# Patient Record
Sex: MALE | URBAN - METROPOLITAN AREA
[De-identification: names, ages, dates, MRNs, and addresses within clinical notes are randomized per-mention and may not be internally consistent; named-entity substitution may affect disease eponyms.]

---

## 2019-01-01 ENCOUNTER — APPOINTMENT (OUTPATIENT)
Dept: RADIOLOGY | Facility: MEDICAL CENTER | Age: 64
DRG: 082 | End: 2019-01-01
Attending: NEUROLOGICAL SURGERY
Payer: COMMERCIAL

## 2019-01-01 ENCOUNTER — APPOINTMENT (OUTPATIENT)
Dept: RADIOLOGY | Facility: MEDICAL CENTER | Age: 64
DRG: 082 | End: 2019-01-01
Attending: NURSE PRACTITIONER
Payer: COMMERCIAL

## 2019-01-01 ENCOUNTER — APPOINTMENT (OUTPATIENT)
Dept: RADIOLOGY | Facility: MEDICAL CENTER | Age: 64
DRG: 082 | End: 2019-01-01
Attending: EMERGENCY MEDICINE
Payer: COMMERCIAL

## 2019-01-01 ENCOUNTER — APPOINTMENT (OUTPATIENT)
Dept: RADIOLOGY | Facility: MEDICAL CENTER | Age: 64
DRG: 082 | End: 2019-01-01
Attending: SURGERY
Payer: COMMERCIAL

## 2019-01-01 ENCOUNTER — HOSPITAL ENCOUNTER (INPATIENT)
Facility: MEDICAL CENTER | Age: 64
LOS: 12 days | DRG: 082 | End: 2019-04-22
Attending: EMERGENCY MEDICINE | Admitting: SURGERY
Payer: COMMERCIAL

## 2019-01-01 ENCOUNTER — APPOINTMENT (OUTPATIENT)
Dept: RADIOLOGY | Facility: MEDICAL CENTER | Age: 64
DRG: 082 | End: 2019-01-01
Attending: PEDIATRICS
Payer: COMMERCIAL

## 2019-01-01 ENCOUNTER — APPOINTMENT (OUTPATIENT)
Dept: RADIOLOGY | Facility: MEDICAL CENTER | Age: 64
DRG: 082 | End: 2019-01-01
Attending: PHYSICIAN ASSISTANT
Payer: COMMERCIAL

## 2019-01-01 VITALS
WEIGHT: 164.46 LBS | TEMPERATURE: 98.4 F | BODY MASS INDEX: 23.02 KG/M2 | DIASTOLIC BLOOD PRESSURE: 81 MMHG | SYSTOLIC BLOOD PRESSURE: 122 MMHG | HEIGHT: 71 IN

## 2019-01-01 LAB
ABO GROUP BLD: NORMAL
ABO GROUP BLD: NORMAL
ACTION RANGE TRIGGERED IACRT: NO
ACTION RANGE TRIGGERED IACRT: NO
ALBUMIN SERPL BCP-MCNC: 2.5 G/DL (ref 3.2–4.9)
ALBUMIN SERPL BCP-MCNC: 2.5 G/DL (ref 3.2–4.9)
ALBUMIN SERPL BCP-MCNC: 2.8 G/DL (ref 3.2–4.9)
ALBUMIN SERPL BCP-MCNC: 2.9 G/DL (ref 3.2–4.9)
ALBUMIN SERPL BCP-MCNC: 3 G/DL (ref 3.2–4.9)
ALBUMIN SERPL BCP-MCNC: 3.1 G/DL (ref 3.2–4.9)
ALBUMIN SERPL BCP-MCNC: 3.3 G/DL (ref 3.2–4.9)
ALBUMIN/GLOB SERPL: 0.8 G/DL
ALP SERPL-CCNC: 106 U/L (ref 30–99)
ALP SERPL-CCNC: 113 U/L (ref 30–99)
ALP SERPL-CCNC: 116 U/L (ref 30–99)
ALP SERPL-CCNC: 150 U/L (ref 30–99)
ALP SERPL-CCNC: 161 U/L (ref 30–99)
ALP SERPL-CCNC: 164 U/L (ref 30–99)
ALP SERPL-CCNC: 193 U/L (ref 30–99)
ALT SERPL-CCNC: 6 U/L (ref 2–50)
ALT SERPL-CCNC: 7 U/L (ref 2–50)
ALT SERPL-CCNC: <5 U/L (ref 2–50)
AMMONIA PLAS-SCNC: 37 UMOL/L (ref 11–45)
AMORPH CRY #/AREA URNS HPF: PRESENT /HPF
ANION GAP SERPL CALC-SCNC: 10 MMOL/L (ref 0–11.9)
ANION GAP SERPL CALC-SCNC: 10 MMOL/L (ref 0–11.9)
ANION GAP SERPL CALC-SCNC: 14 MMOL/L (ref 0–11.9)
ANION GAP SERPL CALC-SCNC: 15 MMOL/L (ref 0–11.9)
ANION GAP SERPL CALC-SCNC: 15 MMOL/L (ref 0–11.9)
ANION GAP SERPL CALC-SCNC: 17 MMOL/L (ref 0–11.9)
ANION GAP SERPL CALC-SCNC: 7 MMOL/L (ref 0–11.9)
ANION GAP SERPL CALC-SCNC: 7 MMOL/L (ref 0–11.9)
ANION GAP SERPL CALC-SCNC: 8 MMOL/L (ref 0–11.9)
ANION GAP SERPL CALC-SCNC: 9 MMOL/L (ref 0–11.9)
ANISOCYTOSIS BLD QL SMEAR: ABNORMAL
APPEARANCE UR: ABNORMAL
AST SERPL-CCNC: 10 U/L (ref 12–45)
AST SERPL-CCNC: 11 U/L (ref 12–45)
AST SERPL-CCNC: 12 U/L (ref 12–45)
AST SERPL-CCNC: 15 U/L (ref 12–45)
AST SERPL-CCNC: 15 U/L (ref 12–45)
AST SERPL-CCNC: 16 U/L (ref 12–45)
AST SERPL-CCNC: 22 U/L (ref 12–45)
BACTERIA #/AREA URNS HPF: ABNORMAL /HPF
BACTERIA BLD CULT: NORMAL
BACTERIA SPEC RESP CULT: ABNORMAL
BACTERIA SPEC RESP CULT: ABNORMAL
BASE EXCESS BLDA CALC-SCNC: 1 MMOL/L (ref -4–3)
BASE EXCESS BLDA CALC-SCNC: 5 MMOL/L (ref -4–3)
BASE EXCESS BLDA CALC-SCNC: 7 MMOL/L (ref -4–3)
BASOPHILS # BLD AUTO: 0.1 % (ref 0–1.8)
BASOPHILS # BLD AUTO: 0.1 % (ref 0–1.8)
BASOPHILS # BLD AUTO: 0.2 % (ref 0–1.8)
BASOPHILS # BLD AUTO: 0.3 % (ref 0–1.8)
BASOPHILS # BLD AUTO: 0.3 % (ref 0–1.8)
BASOPHILS # BLD AUTO: 0.5 % (ref 0–1.8)
BASOPHILS # BLD AUTO: 0.6 % (ref 0–1.8)
BASOPHILS # BLD AUTO: 0.7 % (ref 0–1.8)
BASOPHILS # BLD AUTO: 0.7 % (ref 0–1.8)
BASOPHILS # BLD: 0.01 K/UL (ref 0–0.12)
BASOPHILS # BLD: 0.02 K/UL (ref 0–0.12)
BASOPHILS # BLD: 0.03 K/UL (ref 0–0.12)
BASOPHILS # BLD: 0.04 K/UL (ref 0–0.12)
BASOPHILS # BLD: 0.07 K/UL (ref 0–0.12)
BASOPHILS # BLD: 0.07 K/UL (ref 0–0.12)
BASOPHILS # BLD: 0.08 K/UL (ref 0–0.12)
BASOPHILS # BLD: 0.09 K/UL (ref 0–0.12)
BASOPHILS # BLD: 0.09 K/UL (ref 0–0.12)
BASOPHILS # BLD: 0.1 K/UL (ref 0–0.12)
BILIRUB SERPL-MCNC: 1.4 MG/DL (ref 0.1–1.5)
BILIRUB SERPL-MCNC: 1.6 MG/DL (ref 0.1–1.5)
BILIRUB SERPL-MCNC: 1.6 MG/DL (ref 0.1–1.5)
BILIRUB SERPL-MCNC: 1.7 MG/DL (ref 0.1–1.5)
BILIRUB SERPL-MCNC: 1.8 MG/DL (ref 0.1–1.5)
BILIRUB SERPL-MCNC: 1.8 MG/DL (ref 0.1–1.5)
BILIRUB SERPL-MCNC: 1.9 MG/DL (ref 0.1–1.5)
BILIRUB UR QL STRIP.AUTO: ABNORMAL
BLD GP AB SCN SERPL QL: NORMAL
BNP SERPL-MCNC: >5000 PG/ML (ref 0–100)
BODY TEMPERATURE: ABNORMAL CENTIGRADE
BODY TEMPERATURE: ABNORMAL DEGREES
BODY TEMPERATURE: ABNORMAL DEGREES
BUN SERPL-MCNC: 100 MG/DL (ref 8–22)
BUN SERPL-MCNC: 112 MG/DL (ref 8–22)
BUN SERPL-MCNC: 17 MG/DL (ref 8–22)
BUN SERPL-MCNC: 18 MG/DL (ref 8–22)
BUN SERPL-MCNC: 20 MG/DL (ref 8–22)
BUN SERPL-MCNC: 26 MG/DL (ref 8–22)
BUN SERPL-MCNC: 30 MG/DL (ref 8–22)
BUN SERPL-MCNC: 49 MG/DL (ref 8–22)
BUN SERPL-MCNC: 53 MG/DL (ref 8–22)
BUN SERPL-MCNC: 71 MG/DL (ref 8–22)
BUN SERPL-MCNC: 75 MG/DL (ref 8–22)
BUN SERPL-MCNC: 78 MG/DL (ref 8–22)
BUN SERPL-MCNC: 85 MG/DL (ref 8–22)
BUN SERPL-MCNC: 94 MG/DL (ref 8–22)
BURR CELLS BLD QL SMEAR: NORMAL
CALCIUM SERPL-MCNC: 8 MG/DL (ref 8.5–10.5)
CALCIUM SERPL-MCNC: 8.4 MG/DL (ref 8.5–10.5)
CALCIUM SERPL-MCNC: 8.5 MG/DL (ref 8.5–10.5)
CALCIUM SERPL-MCNC: 8.5 MG/DL (ref 8.5–10.5)
CALCIUM SERPL-MCNC: 8.6 MG/DL (ref 8.5–10.5)
CALCIUM SERPL-MCNC: 8.7 MG/DL (ref 8.5–10.5)
CALCIUM SERPL-MCNC: 8.8 MG/DL (ref 8.5–10.5)
CALCIUM SERPL-MCNC: 8.8 MG/DL (ref 8.5–10.5)
CALCIUM SERPL-MCNC: 9 MG/DL (ref 8.5–10.5)
CALCIUM SERPL-MCNC: 9.1 MG/DL (ref 8.5–10.5)
CALCIUM SERPL-MCNC: 9.3 MG/DL (ref 8.5–10.5)
CFT BLD TEG: 3.9 MIN (ref 5–10)
CHLORIDE SERPL-SCNC: 103 MMOL/L (ref 96–112)
CHLORIDE SERPL-SCNC: 103 MMOL/L (ref 96–112)
CHLORIDE SERPL-SCNC: 104 MMOL/L (ref 96–112)
CHLORIDE SERPL-SCNC: 105 MMOL/L (ref 96–112)
CHLORIDE SERPL-SCNC: 106 MMOL/L (ref 96–112)
CHLORIDE SERPL-SCNC: 107 MMOL/L (ref 96–112)
CHLORIDE SERPL-SCNC: 107 MMOL/L (ref 96–112)
CHLORIDE SERPL-SCNC: 112 MMOL/L (ref 96–112)
CHLORIDE SERPL-SCNC: 93 MMOL/L (ref 96–112)
CHLORIDE SERPL-SCNC: 94 MMOL/L (ref 96–112)
CHLORIDE SERPL-SCNC: 98 MMOL/L (ref 96–112)
CHLORIDE SERPL-SCNC: 98 MMOL/L (ref 96–112)
CLOT ANGLE BLD TEG: 68.9 DEGREES (ref 53–72)
CLOT LYSIS 30M P MA LENFR BLD TEG: 0.3 % (ref 0–8)
CO2 BLDA-SCNC: 29 MMOL/L (ref 20–33)
CO2 BLDA-SCNC: 30 MMOL/L (ref 20–33)
CO2 SERPL-SCNC: 22 MMOL/L (ref 20–33)
CO2 SERPL-SCNC: 24 MMOL/L (ref 20–33)
CO2 SERPL-SCNC: 25 MMOL/L (ref 20–33)
CO2 SERPL-SCNC: 27 MMOL/L (ref 20–33)
CO2 SERPL-SCNC: 28 MMOL/L (ref 20–33)
CO2 SERPL-SCNC: 29 MMOL/L (ref 20–33)
CO2 SERPL-SCNC: 29 MMOL/L (ref 20–33)
CO2 SERPL-SCNC: 31 MMOL/L (ref 20–33)
COLOR UR: YELLOW
COMMENT 1642: NORMAL
CREAT SERPL-MCNC: 2.45 MG/DL (ref 0.5–1.4)
CREAT SERPL-MCNC: 2.51 MG/DL (ref 0.5–1.4)
CREAT SERPL-MCNC: 2.52 MG/DL (ref 0.5–1.4)
CREAT SERPL-MCNC: 2.52 MG/DL (ref 0.5–1.4)
CREAT SERPL-MCNC: 2.76 MG/DL (ref 0.5–1.4)
CREAT SERPL-MCNC: 2.83 MG/DL (ref 0.5–1.4)
CREAT SERPL-MCNC: 2.85 MG/DL (ref 0.5–1.4)
CREAT SERPL-MCNC: 2.92 MG/DL (ref 0.5–1.4)
CREAT SERPL-MCNC: 2.95 MG/DL (ref 0.5–1.4)
CREAT SERPL-MCNC: 2.96 MG/DL (ref 0.5–1.4)
CREAT SERPL-MCNC: 3.01 MG/DL (ref 0.5–1.4)
CREAT SERPL-MCNC: 3.15 MG/DL (ref 0.5–1.4)
CREAT SERPL-MCNC: 3.17 MG/DL (ref 0.5–1.4)
CREAT SERPL-MCNC: 3.59 MG/DL (ref 0.5–1.4)
CRP SERPL HS-MCNC: 14.17 MG/DL (ref 0–0.75)
CT.EXTRINSIC BLD ROTEM: 1.5 MIN (ref 1–3)
EKG IMPRESSION: NORMAL
EOSINOPHIL # BLD AUTO: 0 K/UL (ref 0–0.51)
EOSINOPHIL # BLD AUTO: 0 K/UL (ref 0–0.51)
EOSINOPHIL # BLD AUTO: 0.02 K/UL (ref 0–0.51)
EOSINOPHIL # BLD AUTO: 0.07 K/UL (ref 0–0.51)
EOSINOPHIL # BLD AUTO: 0.07 K/UL (ref 0–0.51)
EOSINOPHIL # BLD AUTO: 0.15 K/UL (ref 0–0.51)
EOSINOPHIL # BLD AUTO: 0.17 K/UL (ref 0–0.51)
EOSINOPHIL # BLD AUTO: 0.21 K/UL (ref 0–0.51)
EOSINOPHIL # BLD AUTO: 0.26 K/UL (ref 0–0.51)
EOSINOPHIL # BLD AUTO: 0.27 K/UL (ref 0–0.51)
EOSINOPHIL NFR BLD: 0 % (ref 0–6.9)
EOSINOPHIL NFR BLD: 0 % (ref 0–6.9)
EOSINOPHIL NFR BLD: 0.2 % (ref 0–6.9)
EOSINOPHIL NFR BLD: 0.5 % (ref 0–6.9)
EOSINOPHIL NFR BLD: 0.5 % (ref 0–6.9)
EOSINOPHIL NFR BLD: 1.1 % (ref 0–6.9)
EOSINOPHIL NFR BLD: 1.3 % (ref 0–6.9)
EOSINOPHIL NFR BLD: 1.3 % (ref 0–6.9)
EOSINOPHIL NFR BLD: 1.7 % (ref 0–6.9)
EOSINOPHIL NFR BLD: 1.8 % (ref 0–6.9)
EOSINOPHIL NFR BLD: 2 % (ref 0–6.9)
EPI CELLS #/AREA URNS HPF: ABNORMAL /HPF
ERYTHROCYTE [DISTWIDTH] IN BLOOD BY AUTOMATED COUNT: 56.5 FL (ref 35.9–50)
ERYTHROCYTE [DISTWIDTH] IN BLOOD BY AUTOMATED COUNT: 57.5 FL (ref 35.9–50)
ERYTHROCYTE [DISTWIDTH] IN BLOOD BY AUTOMATED COUNT: 58.1 FL (ref 35.9–50)
ERYTHROCYTE [DISTWIDTH] IN BLOOD BY AUTOMATED COUNT: 58.9 FL (ref 35.9–50)
ERYTHROCYTE [DISTWIDTH] IN BLOOD BY AUTOMATED COUNT: 59.7 FL (ref 35.9–50)
ERYTHROCYTE [DISTWIDTH] IN BLOOD BY AUTOMATED COUNT: 59.7 FL (ref 35.9–50)
ERYTHROCYTE [DISTWIDTH] IN BLOOD BY AUTOMATED COUNT: 60.1 FL (ref 35.9–50)
ERYTHROCYTE [DISTWIDTH] IN BLOOD BY AUTOMATED COUNT: 61.8 FL (ref 35.9–50)
ERYTHROCYTE [DISTWIDTH] IN BLOOD BY AUTOMATED COUNT: 62.1 FL (ref 35.9–50)
ERYTHROCYTE [DISTWIDTH] IN BLOOD BY AUTOMATED COUNT: 62.5 FL (ref 35.9–50)
ERYTHROCYTE [DISTWIDTH] IN BLOOD BY AUTOMATED COUNT: 62.5 FL (ref 35.9–50)
ERYTHROCYTE [DISTWIDTH] IN BLOOD BY AUTOMATED COUNT: 62.7 FL (ref 35.9–50)
ERYTHROCYTE [DISTWIDTH] IN BLOOD BY AUTOMATED COUNT: 63 FL (ref 35.9–50)
ERYTHROCYTE [DISTWIDTH] IN BLOOD BY AUTOMATED COUNT: 63.7 FL (ref 35.9–50)
ETHANOL BLD-MCNC: 0 G/DL
GLOBULIN SER CALC-MCNC: 3.1 G/DL (ref 1.9–3.5)
GLOBULIN SER CALC-MCNC: 3.2 G/DL (ref 1.9–3.5)
GLOBULIN SER CALC-MCNC: 3.5 G/DL (ref 1.9–3.5)
GLOBULIN SER CALC-MCNC: 3.5 G/DL (ref 1.9–3.5)
GLOBULIN SER CALC-MCNC: 3.7 G/DL (ref 1.9–3.5)
GLOBULIN SER CALC-MCNC: 3.8 G/DL (ref 1.9–3.5)
GLOBULIN SER CALC-MCNC: 4.1 G/DL (ref 1.9–3.5)
GLUCOSE BLD-MCNC: 129 MG/DL (ref 65–99)
GLUCOSE BLD-MCNC: 136 MG/DL (ref 65–99)
GLUCOSE BLD-MCNC: 140 MG/DL (ref 65–99)
GLUCOSE BLD-MCNC: 141 MG/DL (ref 65–99)
GLUCOSE BLD-MCNC: 147 MG/DL (ref 65–99)
GLUCOSE BLD-MCNC: 147 MG/DL (ref 65–99)
GLUCOSE BLD-MCNC: 148 MG/DL (ref 65–99)
GLUCOSE BLD-MCNC: 149 MG/DL (ref 65–99)
GLUCOSE BLD-MCNC: 152 MG/DL (ref 65–99)
GLUCOSE BLD-MCNC: 155 MG/DL (ref 65–99)
GLUCOSE BLD-MCNC: 160 MG/DL (ref 65–99)
GLUCOSE BLD-MCNC: 162 MG/DL (ref 65–99)
GLUCOSE BLD-MCNC: 164 MG/DL (ref 65–99)
GLUCOSE BLD-MCNC: 165 MG/DL (ref 65–99)
GLUCOSE BLD-MCNC: 166 MG/DL (ref 65–99)
GLUCOSE BLD-MCNC: 168 MG/DL (ref 65–99)
GLUCOSE BLD-MCNC: 171 MG/DL (ref 65–99)
GLUCOSE BLD-MCNC: 172 MG/DL (ref 65–99)
GLUCOSE BLD-MCNC: 176 MG/DL (ref 65–99)
GLUCOSE BLD-MCNC: 177 MG/DL (ref 65–99)
GLUCOSE BLD-MCNC: 179 MG/DL (ref 65–99)
GLUCOSE BLD-MCNC: 179 MG/DL (ref 65–99)
GLUCOSE BLD-MCNC: 182 MG/DL (ref 65–99)
GLUCOSE BLD-MCNC: 183 MG/DL (ref 65–99)
GLUCOSE BLD-MCNC: 185 MG/DL (ref 65–99)
GLUCOSE BLD-MCNC: 188 MG/DL (ref 65–99)
GLUCOSE BLD-MCNC: 189 MG/DL (ref 65–99)
GLUCOSE BLD-MCNC: 190 MG/DL (ref 65–99)
GLUCOSE BLD-MCNC: 192 MG/DL (ref 65–99)
GLUCOSE BLD-MCNC: 194 MG/DL (ref 65–99)
GLUCOSE BLD-MCNC: 196 MG/DL (ref 65–99)
GLUCOSE BLD-MCNC: 196 MG/DL (ref 65–99)
GLUCOSE BLD-MCNC: 197 MG/DL (ref 65–99)
GLUCOSE BLD-MCNC: 200 MG/DL (ref 65–99)
GLUCOSE BLD-MCNC: 201 MG/DL (ref 65–99)
GLUCOSE BLD-MCNC: 204 MG/DL (ref 65–99)
GLUCOSE BLD-MCNC: 206 MG/DL (ref 65–99)
GLUCOSE BLD-MCNC: 207 MG/DL (ref 65–99)
GLUCOSE BLD-MCNC: 209 MG/DL (ref 65–99)
GLUCOSE BLD-MCNC: 210 MG/DL (ref 65–99)
GLUCOSE BLD-MCNC: 214 MG/DL (ref 65–99)
GLUCOSE BLD-MCNC: 225 MG/DL (ref 65–99)
GLUCOSE BLD-MCNC: 247 MG/DL (ref 65–99)
GLUCOSE BLD-MCNC: 257 MG/DL (ref 65–99)
GLUCOSE BLD-MCNC: 275 MG/DL (ref 65–99)
GLUCOSE BLD-MCNC: 279 MG/DL (ref 65–99)
GLUCOSE SERPL-MCNC: 151 MG/DL (ref 65–99)
GLUCOSE SERPL-MCNC: 154 MG/DL (ref 65–99)
GLUCOSE SERPL-MCNC: 169 MG/DL (ref 65–99)
GLUCOSE SERPL-MCNC: 178 MG/DL (ref 65–99)
GLUCOSE SERPL-MCNC: 180 MG/DL (ref 65–99)
GLUCOSE SERPL-MCNC: 185 MG/DL (ref 65–99)
GLUCOSE SERPL-MCNC: 189 MG/DL (ref 65–99)
GLUCOSE SERPL-MCNC: 194 MG/DL (ref 65–99)
GLUCOSE SERPL-MCNC: 197 MG/DL (ref 65–99)
GLUCOSE SERPL-MCNC: 198 MG/DL (ref 65–99)
GLUCOSE SERPL-MCNC: 211 MG/DL (ref 65–99)
GLUCOSE SERPL-MCNC: 220 MG/DL (ref 65–99)
GLUCOSE SERPL-MCNC: 250 MG/DL (ref 65–99)
GLUCOSE SERPL-MCNC: 271 MG/DL (ref 65–99)
GLUCOSE UR STRIP.AUTO-MCNC: NEGATIVE MG/DL
GRAM STN SPEC: ABNORMAL
GRAM STN SPEC: NORMAL
HAV IGM SERPL QL IA: NEGATIVE
HBV CORE IGM SER QL: NEGATIVE
HBV SURFACE AB SERPL IA-ACNC: 60.15 MIU/ML (ref 0–10)
HBV SURFACE AG SER QL: NEGATIVE
HCO3 BLDA-SCNC: 24 MMOL/L (ref 17–25)
HCO3 BLDA-SCNC: 28 MMOL/L (ref 17–25)
HCO3 BLDA-SCNC: 28.5 MMOL/L (ref 17–25)
HCT VFR BLD AUTO: 26.9 % (ref 42–52)
HCT VFR BLD AUTO: 29.1 % (ref 42–52)
HCT VFR BLD AUTO: 29.4 % (ref 42–52)
HCT VFR BLD AUTO: 30.3 % (ref 42–52)
HCT VFR BLD AUTO: 30.5 % (ref 42–52)
HCT VFR BLD AUTO: 31.4 % (ref 42–52)
HCT VFR BLD AUTO: 31.5 % (ref 42–52)
HCT VFR BLD AUTO: 32.9 % (ref 42–52)
HCT VFR BLD AUTO: 33.4 % (ref 42–52)
HCT VFR BLD AUTO: 33.9 % (ref 42–52)
HCT VFR BLD AUTO: 34.1 % (ref 42–52)
HCT VFR BLD AUTO: 37.6 % (ref 42–52)
HCT VFR BLD AUTO: 39.2 % (ref 42–52)
HCT VFR BLD AUTO: 41 % (ref 42–52)
HCV AB SER QL: NEGATIVE
HGB BLD-MCNC: 10.2 G/DL (ref 14–18)
HGB BLD-MCNC: 10.2 G/DL (ref 14–18)
HGB BLD-MCNC: 10.7 G/DL (ref 14–18)
HGB BLD-MCNC: 10.8 G/DL (ref 14–18)
HGB BLD-MCNC: 11.4 G/DL (ref 14–18)
HGB BLD-MCNC: 12.2 G/DL (ref 14–18)
HGB BLD-MCNC: 12.6 G/DL (ref 14–18)
HGB BLD-MCNC: 13 G/DL (ref 14–18)
HGB BLD-MCNC: 8.8 G/DL (ref 14–18)
HGB BLD-MCNC: 9.3 G/DL (ref 14–18)
HGB BLD-MCNC: 9.3 G/DL (ref 14–18)
HGB BLD-MCNC: 9.5 G/DL (ref 14–18)
HGB BLD-MCNC: 9.6 G/DL (ref 14–18)
HGB BLD-MCNC: 9.7 G/DL (ref 14–18)
HOROWITZ INDEX BLDA+IHG-RTO: 340 MM[HG]
HOROWITZ INDEX BLDA+IHG-RTO: 397 MM[HG]
IMM GRANULOCYTES # BLD AUTO: 0.05 K/UL (ref 0–0.11)
IMM GRANULOCYTES # BLD AUTO: 0.06 K/UL (ref 0–0.11)
IMM GRANULOCYTES # BLD AUTO: 0.07 K/UL (ref 0–0.11)
IMM GRANULOCYTES # BLD AUTO: 0.09 K/UL (ref 0–0.11)
IMM GRANULOCYTES # BLD AUTO: 0.1 K/UL (ref 0–0.11)
IMM GRANULOCYTES # BLD AUTO: 0.1 K/UL (ref 0–0.11)
IMM GRANULOCYTES # BLD AUTO: 0.12 K/UL (ref 0–0.11)
IMM GRANULOCYTES # BLD AUTO: 0.13 K/UL (ref 0–0.11)
IMM GRANULOCYTES # BLD AUTO: 0.14 K/UL (ref 0–0.11)
IMM GRANULOCYTES # BLD AUTO: 0.15 K/UL (ref 0–0.11)
IMM GRANULOCYTES # BLD AUTO: 0.15 K/UL (ref 0–0.11)
IMM GRANULOCYTES NFR BLD AUTO: 0.4 % (ref 0–0.9)
IMM GRANULOCYTES NFR BLD AUTO: 0.5 % (ref 0–0.9)
IMM GRANULOCYTES NFR BLD AUTO: 0.7 % (ref 0–0.9)
IMM GRANULOCYTES NFR BLD AUTO: 0.7 % (ref 0–0.9)
IMM GRANULOCYTES NFR BLD AUTO: 0.8 % (ref 0–0.9)
IMM GRANULOCYTES NFR BLD AUTO: 1 % (ref 0–0.9)
IMM GRANULOCYTES NFR BLD AUTO: 1.1 % (ref 0–0.9)
IMM GRANULOCYTES NFR BLD AUTO: 1.1 % (ref 0–0.9)
INST. QUALIFIED PATIENT IIQPT: YES
INST. QUALIFIED PATIENT IIQPT: YES
KETONES UR STRIP.AUTO-MCNC: ABNORMAL MG/DL
LACTATE BLD-SCNC: 2 MMOL/L (ref 0.5–2)
LEUKOCYTE ESTERASE UR QL STRIP.AUTO: ABNORMAL
LIPASE SERPL-CCNC: <3 U/L (ref 11–82)
LYMPHOCYTES # BLD AUTO: 1.24 K/UL (ref 1–4.8)
LYMPHOCYTES # BLD AUTO: 1.26 K/UL (ref 1–4.8)
LYMPHOCYTES # BLD AUTO: 1.43 K/UL (ref 1–4.8)
LYMPHOCYTES # BLD AUTO: 1.47 K/UL (ref 1–4.8)
LYMPHOCYTES # BLD AUTO: 1.51 K/UL (ref 1–4.8)
LYMPHOCYTES # BLD AUTO: 1.56 K/UL (ref 1–4.8)
LYMPHOCYTES # BLD AUTO: 1.62 K/UL (ref 1–4.8)
LYMPHOCYTES # BLD AUTO: 1.63 K/UL (ref 1–4.8)
LYMPHOCYTES # BLD AUTO: 1.75 K/UL (ref 1–4.8)
LYMPHOCYTES # BLD AUTO: 1.79 K/UL (ref 1–4.8)
LYMPHOCYTES # BLD AUTO: 1.86 K/UL (ref 1–4.8)
LYMPHOCYTES # BLD AUTO: 1.93 K/UL (ref 1–4.8)
LYMPHOCYTES # BLD AUTO: 2.01 K/UL (ref 1–4.8)
LYMPHOCYTES NFR BLD: 10 % (ref 22–41)
LYMPHOCYTES NFR BLD: 10.2 % (ref 22–41)
LYMPHOCYTES NFR BLD: 10.5 % (ref 22–41)
LYMPHOCYTES NFR BLD: 11.4 % (ref 22–41)
LYMPHOCYTES NFR BLD: 12.3 % (ref 22–41)
LYMPHOCYTES NFR BLD: 12.4 % (ref 22–41)
LYMPHOCYTES NFR BLD: 14 % (ref 22–41)
LYMPHOCYTES NFR BLD: 14.2 % (ref 22–41)
LYMPHOCYTES NFR BLD: 14.5 % (ref 22–41)
LYMPHOCYTES NFR BLD: 17.1 % (ref 22–41)
LYMPHOCYTES NFR BLD: 8.8 % (ref 22–41)
LYMPHOCYTES NFR BLD: 9.1 % (ref 22–41)
LYMPHOCYTES NFR BLD: 9.4 % (ref 22–41)
MACROCYTES BLD QL SMEAR: ABNORMAL
MAGNESIUM SERPL-MCNC: 1.8 MG/DL (ref 1.5–2.5)
MAGNESIUM SERPL-MCNC: 1.8 MG/DL (ref 1.5–2.5)
MAGNESIUM SERPL-MCNC: 2 MG/DL (ref 1.5–2.5)
MAGNESIUM SERPL-MCNC: 2.2 MG/DL (ref 1.5–2.5)
MCF BLD TEG: 65.6 MM (ref 50–70)
MCH RBC QN AUTO: 30.4 PG (ref 27–33)
MCH RBC QN AUTO: 30.7 PG (ref 27–33)
MCH RBC QN AUTO: 30.9 PG (ref 27–33)
MCH RBC QN AUTO: 31 PG (ref 27–33)
MCH RBC QN AUTO: 31.1 PG (ref 27–33)
MCH RBC QN AUTO: 31.3 PG (ref 27–33)
MCH RBC QN AUTO: 31.3 PG (ref 27–33)
MCH RBC QN AUTO: 31.4 PG (ref 27–33)
MCH RBC QN AUTO: 31.6 PG (ref 27–33)
MCH RBC QN AUTO: 31.7 PG (ref 27–33)
MCHC RBC AUTO-ENTMCNC: 30.6 G/DL (ref 33.7–35.3)
MCHC RBC AUTO-ENTMCNC: 30.8 G/DL (ref 33.7–35.3)
MCHC RBC AUTO-ENTMCNC: 31 G/DL (ref 33.7–35.3)
MCHC RBC AUTO-ENTMCNC: 31.4 G/DL (ref 33.7–35.3)
MCHC RBC AUTO-ENTMCNC: 31.6 G/DL (ref 33.7–35.3)
MCHC RBC AUTO-ENTMCNC: 31.6 G/DL (ref 33.7–35.3)
MCHC RBC AUTO-ENTMCNC: 31.7 G/DL (ref 33.7–35.3)
MCHC RBC AUTO-ENTMCNC: 32 G/DL (ref 33.7–35.3)
MCHC RBC AUTO-ENTMCNC: 32.1 G/DL (ref 33.7–35.3)
MCHC RBC AUTO-ENTMCNC: 32.3 G/DL (ref 33.7–35.3)
MCHC RBC AUTO-ENTMCNC: 32.4 G/DL (ref 33.7–35.3)
MCHC RBC AUTO-ENTMCNC: 32.7 G/DL (ref 33.7–35.3)
MCHC RBC AUTO-ENTMCNC: 33.4 G/DL (ref 33.7–35.3)
MCHC RBC AUTO-ENTMCNC: 33.4 G/DL (ref 33.7–35.3)
MCV RBC AUTO: 100 FL (ref 81.4–97.8)
MCV RBC AUTO: 93.7 FL (ref 81.4–97.8)
MCV RBC AUTO: 94.4 FL (ref 81.4–97.8)
MCV RBC AUTO: 95.9 FL (ref 81.4–97.8)
MCV RBC AUTO: 96.3 FL (ref 81.4–97.8)
MCV RBC AUTO: 96.6 FL (ref 81.4–97.8)
MCV RBC AUTO: 96.8 FL (ref 81.4–97.8)
MCV RBC AUTO: 97.6 FL (ref 81.4–97.8)
MCV RBC AUTO: 98.3 FL (ref 81.4–97.8)
MCV RBC AUTO: 98.3 FL (ref 81.4–97.8)
MCV RBC AUTO: 98.7 FL (ref 81.4–97.8)
MCV RBC AUTO: 99.1 FL (ref 81.4–97.8)
MCV RBC AUTO: 99.4 FL (ref 81.4–97.8)
MCV RBC AUTO: 99.7 FL (ref 81.4–97.8)
MICRO URNS: ABNORMAL
MONOCYTES # BLD AUTO: 0.5 K/UL (ref 0–0.85)
MONOCYTES # BLD AUTO: 0.57 K/UL (ref 0–0.85)
MONOCYTES # BLD AUTO: 0.61 K/UL (ref 0–0.85)
MONOCYTES # BLD AUTO: 0.67 K/UL (ref 0–0.85)
MONOCYTES # BLD AUTO: 0.67 K/UL (ref 0–0.85)
MONOCYTES # BLD AUTO: 0.68 K/UL (ref 0–0.85)
MONOCYTES # BLD AUTO: 0.72 K/UL (ref 0–0.85)
MONOCYTES # BLD AUTO: 0.76 K/UL (ref 0–0.85)
MONOCYTES # BLD AUTO: 0.76 K/UL (ref 0–0.85)
MONOCYTES # BLD AUTO: 0.8 K/UL (ref 0–0.85)
MONOCYTES # BLD AUTO: 0.82 K/UL (ref 0–0.85)
MONOCYTES # BLD AUTO: 0.87 K/UL (ref 0–0.85)
MONOCYTES # BLD AUTO: 1.19 K/UL (ref 0–0.85)
MONOCYTES NFR BLD AUTO: 4 % (ref 0–13.4)
MONOCYTES NFR BLD AUTO: 4.1 % (ref 0–13.4)
MONOCYTES NFR BLD AUTO: 4.1 % (ref 0–13.4)
MONOCYTES NFR BLD AUTO: 4.3 % (ref 0–13.4)
MONOCYTES NFR BLD AUTO: 4.8 % (ref 0–13.4)
MONOCYTES NFR BLD AUTO: 5.1 % (ref 0–13.4)
MONOCYTES NFR BLD AUTO: 5.5 % (ref 0–13.4)
MONOCYTES NFR BLD AUTO: 5.7 % (ref 0–13.4)
MONOCYTES NFR BLD AUTO: 6 % (ref 0–13.4)
MONOCYTES NFR BLD AUTO: 6.2 % (ref 0–13.4)
MONOCYTES NFR BLD AUTO: 6.2 % (ref 0–13.4)
MONOCYTES NFR BLD AUTO: 6.5 % (ref 0–13.4)
MONOCYTES NFR BLD AUTO: 6.7 % (ref 0–13.4)
MORPHOLOGY BLD-IMP: NORMAL
MRSA DNA SPEC QL NAA+PROBE: NORMAL
NEUTROPHILS # BLD AUTO: 10.21 K/UL (ref 1.82–7.42)
NEUTROPHILS # BLD AUTO: 10.46 K/UL (ref 1.82–7.42)
NEUTROPHILS # BLD AUTO: 10.47 K/UL (ref 1.82–7.42)
NEUTROPHILS # BLD AUTO: 10.86 K/UL (ref 1.82–7.42)
NEUTROPHILS # BLD AUTO: 11.96 K/UL (ref 1.82–7.42)
NEUTROPHILS # BLD AUTO: 12.92 K/UL (ref 1.82–7.42)
NEUTROPHILS # BLD AUTO: 13 K/UL (ref 1.82–7.42)
NEUTROPHILS # BLD AUTO: 13.65 K/UL (ref 1.82–7.42)
NEUTROPHILS # BLD AUTO: 15.02 K/UL (ref 1.82–7.42)
NEUTROPHILS # BLD AUTO: 8.85 K/UL (ref 1.82–7.42)
NEUTROPHILS # BLD AUTO: 9.38 K/UL (ref 1.82–7.42)
NEUTROPHILS # BLD AUTO: 9.67 K/UL (ref 1.82–7.42)
NEUTROPHILS # BLD AUTO: 9.89 K/UL (ref 1.82–7.42)
NEUTROPHILS NFR BLD: 75.2 % (ref 44–72)
NEUTROPHILS NFR BLD: 75.9 % (ref 44–72)
NEUTROPHILS NFR BLD: 78.1 % (ref 44–72)
NEUTROPHILS NFR BLD: 78.4 % (ref 44–72)
NEUTROPHILS NFR BLD: 79 % (ref 44–72)
NEUTROPHILS NFR BLD: 79.3 % (ref 44–72)
NEUTROPHILS NFR BLD: 79.3 % (ref 44–72)
NEUTROPHILS NFR BLD: 82.4 % (ref 44–72)
NEUTROPHILS NFR BLD: 82.6 % (ref 44–72)
NEUTROPHILS NFR BLD: 82.7 % (ref 44–72)
NEUTROPHILS NFR BLD: 84.6 % (ref 44–72)
NEUTROPHILS NFR BLD: 84.9 % (ref 44–72)
NEUTROPHILS NFR BLD: 85.3 % (ref 44–72)
NITRITE UR QL STRIP.AUTO: NEGATIVE
NRBC # BLD AUTO: 0 K/UL
NRBC BLD-RTO: 0 /100 WBC
O2/TOTAL GAS SETTING VFR VENT: 30 %
O2/TOTAL GAS SETTING VFR VENT: 30 %
OVALOCYTES BLD QL SMEAR: NORMAL
PA AA BLD-ACNC: 25.4 %
PA ADP BLD-ACNC: 64.9 %
PCO2 BLDA: 26.2 MMHG (ref 26–37)
PCO2 BLDA: 34.4 MMHG (ref 26–37)
PCO2 BLDA: 36.7 MMHG (ref 26–37)
PCO2 TEMP ADJ BLDA: 25.3 MMHG (ref 26–37)
PCO2 TEMP ADJ BLDA: 36.1 MMHG (ref 26–37)
PH BLDA: 7.47 [PH] (ref 7.4–7.5)
PH BLDA: 7.5 [PH] (ref 7.4–7.5)
PH BLDA: 7.64 [PH] (ref 7.4–7.5)
PH TEMP ADJ BLDA: 7.5 [PH] (ref 7.4–7.5)
PH TEMP ADJ BLDA: 7.65 [PH] (ref 7.4–7.5)
PH UR STRIP.AUTO: 7 [PH]
PHOSPHATE SERPL-MCNC: 1 MG/DL (ref 2.5–4.5)
PHOSPHATE SERPL-MCNC: 1.4 MG/DL (ref 2.5–4.5)
PHOSPHATE SERPL-MCNC: 1.6 MG/DL (ref 2.5–4.5)
PHOSPHATE SERPL-MCNC: 1.7 MG/DL (ref 2.5–4.5)
PHOSPHATE SERPL-MCNC: 2 MG/DL (ref 2.5–4.5)
PHOSPHATE SERPL-MCNC: 2.8 MG/DL (ref 2.5–4.5)
PHOSPHATE SERPL-MCNC: 3.4 MG/DL (ref 2.5–4.5)
PHOSPHATE SERPL-MCNC: 3.8 MG/DL (ref 2.5–4.5)
PLATELET # BLD AUTO: 247 K/UL (ref 164–446)
PLATELET # BLD AUTO: 253 K/UL (ref 164–446)
PLATELET # BLD AUTO: 293 K/UL (ref 164–446)
PLATELET # BLD AUTO: 295 K/UL (ref 164–446)
PLATELET # BLD AUTO: 295 K/UL (ref 164–446)
PLATELET # BLD AUTO: 298 K/UL (ref 164–446)
PLATELET # BLD AUTO: 305 K/UL (ref 164–446)
PLATELET # BLD AUTO: 308 K/UL (ref 164–446)
PLATELET # BLD AUTO: 338 K/UL (ref 164–446)
PLATELET # BLD AUTO: 338 K/UL (ref 164–446)
PLATELET # BLD AUTO: 354 K/UL (ref 164–446)
PLATELET # BLD AUTO: 356 K/UL (ref 164–446)
PLATELET # BLD AUTO: 364 K/UL (ref 164–446)
PLATELET # BLD AUTO: 380 K/UL (ref 164–446)
PLATELET BLD QL SMEAR: NORMAL
PMV BLD AUTO: 8.8 FL (ref 9–12.9)
PMV BLD AUTO: 8.9 FL (ref 9–12.9)
PMV BLD AUTO: 8.9 FL (ref 9–12.9)
PMV BLD AUTO: 9 FL (ref 9–12.9)
PMV BLD AUTO: 9.1 FL (ref 9–12.9)
PMV BLD AUTO: 9.3 FL (ref 9–12.9)
PMV BLD AUTO: 9.4 FL (ref 9–12.9)
PMV BLD AUTO: 9.5 FL (ref 9–12.9)
PMV BLD AUTO: 9.7 FL (ref 9–12.9)
PMV BLD AUTO: 9.8 FL (ref 9–12.9)
PO2 BLDA: 102 MMHG (ref 64–87)
PO2 BLDA: 119 MMHG (ref 64–87)
PO2 BLDA: 203.1 MMHG (ref 64–87)
PO2 TEMP ADJ BLDA: 100 MMHG (ref 64–87)
PO2 TEMP ADJ BLDA: 115 MMHG (ref 64–87)
POIKILOCYTOSIS BLD QL SMEAR: NORMAL
POTASSIUM SERPL-SCNC: 3.4 MMOL/L (ref 3.6–5.5)
POTASSIUM SERPL-SCNC: 3.6 MMOL/L (ref 3.6–5.5)
POTASSIUM SERPL-SCNC: 3.6 MMOL/L (ref 3.6–5.5)
POTASSIUM SERPL-SCNC: 3.7 MMOL/L (ref 3.6–5.5)
POTASSIUM SERPL-SCNC: 3.7 MMOL/L (ref 3.6–5.5)
POTASSIUM SERPL-SCNC: 4 MMOL/L (ref 3.6–5.5)
POTASSIUM SERPL-SCNC: 4.1 MMOL/L (ref 3.6–5.5)
POTASSIUM SERPL-SCNC: 4.2 MMOL/L (ref 3.6–5.5)
POTASSIUM SERPL-SCNC: 4.4 MMOL/L (ref 3.6–5.5)
POTASSIUM SERPL-SCNC: 4.6 MMOL/L (ref 3.6–5.5)
POTASSIUM SERPL-SCNC: 4.7 MMOL/L (ref 3.6–5.5)
POTASSIUM SERPL-SCNC: 4.9 MMOL/L (ref 3.6–5.5)
PREALB SERPL-MCNC: 8 MG/DL (ref 18–38)
PROT SERPL-MCNC: 5.6 G/DL (ref 6–8.2)
PROT SERPL-MCNC: 5.7 G/DL (ref 6–8.2)
PROT SERPL-MCNC: 6.3 G/DL (ref 6–8.2)
PROT SERPL-MCNC: 6.4 G/DL (ref 6–8.2)
PROT SERPL-MCNC: 6.8 G/DL (ref 6–8.2)
PROT SERPL-MCNC: 6.8 G/DL (ref 6–8.2)
PROT SERPL-MCNC: 7.4 G/DL (ref 6–8.2)
PROT UR QL STRIP: 100 MG/DL
RBC # BLD AUTO: 2.78 M/UL (ref 4.7–6.1)
RBC # BLD AUTO: 2.96 M/UL (ref 4.7–6.1)
RBC # BLD AUTO: 2.99 M/UL (ref 4.7–6.1)
RBC # BLD AUTO: 3.07 M/UL (ref 4.7–6.1)
RBC # BLD AUTO: 3.16 M/UL (ref 4.7–6.1)
RBC # BLD AUTO: 3.16 M/UL (ref 4.7–6.1)
RBC # BLD AUTO: 3.23 M/UL (ref 4.7–6.1)
RBC # BLD AUTO: 3.29 M/UL (ref 4.7–6.1)
RBC # BLD AUTO: 3.42 M/UL (ref 4.7–6.1)
RBC # BLD AUTO: 3.47 M/UL (ref 4.7–6.1)
RBC # BLD AUTO: 3.64 M/UL (ref 4.7–6.1)
RBC # BLD AUTO: 3.92 M/UL (ref 4.7–6.1)
RBC # BLD AUTO: 4.06 M/UL (ref 4.7–6.1)
RBC # BLD AUTO: 4.2 M/UL (ref 4.7–6.1)
RBC # URNS HPF: ABNORMAL /HPF
RBC BLD AUTO: PRESENT
RBC UR QL AUTO: ABNORMAL
RH BLD: NORMAL
RH BLD: NORMAL
SAO2 % BLDA: 98 % (ref 93–99)
SAO2 % BLDA: 99 % (ref 93–99)
SAO2 % BLDA: 99.1 % (ref 93–99)
SIGNIFICANT IND 70042: ABNORMAL
SIGNIFICANT IND 70042: NORMAL
SITE SITE: ABNORMAL
SITE SITE: NORMAL
SODIUM SERPL-SCNC: 134 MMOL/L (ref 135–145)
SODIUM SERPL-SCNC: 136 MMOL/L (ref 135–145)
SODIUM SERPL-SCNC: 137 MMOL/L (ref 135–145)
SODIUM SERPL-SCNC: 137 MMOL/L (ref 135–145)
SODIUM SERPL-SCNC: 140 MMOL/L (ref 135–145)
SODIUM SERPL-SCNC: 141 MMOL/L (ref 135–145)
SODIUM SERPL-SCNC: 142 MMOL/L (ref 135–145)
SODIUM SERPL-SCNC: 143 MMOL/L (ref 135–145)
SODIUM SERPL-SCNC: 143 MMOL/L (ref 135–145)
SODIUM SERPL-SCNC: 145 MMOL/L (ref 135–145)
SODIUM SERPL-SCNC: 145 MMOL/L (ref 135–145)
SODIUM SERPL-SCNC: 146 MMOL/L (ref 135–145)
SOURCE SOURCE: ABNORMAL
SOURCE SOURCE: NORMAL
SP GR UR STRIP.AUTO: 1.02
SPECIMEN DRAWN FROM PATIENT: ABNORMAL
SPECIMEN DRAWN FROM PATIENT: ABNORMAL
TEG ALGORITHM TGALG: ABNORMAL
TRIGL SERPL-MCNC: 139 MG/DL (ref 0–149)
TRIGL SERPL-MCNC: 172 MG/DL (ref 0–149)
TRIGL SERPL-MCNC: 60 MG/DL (ref 0–149)
TRIGL SERPL-MCNC: 87 MG/DL (ref 0–149)
TROPONIN I SERPL-MCNC: 0.6 NG/ML (ref 0–0.04)
UROBILINOGEN UR STRIP.AUTO-MCNC: 4 MG/DL
VANCOMYCIN SERPL-MCNC: 15.3 UG/ML
VANCOMYCIN SERPL-MCNC: 36.3 UG/ML
VANCOMYCIN SERPL-MCNC: 39.8 UG/ML
WBC # BLD AUTO: 11.8 K/UL (ref 4.8–10.8)
WBC # BLD AUTO: 12 K/UL (ref 4.8–10.8)
WBC # BLD AUTO: 12.3 K/UL (ref 4.8–10.8)
WBC # BLD AUTO: 12.4 K/UL (ref 4.8–10.8)
WBC # BLD AUTO: 13.1 K/UL (ref 4.8–10.8)
WBC # BLD AUTO: 13.2 K/UL (ref 4.8–10.8)
WBC # BLD AUTO: 13.2 K/UL (ref 4.8–10.8)
WBC # BLD AUTO: 13.8 K/UL (ref 4.8–10.8)
WBC # BLD AUTO: 14.1 K/UL (ref 4.8–10.8)
WBC # BLD AUTO: 15.2 K/UL (ref 4.8–10.8)
WBC # BLD AUTO: 15.7 K/UL (ref 4.8–10.8)
WBC # BLD AUTO: 15.8 K/UL (ref 4.8–10.8)
WBC # BLD AUTO: 16.2 K/UL (ref 4.8–10.8)
WBC # BLD AUTO: 18.2 K/UL (ref 4.8–10.8)
WBC #/AREA URNS HPF: ABNORMAL /HPF

## 2019-01-01 PROCEDURE — 82962 GLUCOSE BLOOD TEST: CPT | Mod: 91

## 2019-01-01 PROCEDURE — 94150 VITAL CAPACITY TEST: CPT

## 2019-01-01 PROCEDURE — 71045 X-RAY EXAM CHEST 1 VIEW: CPT

## 2019-01-01 PROCEDURE — A9270 NON-COVERED ITEM OR SERVICE: HCPCS | Performed by: SURGERY

## 2019-01-01 PROCEDURE — 85025 COMPLETE CBC W/AUTO DIFF WBC: CPT

## 2019-01-01 PROCEDURE — 700102 HCHG RX REV CODE 250 W/ 637 OVERRIDE(OP): Performed by: PSYCHIATRY & NEUROLOGY

## 2019-01-01 PROCEDURE — A9270 NON-COVERED ITEM OR SERVICE: HCPCS | Performed by: NURSE PRACTITIONER

## 2019-01-01 PROCEDURE — 700111 HCHG RX REV CODE 636 W/ 250 OVERRIDE (IP): Performed by: SURGERY

## 2019-01-01 PROCEDURE — 4A10X4Z MONITORING OF CENTRAL NERVOUS ELECTRICAL ACTIVITY, EXTERNAL APPROACH: ICD-10-PCS | Performed by: PSYCHIATRY & NEUROLOGY

## 2019-01-01 PROCEDURE — 95951 EEG: CPT | Mod: 26,52 | Performed by: PSYCHIATRY & NEUROLOGY

## 2019-01-01 PROCEDURE — 80053 COMPREHEN METABOLIC PANEL: CPT

## 2019-01-01 PROCEDURE — 94003 VENT MGMT INPAT SUBQ DAY: CPT

## 2019-01-01 PROCEDURE — 700105 HCHG RX REV CODE 258: Performed by: NURSE PRACTITIONER

## 2019-01-01 PROCEDURE — 99233 SBSQ HOSP IP/OBS HIGH 50: CPT | Performed by: SURGERY

## 2019-01-01 PROCEDURE — 83735 ASSAY OF MAGNESIUM: CPT

## 2019-01-01 PROCEDURE — 770022 HCHG ROOM/CARE - ICU (200)

## 2019-01-01 PROCEDURE — 700102 HCHG RX REV CODE 250 W/ 637 OVERRIDE(OP): Performed by: NURSE PRACTITIONER

## 2019-01-01 PROCEDURE — 90935 HEMODIALYSIS ONE EVALUATION: CPT

## 2019-01-01 PROCEDURE — 80048 BASIC METABOLIC PNL TOTAL CA: CPT

## 2019-01-01 PROCEDURE — 5A1D70Z PERFORMANCE OF URINARY FILTRATION, INTERMITTENT, LESS THAN 6 HOURS PER DAY: ICD-10-PCS | Performed by: INTERNAL MEDICINE

## 2019-01-01 PROCEDURE — 700112 HCHG RX REV CODE 229: Performed by: SURGERY

## 2019-01-01 PROCEDURE — 82962 GLUCOSE BLOOD TEST: CPT

## 2019-01-01 PROCEDURE — 700111 HCHG RX REV CODE 636 W/ 250 OVERRIDE (IP)

## 2019-01-01 PROCEDURE — 87186 SC STD MICRODIL/AGAR DIL: CPT

## 2019-01-01 PROCEDURE — A9270 NON-COVERED ITEM OR SERVICE: HCPCS | Performed by: PSYCHIATRY & NEUROLOGY

## 2019-01-01 PROCEDURE — 99291 CRITICAL CARE FIRST HOUR: CPT | Performed by: SURGERY

## 2019-01-01 PROCEDURE — 700102 HCHG RX REV CODE 250 W/ 637 OVERRIDE(OP): Performed by: SURGERY

## 2019-01-01 PROCEDURE — 93886 INTRACRANIAL COMPLETE STUDY: CPT

## 2019-01-01 PROCEDURE — 82140 ASSAY OF AMMONIA: CPT

## 2019-01-01 PROCEDURE — 83605 ASSAY OF LACTIC ACID: CPT

## 2019-01-01 PROCEDURE — 87040 BLOOD CULTURE FOR BACTERIA: CPT | Mod: 91

## 2019-01-01 PROCEDURE — 99292 CRITICAL CARE ADDL 30 MIN: CPT | Performed by: SURGERY

## 2019-01-01 PROCEDURE — 700101 HCHG RX REV CODE 250: Performed by: INTERNAL MEDICINE

## 2019-01-01 PROCEDURE — 82803 BLOOD GASES ANY COMBINATION: CPT

## 2019-01-01 PROCEDURE — 36415 COLL VENOUS BLD VENIPUNCTURE: CPT

## 2019-01-01 PROCEDURE — 700105 HCHG RX REV CODE 258: Performed by: INTERNAL MEDICINE

## 2019-01-01 PROCEDURE — 700105 HCHG RX REV CODE 258: Performed by: PHYSICIAN ASSISTANT

## 2019-01-01 PROCEDURE — 96375 TX/PRO/DX INJ NEW DRUG ADDON: CPT

## 2019-01-01 PROCEDURE — 93970 EXTREMITY STUDY: CPT

## 2019-01-01 PROCEDURE — 95951 EEG: CPT | Mod: 52 | Performed by: PSYCHIATRY & NEUROLOGY

## 2019-01-01 PROCEDURE — 84100 ASSAY OF PHOSPHORUS: CPT

## 2019-01-01 PROCEDURE — 83690 ASSAY OF LIPASE: CPT

## 2019-01-01 PROCEDURE — 80074 ACUTE HEPATITIS PANEL: CPT

## 2019-01-01 PROCEDURE — 700111 HCHG RX REV CODE 636 W/ 250 OVERRIDE (IP): Performed by: NURSE PRACTITIONER

## 2019-01-01 PROCEDURE — 99291 CRITICAL CARE FIRST HOUR: CPT

## 2019-01-01 PROCEDURE — 700105 HCHG RX REV CODE 258: Performed by: SURGERY

## 2019-01-01 PROCEDURE — 81001 URINALYSIS AUTO W/SCOPE: CPT

## 2019-01-01 PROCEDURE — 84134 ASSAY OF PREALBUMIN: CPT

## 2019-01-01 PROCEDURE — 93005 ELECTROCARDIOGRAM TRACING: CPT | Performed by: EMERGENCY MEDICINE

## 2019-01-01 PROCEDURE — 5A1955Z RESPIRATORY VENTILATION, GREATER THAN 96 CONSECUTIVE HOURS: ICD-10-PCS | Performed by: SURGERY

## 2019-01-01 PROCEDURE — 83880 ASSAY OF NATRIURETIC PEPTIDE: CPT

## 2019-01-01 PROCEDURE — 85576 BLOOD PLATELET AGGREGATION: CPT | Mod: 91

## 2019-01-01 PROCEDURE — 85027 COMPLETE CBC AUTOMATED: CPT

## 2019-01-01 PROCEDURE — 31500 INSERT EMERGENCY AIRWAY: CPT

## 2019-01-01 PROCEDURE — 87205 SMEAR GRAM STAIN: CPT

## 2019-01-01 PROCEDURE — 99223 1ST HOSP IP/OBS HIGH 75: CPT | Performed by: PSYCHIATRY & NEUROLOGY

## 2019-01-01 PROCEDURE — 700105 HCHG RX REV CODE 258: Performed by: EMERGENCY MEDICINE

## 2019-01-01 PROCEDURE — 86706 HEP B SURFACE ANTIBODY: CPT

## 2019-01-01 PROCEDURE — 80307 DRUG TEST PRSMV CHEM ANLYZR: CPT

## 2019-01-01 PROCEDURE — 304561 HCHG STAT O2

## 2019-01-01 PROCEDURE — 84478 ASSAY OF TRIGLYCERIDES: CPT

## 2019-01-01 PROCEDURE — 95951 EEG: CPT | Mod: 26 | Performed by: PSYCHIATRY & NEUROLOGY

## 2019-01-01 PROCEDURE — 86850 RBC ANTIBODY SCREEN: CPT

## 2019-01-01 PROCEDURE — 700111 HCHG RX REV CODE 636 W/ 250 OVERRIDE (IP): Performed by: PHYSICIAN ASSISTANT

## 2019-01-01 PROCEDURE — 85347 COAGULATION TIME ACTIVATED: CPT

## 2019-01-01 PROCEDURE — 700111 HCHG RX REV CODE 636 W/ 250 OVERRIDE (IP): Performed by: EMERGENCY MEDICINE

## 2019-01-01 PROCEDURE — 86901 BLOOD TYPING SEROLOGIC RH(D): CPT

## 2019-01-01 PROCEDURE — 72125 CT NECK SPINE W/O DYE: CPT

## 2019-01-01 PROCEDURE — 96365 THER/PROPH/DIAG IV INF INIT: CPT

## 2019-01-01 PROCEDURE — 94760 N-INVAS EAR/PLS OXIMETRY 1: CPT

## 2019-01-01 PROCEDURE — 93886 INTRACRANIAL COMPLETE STUDY: CPT | Mod: 26 | Performed by: SURGERY

## 2019-01-01 PROCEDURE — 86140 C-REACTIVE PROTEIN: CPT

## 2019-01-01 PROCEDURE — 700101 HCHG RX REV CODE 250: Performed by: EMERGENCY MEDICINE

## 2019-01-01 PROCEDURE — 87077 CULTURE AEROBIC IDENTIFY: CPT

## 2019-01-01 PROCEDURE — 70450 CT HEAD/BRAIN W/O DYE: CPT

## 2019-01-01 PROCEDURE — 80202 ASSAY OF VANCOMYCIN: CPT

## 2019-01-01 PROCEDURE — 86900 BLOOD TYPING SEROLOGIC ABO: CPT

## 2019-01-01 PROCEDURE — 95951 EEG: CPT | Performed by: PSYCHIATRY & NEUROLOGY

## 2019-01-01 PROCEDURE — 93005 ELECTROCARDIOGRAM TRACING: CPT

## 2019-01-01 PROCEDURE — 36600 WITHDRAWAL OF ARTERIAL BLOOD: CPT

## 2019-01-01 PROCEDURE — 0BH17EZ INSERTION OF ENDOTRACHEAL AIRWAY INTO TRACHEA, VIA NATURAL OR ARTIFICIAL OPENING: ICD-10-PCS | Performed by: EMERGENCY MEDICINE

## 2019-01-01 PROCEDURE — 84484 ASSAY OF TROPONIN QUANT: CPT

## 2019-01-01 PROCEDURE — 94002 VENT MGMT INPAT INIT DAY: CPT

## 2019-01-01 PROCEDURE — 87641 MR-STAPH DNA AMP PROBE: CPT

## 2019-01-01 PROCEDURE — 85384 FIBRINOGEN ACTIVITY: CPT

## 2019-01-01 PROCEDURE — 87070 CULTURE OTHR SPECIMN AEROBIC: CPT

## 2019-01-01 PROCEDURE — 80053 COMPREHEN METABOLIC PANEL: CPT | Mod: 91

## 2019-01-01 RX ORDER — AMOXICILLIN 250 MG
1 CAPSULE ORAL
Status: DISCONTINUED | OUTPATIENT
Start: 2019-01-01 | End: 2019-01-01

## 2019-01-01 RX ORDER — LIDOCAINE 50 MG/G
1 PATCH TOPICAL EVERY 12 HOURS
COMMUNITY

## 2019-01-01 RX ORDER — AMOXICILLIN 250 MG
1 CAPSULE ORAL NIGHTLY
Status: DISCONTINUED | OUTPATIENT
Start: 2019-01-01 | End: 2019-01-01

## 2019-01-01 RX ORDER — FAMOTIDINE 20 MG/1
20 TABLET, FILM COATED ORAL DAILY
Status: DISCONTINUED | OUTPATIENT
Start: 2019-01-01 | End: 2019-01-01 | Stop reason: HOSPADM

## 2019-01-01 RX ORDER — PHENOBARBITAL SODIUM 130 MG/ML
130 INJECTION INTRAMUSCULAR; INTRAVENOUS
Status: DISCONTINUED | OUTPATIENT
Start: 2019-01-01 | End: 2019-01-01

## 2019-01-01 RX ORDER — MORPHINE SULFATE 4 MG/ML
2 INJECTION, SOLUTION INTRAMUSCULAR; INTRAVENOUS
Status: DISCONTINUED | OUTPATIENT
Start: 2019-01-01 | End: 2019-01-01

## 2019-01-01 RX ORDER — LEVETIRACETAM 500 MG/1
1000 TABLET ORAL 2 TIMES DAILY
Status: COMPLETED | OUTPATIENT
Start: 2019-01-01 | End: 2019-01-01

## 2019-01-01 RX ORDER — INSULIN GLARGINE 100 [IU]/ML
10 INJECTION, SOLUTION SUBCUTANEOUS
Status: DISCONTINUED | OUTPATIENT
Start: 2019-01-01 | End: 2019-01-01

## 2019-01-01 RX ORDER — SEVELAMER HYDROCHLORIDE 800 MG/1
1600 TABLET, FILM COATED ORAL
COMMUNITY

## 2019-01-01 RX ORDER — ONDANSETRON 4 MG/1
8 TABLET, ORALLY DISINTEGRATING ORAL EVERY 8 HOURS PRN
Status: DISCONTINUED | OUTPATIENT
Start: 2019-01-01 | End: 2019-01-01 | Stop reason: HOSPADM

## 2019-01-01 RX ORDER — LEVETIRACETAM 100 MG/ML
1000 SOLUTION ORAL EVERY 12 HOURS
Status: DISCONTINUED | OUTPATIENT
Start: 2019-01-01 | End: 2019-01-01

## 2019-01-01 RX ORDER — LEVETIRACETAM 500 MG/1
500 TABLET ORAL 2 TIMES DAILY
Status: DISCONTINUED | OUTPATIENT
Start: 2019-01-01 | End: 2019-01-01

## 2019-01-01 RX ORDER — ACETAMINOPHEN 325 MG/1
650 TABLET ORAL EVERY 4 HOURS PRN
Status: DISCONTINUED | OUTPATIENT
Start: 2019-01-01 | End: 2019-01-01 | Stop reason: HOSPADM

## 2019-01-01 RX ORDER — DEXTROSE MONOHYDRATE 25 G/50ML
25 INJECTION, SOLUTION INTRAVENOUS
Status: DISCONTINUED | OUTPATIENT
Start: 2019-01-01 | End: 2019-01-01

## 2019-01-01 RX ORDER — ROCURONIUM BROMIDE 10 MG/ML
INJECTION, SOLUTION INTRAVENOUS
Status: COMPLETED | OUTPATIENT
Start: 2019-01-01 | End: 2019-01-01

## 2019-01-01 RX ORDER — CEFAZOLIN SODIUM 1 G/50ML
1 INJECTION, SOLUTION INTRAVENOUS EVERY 24 HOURS
Status: DISCONTINUED | OUTPATIENT
Start: 2019-01-01 | End: 2019-01-01

## 2019-01-01 RX ORDER — PHENOBARBITAL SODIUM 130 MG/ML
260 INJECTION INTRAMUSCULAR; INTRAVENOUS
Status: DISCONTINUED | OUTPATIENT
Start: 2019-01-01 | End: 2019-01-01

## 2019-01-01 RX ORDER — DOCUSATE SODIUM 50 MG/5ML
100 LIQUID ORAL 2 TIMES DAILY
Status: DISCONTINUED | OUTPATIENT
Start: 2019-01-01 | End: 2019-01-01

## 2019-01-01 RX ORDER — SODIUM CHLORIDE 9 MG/ML
INJECTION, SOLUTION INTRAVENOUS CONTINUOUS
Status: DISCONTINUED | OUTPATIENT
Start: 2019-01-01 | End: 2019-01-01

## 2019-01-01 RX ORDER — NALOXONE HYDROCHLORIDE 0.4 MG/ML
0.2 INJECTION, SOLUTION INTRAMUSCULAR; INTRAVENOUS; SUBCUTANEOUS ONCE
Status: COMPLETED | OUTPATIENT
Start: 2019-01-01 | End: 2019-01-01

## 2019-01-01 RX ORDER — MAGNESIUM SULFATE HEPTAHYDRATE 40 MG/ML
2 INJECTION, SOLUTION INTRAVENOUS ONCE
Status: DISCONTINUED | OUTPATIENT
Start: 2019-01-01 | End: 2019-01-01

## 2019-01-01 RX ORDER — SIMETHICONE 80 MG
80 TABLET,CHEWABLE ORAL 4 TIMES DAILY
COMMUNITY

## 2019-01-01 RX ORDER — LORAZEPAM 2 MG/ML
1 INJECTION INTRAMUSCULAR
Status: DISCONTINUED | OUTPATIENT
Start: 2019-01-01 | End: 2019-01-01 | Stop reason: HOSPADM

## 2019-01-01 RX ORDER — POTASSIUM CHLORIDE 29.8 MG/ML
40 INJECTION INTRAVENOUS ONCE
Status: DISCONTINUED | OUTPATIENT
Start: 2019-01-01 | End: 2019-01-01

## 2019-01-01 RX ORDER — LORAZEPAM 2 MG/ML
1 CONCENTRATE ORAL
Status: DISCONTINUED | OUTPATIENT
Start: 2019-01-01 | End: 2019-01-01 | Stop reason: HOSPADM

## 2019-01-01 RX ORDER — POLYETHYLENE GLYCOL 3350 17 G/17G
1 POWDER, FOR SOLUTION ORAL 2 TIMES DAILY
Status: DISCONTINUED | OUTPATIENT
Start: 2019-01-01 | End: 2019-01-01

## 2019-01-01 RX ORDER — ONDANSETRON 2 MG/ML
4 INJECTION INTRAMUSCULAR; INTRAVENOUS EVERY 4 HOURS PRN
Status: DISCONTINUED | OUTPATIENT
Start: 2019-01-01 | End: 2019-01-01

## 2019-01-01 RX ORDER — INSULIN GLARGINE 100 [IU]/ML
0.2 INJECTION, SOLUTION SUBCUTANEOUS EVERY EVENING
Status: DISCONTINUED | OUTPATIENT
Start: 2019-01-01 | End: 2019-01-01

## 2019-01-01 RX ORDER — ACETAMINOPHEN 325 MG/1
650 TABLET ORAL EVERY 4 HOURS PRN
Status: DISCONTINUED | OUTPATIENT
Start: 2019-01-01 | End: 2019-01-01

## 2019-01-01 RX ORDER — ENEMA 19; 7 G/133ML; G/133ML
1 ENEMA RECTAL
Status: DISCONTINUED | OUTPATIENT
Start: 2019-01-01 | End: 2019-01-01 | Stop reason: HOSPADM

## 2019-01-01 RX ORDER — HEPARIN SODIUM 5000 [USP'U]/ML
5000 INJECTION, SOLUTION INTRAVENOUS; SUBCUTANEOUS EVERY 8 HOURS
Status: DISCONTINUED | OUTPATIENT
Start: 2019-01-01 | End: 2019-01-01

## 2019-01-01 RX ORDER — ONDANSETRON 2 MG/ML
8 INJECTION INTRAMUSCULAR; INTRAVENOUS EVERY 8 HOURS PRN
Status: DISCONTINUED | OUTPATIENT
Start: 2019-01-01 | End: 2019-01-01 | Stop reason: HOSPADM

## 2019-01-01 RX ORDER — SODIUM CHLORIDE 1 G/1
1 TABLET ORAL 2 TIMES DAILY
Status: DISCONTINUED | OUTPATIENT
Start: 2019-01-01 | End: 2019-01-01

## 2019-01-01 RX ORDER — MORPHINE SULFATE 10 MG/ML
10 INJECTION, SOLUTION INTRAMUSCULAR; INTRAVENOUS
Status: DISCONTINUED | OUTPATIENT
Start: 2019-01-01 | End: 2019-01-01 | Stop reason: HOSPADM

## 2019-01-01 RX ORDER — BISACODYL 10 MG
10 SUPPOSITORY, RECTAL RECTAL
Status: DISCONTINUED | OUTPATIENT
Start: 2019-01-01 | End: 2019-01-01 | Stop reason: HOSPADM

## 2019-01-01 RX ORDER — SODIUM CHLORIDE 1 G/1
2 TABLET ORAL 2 TIMES DAILY
Status: DISCONTINUED | OUTPATIENT
Start: 2019-01-01 | End: 2019-01-01

## 2019-01-01 RX ORDER — NALOXONE HYDROCHLORIDE 0.4 MG/ML
0.2 INJECTION, SOLUTION INTRAMUSCULAR; INTRAVENOUS; SUBCUTANEOUS ONCE
Status: DISCONTINUED | OUTPATIENT
Start: 2019-01-01 | End: 2019-01-01

## 2019-01-01 RX ORDER — DOCUSATE SODIUM 100 MG/1
100 CAPSULE, LIQUID FILLED ORAL 2 TIMES DAILY
Status: DISCONTINUED | OUTPATIENT
Start: 2019-01-01 | End: 2019-01-01

## 2019-01-01 RX ORDER — ATROPINE SULFATE 10 MG/ML
2 SOLUTION/ DROPS OPHTHALMIC EVERY 4 HOURS PRN
Status: DISCONTINUED | OUTPATIENT
Start: 2019-01-01 | End: 2019-01-01 | Stop reason: HOSPADM

## 2019-01-01 RX ADMIN — DOCUSATE SODIUM 100 MG: 50 LIQUID ORAL at 18:16

## 2019-01-01 RX ADMIN — PROPOFOL 40 MCG/KG/MIN: 10 INJECTION, EMULSION INTRAVENOUS at 22:29

## 2019-01-01 RX ADMIN — SODIUM CHLORIDE TAB 1 GM 2 G: 1 TAB at 05:15

## 2019-01-01 RX ADMIN — HEPARIN SODIUM 5000 UNITS: 5000 INJECTION, SOLUTION INTRAVENOUS; SUBCUTANEOUS at 13:03

## 2019-01-01 RX ADMIN — SODIUM CHLORIDE: 9 INJECTION, SOLUTION INTRAVENOUS at 00:57

## 2019-01-01 RX ADMIN — INSULIN GLARGINE 10 UNITS: 100 INJECTION, SOLUTION SUBCUTANEOUS at 05:15

## 2019-01-01 RX ADMIN — SODIUM CHLORIDE TAB 1 GM 2 G: 1 TAB at 17:41

## 2019-01-01 RX ADMIN — CEFEPIME 1 G: 1 INJECTION, POWDER, FOR SOLUTION INTRAMUSCULAR; INTRAVENOUS at 19:26

## 2019-01-01 RX ADMIN — INSULIN HUMAN 4 UNITS: 100 INJECTION, SOLUTION PARENTERAL at 00:07

## 2019-01-01 RX ADMIN — SENNOSIDES, DOCUSATE SODIUM 1 TABLET: 50; 8.6 TABLET, FILM COATED ORAL at 21:57

## 2019-01-01 RX ADMIN — SENNOSIDES, DOCUSATE SODIUM 1 TABLET: 50; 8.6 TABLET, FILM COATED ORAL at 21:19

## 2019-01-01 RX ADMIN — INSULIN HUMAN 3 UNITS: 100 INJECTION, SOLUTION PARENTERAL at 12:34

## 2019-01-01 RX ADMIN — INSULIN HUMAN 3 UNITS: 100 INJECTION, SOLUTION PARENTERAL at 05:20

## 2019-01-01 RX ADMIN — CEFAZOLIN SODIUM 1 G: 1 INJECTION, SOLUTION INTRAVENOUS at 17:40

## 2019-01-01 RX ADMIN — SODIUM CHLORIDE 500 MG: 9 INJECTION, SOLUTION INTRAVENOUS at 17:45

## 2019-01-01 RX ADMIN — LEVETIRACETAM 1000 MG: 100 SOLUTION ORAL at 17:22

## 2019-01-01 RX ADMIN — SODIUM CHLORIDE TAB 1 GM 1 G: 1 TAB at 05:09

## 2019-01-01 RX ADMIN — DOCUSATE SODIUM 100 MG: 50 LIQUID ORAL at 05:29

## 2019-01-01 RX ADMIN — INSULIN HUMAN 7 UNITS: 100 INJECTION, SOLUTION PARENTERAL at 11:20

## 2019-01-01 RX ADMIN — SODIUM CHLORIDE TAB 1 GM 2 G: 1 TAB at 05:58

## 2019-01-01 RX ADMIN — FAMOTIDINE 20 MG: 10 INJECTION INTRAVENOUS at 06:09

## 2019-01-01 RX ADMIN — DOCUSATE SODIUM 100 MG: 50 LIQUID ORAL at 06:06

## 2019-01-01 RX ADMIN — INSULIN HUMAN 3 UNITS: 100 INJECTION, SOLUTION PARENTERAL at 05:58

## 2019-01-01 RX ADMIN — SODIUM CHLORIDE TAB 1 GM 2 G: 1 TAB at 11:52

## 2019-01-01 RX ADMIN — HEPARIN SODIUM 5000 UNITS: 5000 INJECTION, SOLUTION INTRAVENOUS; SUBCUTANEOUS at 21:50

## 2019-01-01 RX ADMIN — DOCUSATE SODIUM 100 MG: 50 LIQUID ORAL at 17:18

## 2019-01-01 RX ADMIN — CEFAZOLIN SODIUM 1 G: 1 INJECTION, SOLUTION INTRAVENOUS at 17:30

## 2019-01-01 RX ADMIN — PROPOFOL 15 MCG/KG/MIN: 10 INJECTION, EMULSION INTRAVENOUS at 17:22

## 2019-01-01 RX ADMIN — CEFEPIME 1 G: 1 INJECTION, POWDER, FOR SOLUTION INTRAMUSCULAR; INTRAVENOUS at 05:21

## 2019-01-01 RX ADMIN — SODIUM CHLORIDE 500 MG: 9 INJECTION, SOLUTION INTRAVENOUS at 05:31

## 2019-01-01 RX ADMIN — LEVETIRACETAM 500 MG: 500 TABLET ORAL at 17:03

## 2019-01-01 RX ADMIN — DOCUSATE SODIUM 100 MG: 50 LIQUID ORAL at 17:41

## 2019-01-01 RX ADMIN — FAMOTIDINE 20 MG: 20 TABLET ORAL at 05:22

## 2019-01-01 RX ADMIN — HEPARIN SODIUM 5000 UNITS: 5000 INJECTION, SOLUTION INTRAVENOUS; SUBCUTANEOUS at 14:49

## 2019-01-01 RX ADMIN — INSULIN HUMAN 4 UNITS: 100 INJECTION, SOLUTION PARENTERAL at 00:23

## 2019-01-01 RX ADMIN — HEPARIN SODIUM 5000 UNITS: 5000 INJECTION, SOLUTION INTRAVENOUS; SUBCUTANEOUS at 05:30

## 2019-01-01 RX ADMIN — HEPARIN SODIUM 5000 UNITS: 5000 INJECTION, SOLUTION INTRAVENOUS; SUBCUTANEOUS at 14:53

## 2019-01-01 RX ADMIN — INSULIN GLARGINE 10 UNITS: 100 INJECTION, SOLUTION SUBCUTANEOUS at 05:30

## 2019-01-01 RX ADMIN — LEVETIRACETAM 1000 MG: 100 SOLUTION ORAL at 17:15

## 2019-01-01 RX ADMIN — HEPARIN SODIUM 5000 UNITS: 5000 INJECTION, SOLUTION INTRAVENOUS; SUBCUTANEOUS at 18:17

## 2019-01-01 RX ADMIN — INSULIN HUMAN 3 UNITS: 100 INJECTION, SOLUTION PARENTERAL at 17:03

## 2019-01-01 RX ADMIN — HEPARIN SODIUM 5000 UNITS: 5000 INJECTION, SOLUTION INTRAVENOUS; SUBCUTANEOUS at 21:06

## 2019-01-01 RX ADMIN — INSULIN LISPRO 1 UNITS: 100 INJECTION, SOLUTION INTRAVENOUS; SUBCUTANEOUS at 11:52

## 2019-01-01 RX ADMIN — VANCOMYCIN HYDROCHLORIDE 1400 MG: 100 INJECTION, POWDER, LYOPHILIZED, FOR SOLUTION INTRAVENOUS at 15:55

## 2019-01-01 RX ADMIN — HEPARIN SODIUM 5000 UNITS: 5000 INJECTION, SOLUTION INTRAVENOUS; SUBCUTANEOUS at 05:47

## 2019-01-01 RX ADMIN — DOCUSATE SODIUM 100 MG: 50 LIQUID ORAL at 05:33

## 2019-01-01 RX ADMIN — SENNOSIDES, DOCUSATE SODIUM 1 TABLET: 50; 8.6 TABLET, FILM COATED ORAL at 21:54

## 2019-01-01 RX ADMIN — INSULIN HUMAN 4 UNITS: 100 INJECTION, SOLUTION PARENTERAL at 23:40

## 2019-01-01 RX ADMIN — CEFAZOLIN SODIUM 1 G: 1 INJECTION, SOLUTION INTRAVENOUS at 18:16

## 2019-01-01 RX ADMIN — INSULIN LISPRO 3 UNITS: 100 INJECTION, SOLUTION INTRAVENOUS; SUBCUTANEOUS at 07:01

## 2019-01-01 RX ADMIN — POLYETHYLENE GLYCOL 3350 1 PACKET: 17 POWDER, FOR SOLUTION ORAL at 17:22

## 2019-01-01 RX ADMIN — PROPOFOL 40 MCG/KG/MIN: 10 INJECTION, EMULSION INTRAVENOUS at 03:10

## 2019-01-01 RX ADMIN — NALOXONE HYDROCHLORIDE 0.2 MG: 0.4 INJECTION, SOLUTION INTRAMUSCULAR; INTRAVENOUS; SUBCUTANEOUS at 13:04

## 2019-01-01 RX ADMIN — SODIUM CHLORIDE 500 MG: 9 INJECTION, SOLUTION INTRAVENOUS at 06:08

## 2019-01-01 RX ADMIN — SODIUM CHLORIDE TAB 1 GM 2 G: 1 TAB at 17:22

## 2019-01-01 RX ADMIN — INSULIN HUMAN 4 UNITS: 100 INJECTION, SOLUTION PARENTERAL at 12:00

## 2019-01-01 RX ADMIN — HEPARIN SODIUM 5000 UNITS: 5000 INJECTION, SOLUTION INTRAVENOUS; SUBCUTANEOUS at 05:21

## 2019-01-01 RX ADMIN — SODIUM CHLORIDE TAB 1 GM 2 G: 1 TAB at 17:03

## 2019-01-01 RX ADMIN — HEPARIN SODIUM 5000 UNITS: 5000 INJECTION, SOLUTION INTRAVENOUS; SUBCUTANEOUS at 05:16

## 2019-01-01 RX ADMIN — INSULIN HUMAN 3 UNITS: 100 INJECTION, SOLUTION PARENTERAL at 05:14

## 2019-01-01 RX ADMIN — HEPARIN SODIUM 5000 UNITS: 5000 INJECTION, SOLUTION INTRAVENOUS; SUBCUTANEOUS at 14:16

## 2019-01-01 RX ADMIN — LEVETIRACETAM 500 MG: 500 TABLET ORAL at 17:07

## 2019-01-01 RX ADMIN — HEPARIN SODIUM 5000 UNITS: 5000 INJECTION, SOLUTION INTRAVENOUS; SUBCUTANEOUS at 21:17

## 2019-01-01 RX ADMIN — SODIUM CHLORIDE TAB 1 GM 2 G: 1 TAB at 05:29

## 2019-01-01 RX ADMIN — LEVETIRACETAM 1000 MG: 500 TABLET ORAL at 05:22

## 2019-01-01 RX ADMIN — INSULIN LISPRO 1 UNITS: 100 INJECTION, SOLUTION INTRAVENOUS; SUBCUTANEOUS at 17:47

## 2019-01-01 RX ADMIN — HEPARIN SODIUM 5000 UNITS: 5000 INJECTION, SOLUTION INTRAVENOUS; SUBCUTANEOUS at 21:56

## 2019-01-01 RX ADMIN — INSULIN HUMAN 4 UNITS: 100 INJECTION, SOLUTION PARENTERAL at 17:45

## 2019-01-01 RX ADMIN — HEPARIN SODIUM 5000 UNITS: 5000 INJECTION, SOLUTION INTRAVENOUS; SUBCUTANEOUS at 21:38

## 2019-01-01 RX ADMIN — INSULIN LISPRO 1 UNITS: 100 INJECTION, SOLUTION INTRAVENOUS; SUBCUTANEOUS at 00:55

## 2019-01-01 RX ADMIN — LEVETIRACETAM 1000 MG: 500 TABLET ORAL at 17:41

## 2019-01-01 RX ADMIN — DOCUSATE SODIUM 100 MG: 50 LIQUID ORAL at 17:22

## 2019-01-01 RX ADMIN — LEVETIRACETAM 500 MG: 500 TABLET ORAL at 05:58

## 2019-01-01 RX ADMIN — HEPARIN SODIUM 5000 UNITS: 5000 INJECTION, SOLUTION INTRAVENOUS; SUBCUTANEOUS at 14:19

## 2019-01-01 RX ADMIN — LEVETIRACETAM 1000 MG: 100 SOLUTION ORAL at 05:09

## 2019-01-01 RX ADMIN — LORAZEPAM 1 MG: 2 INJECTION INTRAMUSCULAR; INTRAVENOUS at 13:55

## 2019-01-01 RX ADMIN — INSULIN GLARGINE 10 UNITS: 100 INJECTION, SOLUTION SUBCUTANEOUS at 05:58

## 2019-01-01 RX ADMIN — LEVETIRACETAM 1000 MG: 100 SOLUTION ORAL at 17:20

## 2019-01-01 RX ADMIN — INSULIN HUMAN 3 UNITS: 100 INJECTION, SOLUTION PARENTERAL at 06:00

## 2019-01-01 RX ADMIN — FAMOTIDINE 20 MG: 20 TABLET ORAL at 05:58

## 2019-01-01 RX ADMIN — LEVETIRACETAM 500 MG: 500 TABLET ORAL at 17:00

## 2019-01-01 RX ADMIN — SODIUM CHLORIDE 500 MG: 9 INJECTION, SOLUTION INTRAVENOUS at 15:17

## 2019-01-01 RX ADMIN — INSULIN LISPRO 1 UNITS: 100 INJECTION, SOLUTION INTRAVENOUS; SUBCUTANEOUS at 05:40

## 2019-01-01 RX ADMIN — SODIUM CHLORIDE 500 MG: 9 INJECTION, SOLUTION INTRAVENOUS at 17:22

## 2019-01-01 RX ADMIN — INSULIN GLARGINE 10 UNITS: 100 INJECTION, SOLUTION SUBCUTANEOUS at 06:01

## 2019-01-01 RX ADMIN — INSULIN HUMAN 3 UNITS: 100 INJECTION, SOLUTION PARENTERAL at 12:55

## 2019-01-01 RX ADMIN — INSULIN GLARGINE 10 UNITS: 100 INJECTION, SOLUTION SUBCUTANEOUS at 05:20

## 2019-01-01 RX ADMIN — PROPOFOL 30 MCG/KG/MIN: 10 INJECTION, EMULSION INTRAVENOUS at 01:10

## 2019-01-01 RX ADMIN — INSULIN GLARGINE 10 UNITS: 100 INJECTION, SOLUTION SUBCUTANEOUS at 05:48

## 2019-01-01 RX ADMIN — SODIUM CHLORIDE TAB 1 GM 2 G: 1 TAB at 17:20

## 2019-01-01 RX ADMIN — CEFEPIME 2 G: 2 INJECTION, POWDER, FOR SOLUTION INTRAVENOUS at 12:16

## 2019-01-01 RX ADMIN — INSULIN HUMAN 3 UNITS: 100 INJECTION, SOLUTION PARENTERAL at 00:34

## 2019-01-01 RX ADMIN — SENNOSIDES, DOCUSATE SODIUM 1 TABLET: 50; 8.6 TABLET, FILM COATED ORAL at 21:17

## 2019-01-01 RX ADMIN — INSULIN GLARGINE 10 UNITS: 100 INJECTION, SOLUTION SUBCUTANEOUS at 05:24

## 2019-01-01 RX ADMIN — INSULIN GLARGINE 10 UNITS: 100 INJECTION, SOLUTION SUBCUTANEOUS at 05:18

## 2019-01-01 RX ADMIN — FAMOTIDINE 20 MG: 20 TABLET ORAL at 06:06

## 2019-01-01 RX ADMIN — HEPARIN SODIUM 5000 UNITS: 5000 INJECTION, SOLUTION INTRAVENOUS; SUBCUTANEOUS at 00:33

## 2019-01-01 RX ADMIN — FAMOTIDINE 20 MG: 20 TABLET ORAL at 05:29

## 2019-01-01 RX ADMIN — DOCUSATE SODIUM 100 MG: 50 LIQUID ORAL at 05:09

## 2019-01-01 RX ADMIN — INSULIN HUMAN 3 UNITS: 100 INJECTION, SOLUTION PARENTERAL at 17:32

## 2019-01-01 RX ADMIN — FAMOTIDINE 20 MG: 20 TABLET ORAL at 05:33

## 2019-01-01 RX ADMIN — INSULIN HUMAN 3 UNITS: 100 INJECTION, SOLUTION PARENTERAL at 05:23

## 2019-01-01 RX ADMIN — SODIUM PHOSPHATE, MONOBASIC, MONOHYDRATE AND SODIUM PHOSPHATE, DIBASIC, ANHYDROUS 20 MMOL: 276; 142 INJECTION, SOLUTION INTRAVENOUS at 11:50

## 2019-01-01 RX ADMIN — ROCURONIUM BROMIDE 70 MG: 10 INJECTION, SOLUTION INTRAVENOUS at 14:07

## 2019-01-01 RX ADMIN — LEVETIRACETAM 1000 MG: 500 TABLET ORAL at 05:29

## 2019-01-01 RX ADMIN — INSULIN HUMAN 3 UNITS: 100 INJECTION, SOLUTION PARENTERAL at 23:17

## 2019-01-01 RX ADMIN — SODIUM PHOSPHATE, MONOBASIC, MONOHYDRATE AND SODIUM PHOSPHATE, DIBASIC, ANHYDROUS 30 MMOL: 276; 142 INJECTION, SOLUTION INTRAVENOUS at 13:00

## 2019-01-01 RX ADMIN — CEFAZOLIN SODIUM 1 G: 1 INJECTION, SOLUTION INTRAVENOUS at 17:35

## 2019-01-01 RX ADMIN — INSULIN HUMAN 3 UNITS: 100 INJECTION, SOLUTION PARENTERAL at 05:16

## 2019-01-01 RX ADMIN — SODIUM CHLORIDE TAB 1 GM 2 G: 1 TAB at 17:19

## 2019-01-01 RX ADMIN — INSULIN LISPRO 3 UNITS: 100 INJECTION, SOLUTION INTRAVENOUS; SUBCUTANEOUS at 23:34

## 2019-01-01 RX ADMIN — LEVETIRACETAM 1000 MG: 100 SOLUTION ORAL at 05:15

## 2019-01-01 RX ADMIN — INSULIN GLARGINE 10 UNITS: 100 INJECTION, SOLUTION SUBCUTANEOUS at 13:16

## 2019-01-01 RX ADMIN — LEVETIRACETAM 500 MG: 500 TABLET ORAL at 05:30

## 2019-01-01 RX ADMIN — SODIUM CHLORIDE 500 MG: 9 INJECTION, SOLUTION INTRAVENOUS at 14:10

## 2019-01-01 RX ADMIN — MORPHINE SULFATE 10 MG: 10 INJECTION INTRAVENOUS at 13:55

## 2019-01-01 RX ADMIN — INSULIN HUMAN 4 UNITS: 100 INJECTION, SOLUTION PARENTERAL at 17:18

## 2019-01-01 RX ADMIN — SODIUM PHOSPHATE, MONOBASIC, MONOHYDRATE AND SODIUM PHOSPHATE, DIBASIC, ANHYDROUS 30 MMOL: 276; 142 INJECTION, SOLUTION INTRAVENOUS at 14:51

## 2019-01-01 RX ADMIN — SODIUM CHLORIDE 500 MG: 9 INJECTION, SOLUTION INTRAVENOUS at 17:41

## 2019-01-01 RX ADMIN — INSULIN HUMAN 3 UNITS: 100 INJECTION, SOLUTION PARENTERAL at 12:25

## 2019-01-01 RX ADMIN — SODIUM CHLORIDE TAB 1 GM 2 G: 1 TAB at 17:00

## 2019-01-01 RX ADMIN — SODIUM CHLORIDE TAB 1 GM 2 G: 1 TAB at 05:31

## 2019-01-01 RX ADMIN — HEPARIN SODIUM 5000 UNITS: 5000 INJECTION, SOLUTION INTRAVENOUS; SUBCUTANEOUS at 06:06

## 2019-01-01 RX ADMIN — LEVETIRACETAM 1000 MG: 100 SOLUTION ORAL at 06:05

## 2019-01-01 RX ADMIN — VANCOMYCIN HYDROCHLORIDE 1800 MG: 100 INJECTION, POWDER, LYOPHILIZED, FOR SOLUTION INTRAVENOUS at 13:01

## 2019-01-01 RX ADMIN — POLYETHYLENE GLYCOL 3350 1 PACKET: 17 POWDER, FOR SOLUTION ORAL at 17:45

## 2019-01-01 RX ADMIN — INSULIN HUMAN 3 UNITS: 100 INJECTION, SOLUTION PARENTERAL at 00:07

## 2019-01-01 RX ADMIN — FAMOTIDINE 20 MG: 10 INJECTION INTRAVENOUS at 17:42

## 2019-01-01 RX ADMIN — SODIUM CHLORIDE: 9 INJECTION, SOLUTION INTRAVENOUS at 06:12

## 2019-01-01 RX ADMIN — FAMOTIDINE 20 MG: 20 TABLET ORAL at 05:31

## 2019-01-01 RX ADMIN — FAMOTIDINE 20 MG: 20 TABLET ORAL at 05:47

## 2019-01-01 RX ADMIN — FAMOTIDINE 20 MG: 20 TABLET ORAL at 05:15

## 2019-01-01 RX ADMIN — SODIUM CHLORIDE TAB 1 GM 1 G: 1 TAB at 17:22

## 2019-01-01 RX ADMIN — HEPARIN SODIUM 5000 UNITS: 5000 INJECTION, SOLUTION INTRAVENOUS; SUBCUTANEOUS at 05:58

## 2019-01-01 RX ADMIN — HEPARIN SODIUM 5000 UNITS: 5000 INJECTION, SOLUTION INTRAVENOUS; SUBCUTANEOUS at 13:54

## 2019-01-01 RX ADMIN — POLYETHYLENE GLYCOL 3350 1 PACKET: 17 POWDER, FOR SOLUTION ORAL at 17:41

## 2019-01-01 RX ADMIN — SODIUM CHLORIDE TAB 1 GM 2 G: 1 TAB at 17:07

## 2019-01-01 RX ADMIN — INSULIN HUMAN 3 UNITS: 100 INJECTION, SOLUTION PARENTERAL at 05:48

## 2019-01-01 RX ADMIN — DOCUSATE SODIUM 100 MG: 50 LIQUID ORAL at 17:45

## 2019-01-01 RX ADMIN — SODIUM PHOSPHATE, MONOBASIC, MONOHYDRATE AND SODIUM PHOSPHATE, DIBASIC, ANHYDROUS 30 MMOL: 276; 142 INJECTION, SOLUTION INTRAVENOUS at 11:26

## 2019-01-01 RX ADMIN — HEPARIN SODIUM 5000 UNITS: 5000 INJECTION, SOLUTION INTRAVENOUS; SUBCUTANEOUS at 21:57

## 2019-01-01 RX ADMIN — INSULIN HUMAN 3 UNITS: 100 INJECTION, SOLUTION PARENTERAL at 00:28

## 2019-01-01 RX ADMIN — SENNOSIDES, DOCUSATE SODIUM 1 TABLET: 50; 8.6 TABLET, FILM COATED ORAL at 21:50

## 2019-01-01 RX ADMIN — LEVETIRACETAM 1000 MG: 100 SOLUTION ORAL at 05:48

## 2019-01-01 RX ADMIN — INSULIN LISPRO 1 UNITS: 100 INJECTION, SOLUTION INTRAVENOUS; SUBCUTANEOUS at 18:30

## 2019-01-01 RX ADMIN — INSULIN HUMAN 4 UNITS: 100 INJECTION, SOLUTION PARENTERAL at 05:30

## 2019-01-01 RX ADMIN — HEPARIN SODIUM 5000 UNITS: 5000 INJECTION, SOLUTION INTRAVENOUS; SUBCUTANEOUS at 05:09

## 2019-01-01 RX ADMIN — HEPARIN SODIUM 5000 UNITS: 5000 INJECTION, SOLUTION INTRAVENOUS; SUBCUTANEOUS at 14:36

## 2019-01-01 RX ADMIN — FAMOTIDINE 20 MG: 20 TABLET ORAL at 06:00

## 2019-01-01 RX ADMIN — POLYETHYLENE GLYCOL 3350 1 PACKET: 17 POWDER, FOR SOLUTION ORAL at 06:06

## 2019-01-01 RX ADMIN — DOCUSATE SODIUM 100 MG: 50 LIQUID ORAL at 17:03

## 2019-01-01 RX ADMIN — HEPARIN SODIUM 5000 UNITS: 5000 INJECTION, SOLUTION INTRAVENOUS; SUBCUTANEOUS at 14:50

## 2019-01-01 RX ADMIN — HEPARIN SODIUM 5000 UNITS: 5000 INJECTION, SOLUTION INTRAVENOUS; SUBCUTANEOUS at 23:13

## 2019-01-01 RX ADMIN — PROPOFOL 20 MCG/KG/MIN: 10 INJECTION, EMULSION INTRAVENOUS at 06:33

## 2019-01-01 RX ADMIN — POLYETHYLENE GLYCOL 3350 1 PACKET: 17 POWDER, FOR SOLUTION ORAL at 18:16

## 2019-01-01 RX ADMIN — INSULIN HUMAN 3 UNITS: 100 INJECTION, SOLUTION PARENTERAL at 17:09

## 2019-01-01 RX ADMIN — INSULIN HUMAN 3 UNITS: 100 INJECTION, SOLUTION PARENTERAL at 12:20

## 2019-01-01 RX ADMIN — CEFAZOLIN SODIUM 1 G: 1 INJECTION, SOLUTION INTRAVENOUS at 17:15

## 2019-01-01 RX ADMIN — INSULIN HUMAN 4 UNITS: 100 INJECTION, SOLUTION PARENTERAL at 17:30

## 2019-01-01 RX ADMIN — LEVETIRACETAM 1000 MG: 100 SOLUTION ORAL at 18:17

## 2019-01-01 RX ADMIN — POLYETHYLENE GLYCOL 3350 1 PACKET: 17 POWDER, FOR SOLUTION ORAL at 17:03

## 2019-01-01 RX ADMIN — INSULIN HUMAN 3 UNITS: 100 INJECTION, SOLUTION PARENTERAL at 23:51

## 2019-01-01 RX ADMIN — INSULIN HUMAN 3 UNITS: 100 INJECTION, SOLUTION PARENTERAL at 00:20

## 2019-01-01 RX ADMIN — HEPARIN SODIUM 5000 UNITS: 5000 INJECTION, SOLUTION INTRAVENOUS; SUBCUTANEOUS at 05:15

## 2019-01-01 RX ADMIN — LEVETIRACETAM 1000 MG: 500 TABLET ORAL at 17:19

## 2019-01-01 RX ADMIN — DOCUSATE SODIUM 100 MG: 50 LIQUID ORAL at 05:31

## 2019-01-01 RX ADMIN — POLYETHYLENE GLYCOL 3350 1 PACKET: 17 POWDER, FOR SOLUTION ORAL at 05:29

## 2019-01-01 RX ADMIN — INSULIN HUMAN 4 UNITS: 100 INJECTION, SOLUTION PARENTERAL at 12:03

## 2019-01-01 RX ADMIN — HEPARIN SODIUM 5000 UNITS: 5000 INJECTION, SOLUTION INTRAVENOUS; SUBCUTANEOUS at 21:54

## 2019-01-01 RX ADMIN — INSULIN HUMAN 3 UNITS: 100 INJECTION, SOLUTION PARENTERAL at 17:28

## 2019-01-01 ASSESSMENT — PULMONARY FUNCTION TESTS
FVC: 0

## 2019-04-10 PROBLEM — Z99.2 DIALYSIS PATIENT (HCC): Status: ACTIVE | Noted: 2019-01-01

## 2019-04-10 PROBLEM — Z98.890 HISTORY OF SUPRAPUBIC CATHETER: Status: ACTIVE | Noted: 2019-01-01

## 2019-04-10 PROBLEM — J96.90 RESPIRATORY FAILURE FOLLOWING TRAUMA (HCC): Status: ACTIVE | Noted: 2019-01-01

## 2019-04-10 PROBLEM — T14.90XA TRAUMA: Status: ACTIVE | Noted: 2019-01-01

## 2019-04-10 PROBLEM — Z53.09 CONTRAINDICATION TO DEEP VEIN THROMBOSIS (DVT) PROPHYLAXIS: Status: ACTIVE | Noted: 2019-01-01

## 2019-04-10 PROBLEM — S06.9XAA TRAUMATIC BRAIN INJURY (HCC): Status: ACTIVE | Noted: 2019-01-01

## 2019-04-10 PROBLEM — S02.91XA CLOSED FRACTURE OF SKULL (HCC): Status: ACTIVE | Noted: 2019-01-01

## 2019-04-10 NOTE — ED NOTES
Pt remains asleep +response to verbal stimuli.  Remains confused, answers questions in incoherent sentences.

## 2019-04-10 NOTE — ASSESSMENT & PLAN NOTE
Acute mildly displaced left frontal bone skull fracture.  Non-operative management.  Joshua Dsouza MD. Neurosurgery

## 2019-04-10 NOTE — ED NOTES
Pt obtunded, arousable.  resp even and unlabored, skin w/d.  Waiting for lab for additional draws WCTM closely.

## 2019-04-10 NOTE — PROGRESS NOTES
Report received, assessment completed. Patient now following commands in all 4 four extremities, sticks out tongue, nods appropriately. Dr. Schreiber and Dr. Horton updated on new assessment findings.

## 2019-04-10 NOTE — ASSESSMENT & PLAN NOTE
[facility], transported to [facility].  Initial non-trauma activation. Upgrade to trauma red after CT imaging demonstrated a traumatic brain injury and skull fracture  Dawit Schreiber MD. Trauma Surgery.

## 2019-04-10 NOTE — CARE PLAN
Problem: Safety  Goal: Will remain free from injury  Safety precautions in place    Problem: Skin Integrity  Goal: Risk for impaired skin integrity will decrease    Intervention: Assess risk factors for impaired skin integrity and/or pressure ulcers  Skin assessment completed

## 2019-04-10 NOTE — PROGRESS NOTES
RN to receive phone number of the patients brother Ezequiel [phone] from the . Rn to notify Neurosurg MD and Trauma MD. RN attempt to call the brother X 2, both times number was busy. RN to give the number to Dr. Harding. MD Harding orders for q 1 neuro checks.

## 2019-04-10 NOTE — ED PROVIDER NOTES
ED Provider Note    CHIEF COMPLAINT  Chief Complaint   Patient presents with   • Syncope   • Altered Mental Status       HPI  Lito Coronado is a 63 y.o. male who presents For evaluation of reported confusion, possible syncope.  We have no medical records on this patient.  Apparently he was outside the [facility].  He has by report a patient there.  Is unclear whether or not he had any trauma or fall he was noted by paramedics to simply be confused.  He kept mumbling that he thought he was in [place] he is moving all of his extremities.  He has a left upper extremity fistula with a fresh bandage on it presumably from a recent dialysis session.  He could not provide any substantial history other than the fact that his name is Lito and that he thinks he is in [place].  There is no report of drugs or alcohol    REVIEW OF SYSTEMS  See HPI for further details.  Unavailable all other systems are negative.     PAST MEDICAL HISTORY  No past medical history on file.  End-stage renal disease     FAMILY HISTORY  [unfilled]    SOCIAL HISTORY  Social History     Social History   • Marital status: N/A     Spouse name: N/A   • Number of children: N/A   • Years of education: N/A     Social History Main Topics   • Smoking status: Not on file   • Smokeless tobacco: Not on file   • Alcohol use Not on file   • Drug use: Unknown   • Sexual activity: Not on file     Other Topics Concern   • Not on file     Social History Narrative   • No narrative on file       SURGICAL HISTORY  No past surgical history on file.  Patient has left upper extremity fistula  CURRENT MEDICATIONS  Home Medications    **Home medications have not yet been reviewed for this encounter**     Unknown unknown    ALLERGIES  Not on File    PHYSICAL EXAM  VITAL SIGNS: /86   Pulse (!) 111   Resp 16   SpO2 92%       Constitutional: No apparent trauma following basic commands  HENT: Normocephalic, Atraumatic, Bilateral external ears  normal, Oropharynx moist, No oral exudates, Nose normal.   Eyes: PERRLA, EOMI, Conjunctiva normal, No discharge.   Neck: Normal range of motion, No tenderness, Supple, No stridor.   Cardiovascular: Normal heart rate, Normal rhythm, No murmurs, No rubs, No gallops.   Thorax & Lungs: Normal breath sounds, No respiratory distress, No wheezing, No chest tenderness.   Abdomen: Bowel sounds normal, Soft, No tenderness, No masses, No pulsatile masses.   Skin: Warm, Dry, No erythema, No rash.   Back: No tenderness, No CVA tenderness.   Extremities: Intact distal pulses, No edema, No tenderness, No cyanosis, No clubbing.   Musculoskeletal: Good range of motion in all major joints. No tenderness to palpation or major deformities noted.   Neurologic: Cranial nerves II through XII are grossly intact.  Patient is confused but directable.  He recalls that he is a [facility] patient he does not recall in the trauma or fall moving all extremities no seizures      RADIOLOGY/PROCEDURES  DX-CHEST-LIMITED (1 VIEW)   Final Result      1.  Endotracheal tube tip projects approximately 3.3 cm above the dustin.      2.  Exam is otherwise not significantly changed.      CT-HEAD W/O   Final Result      1.  There is an acute mildly displaced left frontal bone skull fracture.   2.  There are 2 adjacent areas of intraparenchymal hemorrhage involving the right frontal lobe. There is some surrounding vasogenic edema surrounding the larger area suggesting this may be subacute but the other area appears acute.   3.  There is bilateral acute subdural hematoma, right greater than left with a large amount of blood in the right middle cranial fossa.   4.  There is bilateral acute subarachnoid hemorrhage.   5.  There is right-sided hemispheric edema with 5 mm of right-to-left midline shift.   6.  There is diffuse atrophy.      Findings were discussed with LEONILA FRIAS on 4/10/2019 at 2:11 PM.      DX-CHEST-PORTABLE (1 VIEW)   Final Result       1.  There is no acute cardiopulmonary process.      CT-CSPINE WITHOUT PLUS RECONS    (Results Pending)       Results for orders placed or performed during the hospital encounter of 04/10/19   AMMONIA   Result Value Ref Range    Ammonia 37 11 - 45 umol/L   CBC WITH DIFFERENTIAL   Result Value Ref Range    WBC 13.8 (H) 4.8 - 10.8 K/uL    RBC 4.06 (L) 4.70 - 6.10 M/uL    Hemoglobin 12.6 (L) 14.0 - 18.0 g/dL    Hematocrit 39.2 (L) 42.0 - 52.0 %    MCV 96.6 81.4 - 97.8 fL    MCH 31.0 27.0 - 33.0 pg    MCHC 32.1 (L) 33.7 - 35.3 g/dL    RDW 58.1 (H) 35.9 - 50.0 fL    Platelet Count 338 164 - 446 K/uL    MPV 8.8 (L) 9.0 - 12.9 fL    Neutrophils-Polys 79.00 (H) 44.00 - 72.00 %    Lymphocytes 14.00 (L) 22.00 - 41.00 %    Monocytes 5.50 0.00 - 13.40 %    Eosinophils 0.50 0.00 - 6.90 %    Basophils 0.50 0.00 - 1.80 %    Immature Granulocytes 0.50 0.00 - 0.90 %    Nucleated RBC 0.00 /100 WBC    Neutrophils (Absolute) 10.86 (H) 1.82 - 7.42 K/uL    Lymphs (Absolute) 1.93 1.00 - 4.80 K/uL    Monos (Absolute) 0.76 0.00 - 0.85 K/uL    Eos (Absolute) 0.07 0.00 - 0.51 K/uL    Baso (Absolute) 0.07 0.00 - 0.12 K/uL    Immature Granulocytes (abs) 0.07 0.00 - 0.11 K/uL    NRBC (Absolute) 0.00 K/uL   Comp Metabolic Panel   Result Value Ref Range    Sodium 136 135 - 145 mmol/L    Potassium 3.7 3.6 - 5.5 mmol/L    Chloride 93 (L) 96 - 112 mmol/L    Co2 29 20 - 33 mmol/L    Anion Gap 14.0 (H) 0.0 - 11.9    Glucose 180 (H) 65 - 99 mg/dL    Bun 17 8 - 22 mg/dL    Creatinine 2.92 (H) 0.50 - 1.40 mg/dL    Calcium 8.8 8.5 - 10.5 mg/dL    AST(SGOT) 15 12 - 45 U/L    ALT(SGPT) 6 2 - 50 U/L    Alkaline Phosphatase 150 (H) 30 - 99 U/L    Total Bilirubin 1.7 (H) 0.1 - 1.5 mg/dL    Albumin 3.1 (L) 3.2 - 4.9 g/dL    Total Protein 6.8 6.0 - 8.2 g/dL    Globulin 3.7 (H) 1.9 - 3.5 g/dL    A-G Ratio 0.8 g/dL   LIPASE   Result Value Ref Range    Lipase <3 (L) 11 - 82 U/L   TROPONIN   Result Value Ref Range    Troponin I 0.60 (H) 0.00 - 0.04 ng/mL   BTYPE  NATRIURETIC PEPTIDE   Result Value Ref Range    B Natriuretic Peptide >5000 (H) 0 - 100 pg/mL   ESTIMATED GFR   Result Value Ref Range    GFR If  26 (A) >60 mL/min/1.73 m 2    GFR If Non African American 22 (A) >60 mL/min/1.73 m 2   ARTERIAL BLOOD GAS   Result Value Ref Range    Ph 7.47 7.40 - 7.50    Pco2 34.4 26.0 - 37.0 mmHg    Po2 203.1 (H) 64.0 - 87.0 mmHg    O2 Saturation 99.1 (H) 93.0 - 99.0 %    Hco3 24 17 - 25 mmol/L    Base Excess 1 -4 - 3 mmol/L    Body Temp see below Centigrade   CBC WITHOUT DIFFERENTIAL   Result Value Ref Range    WBC 15.8 (H) 4.8 - 10.8 K/uL    RBC 4.20 (L) 4.70 - 6.10 M/uL    Hemoglobin 13.0 (L) 14.0 - 18.0 g/dL    Hematocrit 41.0 (L) 42.0 - 52.0 %    MCV 97.6 81.4 - 97.8 fL    MCH 31.0 27.0 - 33.0 pg    MCHC 31.7 (L) 33.7 - 35.3 g/dL    RDW 59.7 (H) 35.9 - 50.0 fL    Platelet Count 293 164 - 446 K/uL    MPV 9.0 9.0 - 12.9 fL   LACTIC ACID   Result Value Ref Range    Lactic Acid 2.0 0.5 - 2.0 mmol/L   ACCU-CHEK GLUCOSE   Result Value Ref Range    Glucose - Accu-Ck 164 (H) 65 - 99 mg/dL   EKG (NOW)   Result Value Ref Range    Report       [facility] Emergency Dept.    Test Date:  2019-04-10  Pt Name:    ILDEFONSO SYED               Department: ER  MRN:        [id number]                      Room:       [facility]  Gender:     Male                         Technician: [id number]  :        1955                   Requested By:ER TRIAGE PROTOCOL  Order #:    [id number]                    Reading MD: LEONILA FRIAS MD    Measurements  Intervals                                Axis  Rate:       115                          P:          50  MS:         153                          QRS:        56  QRSD:       80                           T:          88  QT:         333  QTc:        461    Interpretive Statements  Sinus tachycardia  Borderline repolarization abnormality  No previous ECG available for comparison    Electronically Signed On 4- 12:36:31 PDT by  LEONILA FRIAS MD        Physician procedure: Endotracheal intubation indication airway reduction with worsening mental status in the setting of intracranial hemorrhage.  Patient was placed on continuous pulse oximetry cardiac monitoring and supple oxygen.  He was given 60 mg of IV propofol, 70 mg of rocuronium.  A curved #4 blade was inserted in the oropharynx an 8.0 tube was passed at 24 cm.  The cuff was inflated good CO2 return chest x-ray confirmed placement no comp occasions      COURSE & MEDICAL DECISION MAKING  Pertinent Labs & Imaging studies reviewed. (See chart for details)  Patient presented here with altered mental status.  By report from paramedics and nursing staff there is no report of any trauma.  He presents here generally confused.  My main and initial concern was metabolic etiology or possible drug or alcohol etiology.  Blood sugar was normal.  He had no response to a dose of Narcan.  Had ordered a CT scan of the head when I initially evaluated the patient.  There is significant delay in getting this performed due to high volumes today.  The patient was checked on every 10-15 minutes and he became somewhat more confused when he got back from CAT scan he had gurgling respirations and had some posturing type behavior but no active seizures.  Once I reviewed the images that this was traumatic intracranial hemorrhage with skull fracture he was immediately upgraded to a trauma red and taken to a resuscitation room and subsequently intubated.  Emergent consultation with neurosurgery was obtained with Dr. Walker.  I have added on a CT scan of the C-spine as this was not initially a concern but now that we determined that this was trauma additional studies were added on including CT his C-spine, platelet studies, coagulation profile etc.  Patient will be admitted to the trauma ICU in guarded condition    FINAL IMPRESSION  1.  Acute traumatic intracranial hemorrhage with coma  2.  End-stage renal  disease    Critical care time: 40 minutes.  This excludes procedures includes consultation with specialist and direct management of the patient at the bedside         Electronically signed by: Mik Deng, 4/10/2019 10:25 AM

## 2019-04-10 NOTE — ASSESSMENT & PLAN NOTE
Systemic anticoagulation contraindicated secondary to elevated bleeding risk.  4/12 Surveillance venous duplex negative  4/13 prophylactic heparin started due to renal impairment

## 2019-04-10 NOTE — ED NOTES
[facility], transported to [facility]. After CT pt upgraded to a Trauma Red

## 2019-04-10 NOTE — ED NOTES
MD Orellana at bedside for acute neuro change. Pt non verbal, snoring respirations, upper extremity posturing noted by RN.  Pt no longer following commands or tracking with eyes.  accucheck 164.   Pharmacy and RT called, preparing to intubate patient.

## 2019-04-10 NOTE — ED NOTES
Pt remains asleep on continuous cardiac monitoring.  Resp even and unlabored, pt obtunded appearing.   WCTM.

## 2019-04-10 NOTE — DISCHARGE PLANNING
Trauma Response    Referral: Trauma Red Response    Intervention: SW responded to trauma red.  Pt was BIB [facility] after fall.  Pt was extremely confused upon arrival.  Pts name is Lito Coronado (: 1955).  SW obtained the following pt information: Pt was [facility]. Pt was roomed and taken to CT where pt became non verbal and no longer following commands. Pt was upgraded to Trauma Red and was intubated in trauma bay. [facility] and was given name of pt's brother, Ezequiel Coronado ([phone]), who was listed as his only emergency contact. SW provided brother's name and number to ICU RN.     Plan: LSW will remain available as needed.

## 2019-04-10 NOTE — ASSESSMENT & PLAN NOTE
Intubated in trauma bay  Tracheostomy remains on hold until family can meet  Continue SICU weaning protocol   Continue ventilator bundle

## 2019-04-10 NOTE — H&P
"TRAUMA HISTORY AND PHYSICAL    CHIEF COMPLAINT:  Traumatic brain injury, renal failure.      HISTORY OF PRESENT ILLNESS: The patient is a 63  Year old male admitted to the ED with confusion.  CT was performed and showed significant intraparenchymal hemorrhage, edema and bilateral subdural hematoma.  The patient decompensated following CT and was intubated.  The CT suggested traumatic origian and a trauma red was called.  In the trauma bay GCS was 3T.  He was accompanied to CT for neck films then to the ticu.  Reexam with neurosurgery again demonstrated a 3T GCS and no corneals.  He is a renal failure patient.  In ICU GCS was initially 3 then improved to 11T.         PAST MEDICAL HISTORY:   renal failure.      PAST SURGICAL HISTORY: patient denies any surgical history     ALLERGIES: Not on File     CURRENT MEDICATIONS:   Home Medications     Reviewed by Mony Dodson, Pharmacy Intern (Pharmacy Intern) on 04/10/19 at 1405  Med List Status: Complete   Medication Last Dose Status   lidocaine (LIDODERM) 5 % Patch  Active   sevelamer (RENAGEL) 800 MG Tab  Active   simethicone (MYLICON) 80 MG Chew Tab  Active                FAMILY HISTORY: No family history on file.     SOCIAL HISTORY:   Social History     Social History Main Topics   • Smoking status: Not on file   • Smokeless tobacco: Not on file   • Alcohol use Not on file   • Drug use: Unknown   • Sexual activity: Not on file       REVIEW OF SYSTEMS: Comprehensive review of systems is not possible due to coma  PHYSICAL EXAMINATION:     GENERAL: Intubated.   VITAL SIGNS: /81   Pulse (!) 110   Temp (!) 35.7 °C (96.2 °F) (Temporal)   Resp (!) 22   Ht 1.803 m (5' 11\")   Wt 77 kg (169 lb 12.1 oz)   SpO2 96%   HEAD AND NECK: Pupils:  Equal and Reactive  Dentition: Occlusion is difficult to assess.    Facial:  Symmetrical.    NECK: No JVD. Trachea midline. Cervical tenderness is  Absent.  CHEST: Breath sounds are equal. No sternal tenderness.  No  lateral rib " tenderness.  CARDIOVASCULAR: Regular rhythm.    ABDOMEN: Soft, no tenderness guarding or peritoneal findings. Suprapubic catheter.    PELVIS: Stable.  EXTREMITIES: Examination of the upper and lower extremities : No gross long bone or joint deformity.  Multiple areas of skin discoloration .  Dialysis fistula present.   BACK: No midline stepoffs.  No Tenderness  NEUROLOGIC: Justen Coma Score is   3t.   Flaccid.      LABORATORY VALUES:   Recent Labs      04/10/19   1000  04/10/19   1419   WBC  13.8*  15.8*   RBC  4.06*  4.20*   HEMOGLOBIN  12.6*  13.0*   HEMATOCRIT  39.2*  41.0*   MCV  96.6  97.6   MCH  31.0  31.0   MCHC  32.1*  31.7*   RDW  58.1*  59.7*   PLATELETCT  338  293   MPV  8.8*  9.0     Recent Labs      04/10/19   1123  04/10/19   1419   SODIUM  136  134*   POTASSIUM  3.7  4.0   CHLORIDE  93*  94*   CO2  29  25   GLUCOSE  180*  178*   BUN  17  18   CREATININE  2.92*  3.01*   CALCIUM  8.8  9.0     Recent Labs      04/10/19   1123  04/10/19   1419   ASTSGOT  15  22   ALTSGPT  6  <5   TBILIRUBIN  1.7*  1.9*   ALKPHOSPHAT  150*  164*   GLOBULIN  3.7*  4.1*   AMMONIA  37   --             IMAGING:   CT-CSPINE WITHOUT PLUS RECONS   Final Result      1.  No acute fractures identified.      2.  Moderate to severe degenerative disc disease at C5-6 and C6-7.      3.  Small right pleural effusion      4.  Interlobular septal thickening is suggestive of pulmonary edema.      5.  Atherosclerosis      DX-CHEST-LIMITED (1 VIEW)   Final Result      1.  Endotracheal tube tip projects approximately 3.3 cm above the dustin.      2.  Exam is otherwise not significantly changed.      CT-HEAD W/O   Final Result      1.  There is an acute mildly displaced left frontal bone skull fracture.   2.  There are 2 adjacent areas of intraparenchymal hemorrhage involving the right frontal lobe. There is some surrounding vasogenic edema surrounding the larger area suggesting this may be subacute but the other area appears acute.   3.  There  is bilateral acute subdural hematoma, right greater than left with a large amount of blood in the right middle cranial fossa.   4.  There is bilateral acute subarachnoid hemorrhage.   5.  There is right-sided hemispheric edema with 5 mm of right-to-left midline shift.   6.  There is diffuse atrophy.      Findings were discussed with LEONILA FRIAS on 4/10/2019 at 2:11 PM.      DX-CHEST-PORTABLE (1 VIEW)   Final Result      1.  There is no acute cardiopulmonary process.      CT-HEAD W/O    (Results Pending)       IMPRESSION AND PLAN:  [facility]  [facility], transported to [facility].  Initial non-trauma activation. Upgrade to trauma red after CT imaging demonstrated a traumatic brain injury and skull fracture  Dawit Schreiber MD. Trauma Surgery.    Contraindication to deep vein thrombosis (DVT) prophylaxis  Systemic anticoagulation contraindicated secondary to elevated bleeding risk.  4/12 Surveillance venous duplex scanning ordered.    History of suprapubic catheter  Present prior to arrival     Respiratory failure following trauma (HCC)  Intubated in trauma bay  Continue full mechanical ventilatory support. Ventilator bundle and Trauma weaning protocol.    Traumatic brain injury (HCC)  2 adjacent areas of intraparenchymal hemorrhage involving the right frontal lobe with some surrounding vasogenic edema  Bilateral acute subdural hematoma, right greater than left with a large amount of blood in the right middle cranial fossa.  Bilateral acute subarachnoid hemorrhage.  There is right-sided hemispheric edema with 5 mm of right-to-left midline shift.   Anti-seizure prophylaxis, Keppra, x 7 days  4/10 Exam improved to 11T.  Operative therapy and ICP deferred. Serial CT .     Non-operative management.  Joshua Dsouza MD. Neurosurgery.    Closed fracture of skull (HCC)  Acute mildly displaced left frontal bone skull fracture.  Non-operative management.  Joshua Dsouza MD.  Neurosurgery.    Critical Care:  50 minutes including bedside, review of imaging , consultation with neurosurgery, mechanical  Ventilation.  Poor prognosis.  No family available, neurosurgery recommends against operative therapy.  Currently GCS is 11t.    A line and Central line deferred.  Follow clinically.      ____________________________________   Dawit Schreiber MD, FACS      DD: 4/10/2019   DT: 2:46 PM

## 2019-04-10 NOTE — PROGRESS NOTES
Spoke with nurse, patient following commands and regarding  Medications had clearly masked exam previously  Will order follow up CT, He has a reliable exam at this point which we can follow clinically  Ok for q2h neuro checks

## 2019-04-10 NOTE — FLOWSHEET NOTE
Respiratory Trauma Red Note    Intubation yes  Positive Color Change on EZCap? yes  Difficult Airway no  Number of attempts 1  Evidence of aspiration no  Airway ETT 8.0-Secured At  (cm): 24 (04/10/19 1449)  Mckenzie Vent Mode: APVCMV (04/10/19 1449)     Rate (breaths/min): 22 (04/10/19 1449)  Vt Target (mL): 450 (04/10/19 1449)  FiO2: 60 (04/10/19 1449)  PEEP/CPAP: 8 (04/10/19 1449)    Events/Summary/Plan: Patient upgraded to trauma red. Intubated per Dr Alvarez. See trauma narrator notes for full details (04/10/19 1449)

## 2019-04-10 NOTE — ASSESSMENT & PLAN NOTE
2 adjacent areas of intraparenchymal hemorrhage involving the right frontal lobe with some surrounding vasogenic edema  Bilateral acute subdural hematoma, right greater than left with a large amount of blood in the right middle cranial fossa.  Bilateral acute subarachnoid hemorrhage.  There is right-sided hemispheric edema with 5 mm of right-to-left midline shift.   4/10 Exam improved to 11T.  Surgery deferred.  4/15 Seizure on spot EEG: Keppra increased  4/17 24-hour EEG without sign of seizures: Continue current Keppra regimen  Discussed with family: Brain injury in the setting of multiple comorbidities with suboptimal prognosis  They requested we continue care for now  Non-operative management.   Joshua Dsouza MD. Neurosurgery.

## 2019-04-10 NOTE — ED TRIAGE NOTES
Pt to ED via EMS [facility] this AM after MD appointment.  Per EMS, bystander reported patient appeared to be unresponsive x5 minutes.  Pt arrives to ED extremely confused, lethargic/drowsy and only alert x1 (self).  Pt has fistula to LUE, unable to reported any medication,medical hx.  Pt unable to recall events of this AM, MD appointment extra.  Pt has distended abdomen, denies pain.  Resp even and unlabored, skin w/d/i, slightly pale/jaundice.

## 2019-04-10 NOTE — PROGRESS NOTES
2 RN skin check completed.     -Coccyx and lower back is discolored with red marking non blanching  -Mid upper back has a red and blanching raised lump  -foot/heel is black and red non blanching.     Mepilex placed and heels floated.

## 2019-04-10 NOTE — CONSULTS
DATE OF SERVICE:  04/10/2019    REQUESTING PHYSICIAN:  Mik Deng MD    REASON FOR CONSULTATION:  Subdural hematoma, intraparenchymal hemorrhage.    HISTORY OF PRESENT ILLNESS:  This is a 63-year-old  with ESRD, on   dialysis, [facility]  [facility] and they were on divert and he confused and potential had a   syncopal event.  EMS stated that he was quite confused and was brought in to   [facility] Emergency Room.  A head CT was performed that showed a very significant   intraparenchymal hemorrhages in the right inferior frontal lobe, right   temporal lobe with small subdural hematomas on the right and left.  Otherwise,   history could not be obtained.  He had a decompensation in the ER and was   intubated immediately after his CT evaluation of his head.    PAST MEDICAL HISTORY:  Unknown.    PAST SURGICAL HISTORY:  Unknown.    FAMILY HISTORY:  Unknown.    REVIEW OF SYSTEMS:  Unable to be obtained due to the patient's condition.    HOME MEDICATIONS:  Unknown.    ALLERGIES:  Unknown.    PHYSICAL EXAMINATION:  VITAL SIGNS:  Afebrile, vital signs stable.  GENERAL:  Intubated and not sedated.  HEENT:  Normocephalic, atraumatic.  Pupils are asymmetric and anisocoric and   nonreactive, about 4-5 mm, the right side larger than the left.  NEUROLOGIC:  GCS of 3T.  No movement in his upper extremity to central pain.    He may have some spinal reflex in the left lower extremity to peripheral   stimulation.  There are no corneal, no gag, no cough reflexes.  No   oculocephalic reflex and no corneal reflex.    LABORATORY DATA:  Reviewed in EPIC.    IMAGING STUDIES:  I have reviewed his head CT, which shows large right   inferior frontal hemorrhagic contusions, intraparenchymal hemorrhages in the   left frontal and temporal lobes.  He has no cerebral herniation.  There is   some extraaxial acute hematoma in the bilateral convexities.  There is a small    nondisplaced frontal bone fracture.  There is some generalized edema over the   right hemisphere with minimal shift.    ASSESSMENT:  A 63-year-old  with numerous traumatic intraparenchymal   hemorrhages, subdural hematoma with small amount displaced skull fracture.  At   this point, his neurologic exam is extremely poor.  We will have to see how   this comes over the next several hours and we will continue to evaluate him   for this.  At this point, there is no surgical intervention given the   intraparenchymal nature of his hemorrhages.  He has no mass effect causing any   herniation at this point and so I do not feel that a surgical intervention   would benefit him.  I am happy to place a bolt in him if his neurologic exam   improves a bit here that is something meaningful, but at this point, further   treatment is not likely to impact his outcome.  He has a very poor prognosis   at this point.  We will continue to follow.    PLAN:  No surgical intervention is planned at this time.  There is no mass   lesion to take out, which might result in improved outcome.  He has an   exceptionally poor neurologic exam and is in very critical condition.    We tried contacting his brother twice at this time, but are not able to make   contact.  I will try again shortly to see if we can make contact      I spent a total of 73 critical care minutes on history and physical examination as well as review of EMR and imaging        ____________________________________     Joshua Dsouza MD SA / DYLAN    DD:  04/10/2019 15:09:58  DT:  04/10/2019 15:29:09    D#:  [id number]  Job#:  [id number]

## 2019-04-10 NOTE — PROGRESS NOTES
[place] j cervical collar has been applied and fitted to pt. Trauma Dr confirmed ok to remove c collar so collar was then removed.

## 2019-04-10 NOTE — DISCHARGE PLANNING
Called Vanesa at  [phone]. Pt does not have AD but NOK is brother, Ezequiel [phone], lives in [place]. Attempting to contact avelino Nieves.

## 2019-04-10 NOTE — CARE PLAN
Problem: Ventilation Defect:  Goal: Ability to achieve and maintain unassisted ventilation or tolerate decreased levels of ventilator support    Intervention: Support and monitor invasive and noninvasive mechanical ventilation  Adult Ventilation Update    Total Vent Days: 1    Patient Lines/Drains/Airways Status    Active Airway     Name: Placement date: Placement time: Site: Days:    Airway ETT 8.0 @ 24 04/10/19   1409      less than 1              APV: 22, 450, +8, 60%

## 2019-04-11 PROBLEM — N18.6 END STAGE RENAL DISEASE (HCC): Status: ACTIVE | Noted: 2019-01-01

## 2019-04-11 PROBLEM — Z99.2 DIALYSIS PATIENT (HCC): Status: RESOLVED | Noted: 2019-01-01 | Resolved: 2019-01-01

## 2019-04-11 PROBLEM — N18.9 CHRONIC RENAL FAILURE: Status: ACTIVE | Noted: 2019-01-01

## 2019-04-11 PROBLEM — N18.9 CHRONIC RENAL FAILURE: Status: RESOLVED | Noted: 2019-01-01 | Resolved: 2019-01-01

## 2019-04-11 NOTE — DISCHARGE PLANNING
Outpatient Dialysis Note    Confirmed patient is active at:    [facility]  [address] [place] [address]       Schedule: Tuesday, Thursday, Saturday  Time: 10:45 am    Spoke with Aminata at facility who confirmed.    Forwarded records for review.    Dialysis Coordinator, [facility]  Yuli Bar [phone]

## 2019-04-11 NOTE — DISCHARGE PLANNING
Called D for welfare check on manpreeterEzequiel at [phone] with request to call SICU.

## 2019-04-11 NOTE — PROGRESS NOTES
Cortrak Placement    Tube Team verified patient name and medical record number prior to tube placement.  Cortrak tube (55 inches, 10 Vietnamese) placed at 63 cm in right nare.  Per Cortrak picture, tube appears to be in the stomach.  Nursing Instructions: Awaiting KUB to confirm placement before use for medications or feeding. Once placement confirmed, flush tube with 30 ml of water, and then remove and save stylet, in patient medication drawer.

## 2019-04-11 NOTE — ASSESSMENT & PLAN NOTE
Chronic dialysis patient with mature extremity fistula  3X/week dialysis  Dr. Birmingham, nephrology

## 2019-04-11 NOTE — PROGRESS NOTES
Patients propofol has remained off since previous note, as patients neuro status seems to have declined. While propofol off, patient began mildly twitching/shaking in all four extremities. Pupils unchanged, vital signs stable. Propofol restarted and shaking stopped.    Neurosurgery and Dr. Schreiber notified

## 2019-04-11 NOTE — PROGRESS NOTES
Trauma / Surgical Daily Progress Note    Date of Service  4/11/2019    Chief Complaint  63 y.o. male admitted 4/10/2019 with Trauma    Interval Events  New admit - 63 year old ESRD patient with severe traumatic brain injury. Non operative management. [facility] Next of kin could not be located.   Makes adequate urine volumes and seems to be filtering his electrolytes.    Review of Systems  Review of Systems     Vital Signs for last 24 hours  Temp:  [35.7 °C (96.2 °F)-37.2 °C (98.9 °F)] 36.7 °C (98 °F)  Pulse:  [] 111  Resp:  [19-24] 22  SpO2:  [96 %-100 %] 100 %    Hemodynamic parameters for last 24 hours       Respiratory Data     Respiration: (!) 22, Pulse Oximetry: 100 %     Work Of Breathing / Effort: Vented  RUL Breath Sounds: Clear, RML Breath Sounds: Clear, RLL Breath Sounds: Diminished, ADRIEL Breath Sounds: Clear, LLL Breath Sounds: Diminished    Physical Exam  Physical Exam   Constitutional:   Intubated, NAD   HENT:   Head: Normocephalic and atraumatic.   Eyes:   Pupils 3mm and sluggish   Neck: Normal range of motion. No tracheal deviation present. No thyromegaly present.   Cardiovascular: Normal rate and regular rhythm.    Pulmonary/Chest: Effort normal and breath sounds normal.   Abdominal: Soft.   Suprapubic tube in place   Genitourinary: Penis normal.   Musculoskeletal: Normal range of motion.   Left medial upper arm AV fistula with thrill   Neurological:   Withdrawals all extremities to painful stimuli   Skin: Skin is warm and dry.   Psychiatric:   Unable to assess     Nursing note and vitals reviewed.      Laboratory  Recent Results (from the past 24 hour(s))   ACCU-CHEK GLUCOSE    Collection Time: 04/10/19  5:46 PM   Result Value Ref Range    Glucose - Accu-Ck 185 (H) 65 - 99 mg/dL   ACCU-CHEK GLUCOSE    Collection Time: 04/11/19 12:24 AM   Result Value Ref Range    Glucose - Accu-Ck 136 (H) 65 - 99 mg/dL   CBC with Differential: Tomorrow AM    Collection Time: 04/11/19   3:15 AM   Result Value Ref Range    WBC 14.1 (H) 4.8 - 10.8 K/uL    RBC 3.64 (L) 4.70 - 6.10 M/uL    Hemoglobin 11.4 (L) 14.0 - 18.0 g/dL    Hematocrit 34.1 (L) 42.0 - 52.0 %    MCV 93.7 81.4 - 97.8 fL    MCH 31.3 27.0 - 33.0 pg    MCHC 33.4 (L) 33.7 - 35.3 g/dL    RDW 56.5 (H) 35.9 - 50.0 fL    Platelet Count 295 164 - 446 K/uL    MPV 9.1 9.0 - 12.9 fL    Neutrophils-Polys 84.90 (H) 44.00 - 72.00 %    Lymphocytes 8.80 (L) 22.00 - 41.00 %    Monocytes 5.70 0.00 - 13.40 %    Eosinophils 0.00 0.00 - 6.90 %    Basophils 0.20 0.00 - 1.80 %    Immature Granulocytes 0.40 0.00 - 0.90 %    Nucleated RBC 0.00 /100 WBC    Neutrophils (Absolute) 11.96 (H) 1.82 - 7.42 K/uL    Lymphs (Absolute) 1.24 1.00 - 4.80 K/uL    Monos (Absolute) 0.80 0.00 - 0.85 K/uL    Eos (Absolute) 0.00 0.00 - 0.51 K/uL    Baso (Absolute) 0.03 0.00 - 0.12 K/uL    Immature Granulocytes (abs) 0.05 0.00 - 0.11 K/uL    NRBC (Absolute) 0.00 K/uL   Comp Metabolic Panel (CMP): Tomorrow AM    Collection Time: 04/11/19  3:15 AM   Result Value Ref Range    Sodium 137 135 - 145 mmol/L    Potassium 3.4 (L) 3.6 - 5.5 mmol/L    Chloride 98 96 - 112 mmol/L    Co2 24 20 - 33 mmol/L    Anion Gap 15.0 (H) 0.0 - 11.9    Glucose 151 (H) 65 - 99 mg/dL    Bun 20 8 - 22 mg/dL    Creatinine 2.96 (H) 0.50 - 1.40 mg/dL    Calcium 8.0 (L) 8.5 - 10.5 mg/dL    AST(SGOT) 10 (L) 12 - 45 U/L    ALT(SGPT) <5 2 - 50 U/L    Alkaline Phosphatase 106 (H) 30 - 99 U/L    Total Bilirubin 1.6 (H) 0.1 - 1.5 mg/dL    Albumin 2.5 (L) 3.2 - 4.9 g/dL    Total Protein 5.7 (L) 6.0 - 8.2 g/dL    Globulin 3.2 1.9 - 3.5 g/dL    A-G Ratio 0.8 g/dL   MAGNESIUM    Collection Time: 04/11/19  3:15 AM   Result Value Ref Range    Magnesium 1.8 1.5 - 2.5 mg/dL   PHOSPHORUS    Collection Time: 04/11/19  3:15 AM   Result Value Ref Range    Phosphorus 2.8 2.5 - 4.5 mg/dL   ESTIMATED GFR    Collection Time: 04/11/19  3:15 AM   Result Value Ref Range    GFR If  26 (A) >60 mL/min/1.73 m 2    GFR If  Non  21 (A) >60 mL/min/1.73 m 2   ACCU-CHEK GLUCOSE    Collection Time: 04/11/19  1:47 PM   Result Value Ref Range    Glucose - Accu-Ck 147 (H) 65 - 99 mg/dL       Fluids    Intake/Output Summary (Last 24 hours) at 04/11/19 1445  Last data filed at 04/11/19 1400   Gross per 24 hour   Intake           2316.2 ml   Output              410 ml   Net           1906.2 ml       Core Measures & Quality Metrics  Labs reviewed, Medications reviewed and Radiology images reviewed  Jacob catheter: Critically Ill - Requiring Accurate Measurement of Urinary Output  Central line in place: Need for access    DVT Prophylaxis: Contraindicated - High bleeding risk  DVT prophylaxis - mechanical: SCDs  Ulcer prophylaxis: Yes        FLORECITA Score  ETOH Screening    Assessment/Plan  Closed fracture of skull (HCC)- (present on admission)   Assessment & Plan    Acute mildly displaced left frontal bone skull fracture.  Non-operative management.  Joshua Dsouza MD. Neurosurgery.     Traumatic brain injury (HCC)- (present on admission)   Assessment & Plan    2 adjacent areas of intraparenchymal hemorrhage involving the right frontal lobe with some surrounding vasogenic edema  Bilateral acute subdural hematoma, right greater than left with a large amount of blood in the right middle cranial fossa.  Bilateral acute subarachnoid hemorrhage.  There is right-sided hemispheric edema with 5 mm of right-to-left midline shift.   Anti-seizure prophylaxis, Keppra, x 7 days  4/10 Exam improved to 11T.  Operative therapy and ICP deferred. Serial CT .     Non-operative management.  Joshua Dsouza MD. Neurosurgery.     Respiratory failure following trauma (HCC)- (present on admission)   Assessment & Plan    Intubated in trauma bay  Continue full mechanical ventilatory support. Ventilator bundle and Trauma weaning protocol.     End stage renal disease (HCC)   Assessment & Plan    Chronic dialysis patient, [facility]  4/11 -  nephrology consult, plan dialysis 4/12     History of suprapubic catheter- (present on admission)   Assessment & Plan    Present prior to arrival      Contraindication to deep vein thrombosis (DVT) prophylaxis- (present on admission)   Assessment & Plan    Systemic anticoagulation contraindicated secondary to elevated bleeding risk.  4/12 Surveillance venous duplex scanning ordered.     Trauma- (present on admission)   Assessment & Plan    [facility] ground level fall, transported to [facility].  Initial non-trauma activation. Upgrade to trauma red after CT imaging demonstrated a traumatic brain injury and skull fracture  Dawit Schreiber MD. Trauma Surgery.       Plan:  Non operative management of severe TBI - follow exam.  Appreciate nephrology consult - plan HD tomorrow  Initiate enteral nutritional support  Duplex screening within 48 hours of admission    We have been unable to locate his NOK or any POA (apparently is his brother) despite multiple attempts and police visit to his house. Will consider ethics consult if unable to locate NOK in the next few days      Discussed patient condition with RN, RT and Pharmacy. And Dr. Schreiber  The patient is/remains critically ill with severe traumatic brain injury, renal failure, respiratory failure.    I provided the following critical care services: ventilator management and weaning, high risk medication management, resuscitation, bedside communication with consulting physicians.    Critical care time spent exclusive of procedures: 82 minutes.    Jose Marcos MD  [phone]

## 2019-04-11 NOTE — NON-PROVIDER
Patient Summary:  Lito Coronado is a 64 yo male with ESRD on dialysis who [facility] presumably [facility] from a syncopal event. Patient was found on head CT to have intraparenchymal hemorrhages in two areas of the right frontal lobe and was subsequently intubated. There is also bilateral acute subdural hematoma, and bilateral acute subarachnoid hemorrhage.    24 Hour Events: See above; No acute overnight events    SUBJECTIVE/OBJECTIVE:  NEURO:  GCS  24hr: 4T   Current: 6T   Exam Unable to complete due to patient being unresponsive   Meds/Pain Keppra 500mg in NS 100ml 2 times daily;  Continuous propofol 0-80mcg/kg/min;   IV morphine 2mg q1 hr PRN   Labs Diagnostic alcohol 0.00 on admission   Imaging Repeat CT- Head w/o contrast last night shows stable bilateral frontal parenchymal hemorrhages consistent with hemorrhagic contusions, stable subarachnoid hemorrhage, stable bilateral subdural hemorrhages, and left frontal bone fractures.  CT- Cspine w/o contrast yesterday shows no acute fractures      RESP:  RR, SpO2  RR: 22, SpO2: 99%   Vent Mode: Adaptive support ventilation; rate: 22; FiO2: 30%, PEEP: 8.0   Exam Equal breath sounds bilaterally, no wheezes rales or rhonchi    Meds  None   Labs ABG: pH: 7.47;  PCO2: 34.4;  PO2: 203.1;  HCO3: 24;   Base Excess: 1   Imaging CXR today shows rib fracture, no other cardiopulmonary processes evident     CV:  HR/BP/MAP HR: 80-90s; BP: /60s; MAP: 70s   Exam  Normal S1,S2; No rubs murmurs or gallops   Meds  None   Labs  Troponin: 0.60; BNP: >5000 on admission    Imaging  CXR: Left-sided rib fractures; EKG shows sinus tachycardia with borderline repolarization abnormality.     GI:  Diet/Bowel Function NPO  Famotidine 20mg daily  Milk of Magnesia 30ml daily  Miralax 1 packet two times daily  Senna-docusate 1 tab nightly.  Zofran 4mg q4 hours PRN  Simethicone 80mg chew tab four times daily  Fleet enema 133 mL 1 each once PRN   Exam Bowel  sounds normal   Labs AST: 10; ALT <5; Alk phosphatase: 106; TBili: 1.6; Albumin: 2.5; Total Protein 5.7   Imaging None     F/E/N-:  I/O  24hr totals:     Intake/Output Summary (Last 24 hours) at 04/11/19 0831  Last data filed at 04/11/19 0600   Gross per 24 hour   Intake          1540.95 ml   Output              340 ml   Net          1200.95 ml      Intake: 1540.95 ml   Output: 340 ml     Suprapubic catheter in place                        Exam Catheter in place with no signs of erythema or infeciton; urine color normal and nonbloody.   Meds D5 25ml q15 min   Labs Recent Labs      04/10/19   1123  04/10/19   1419  04/11/19   0315   SODIUM  136  134*  137   POTASSIUM  3.7  4.0  3.4*   CHLORIDE  93*  94*  98   CO2  29  25  24   GLUCOSE  180*  178*  151*   BUN  17  18  20     Calcium: 8.0  Magnesium: 1.8  Phosphorus: 2.8  Lactic acid: 2.0  Cr: 2.96   Nutrition  NPO     HEME:  Labs    Recent Labs      04/10/19   1000  04/10/19   1419  04/11/19 0315   WBC  13.8*  15.8*  14.1*   RBC  4.06*  4.20*  3.64*   HEMOGLOBIN  12.6*  13.0*  11.4*   HEMATOCRIT  39.2*  41.0*  34.1*   MCV  96.6  97.6  93.7   MCH  31.0  31.0  31.3   RDW  58.1*  59.7*  56.5*   PLATELETCT  338  293  295   MPV  8.8*  9.0  9.1   NEUTSPOLYS  79.00*   --   84.90*   LYMPHOCYTES  14.00*   --   8.80*   MONOCYTES  5.50   --   5.70   EOSINOPHILS  0.50   --   0.00   BASOPHILS  0.50   --   0.20     Reaction Time Initial-R 3.9   5.0 - 10.0 min Final   Clot Kinetics-K 1.5  1.0 - 3.0 min Final   Clot Angle-Angle 68.9  53.0 - 72.0 degrees Final   Maximum Clot Strength-MA 65.6  50.0 - 70.0 mm Final   Lysis 30 minutes-LY30 0.3  0.0 - 8.0 % Final   % Inhibition ADP 64.9  % Final   % Inhibition AA 25.4  % Final   TEG Algorithm Link Algorithm   Final          Transfusions  None   Imaging  None     ID:  Temp  24hr: 96.2 F   Tmax: 98.9 F   Labs    Recent Labs      04/10/19   1000  04/10/19   1419  04/11/19   0315   WBC  13.8*  15.8*  14.1*   RBC  4.06*  4.20*  3.64*    HEMOGLOBIN  12.6*  13.0*  11.4*   HEMATOCRIT  39.2*  41.0*  34.1*   MCV  96.6  97.6  93.7   MCH  31.0  31.0  31.3   RDW  58.1*  59.7*  56.5*   PLATELETCT  338  293  295   MPV  8.8*  9.0  9.1   NEUTSPOLYS  79.00*   --   84.90*   LYMPHOCYTES  14.00*   --   8.80*   MONOCYTES  5.50   --   5.70   EOSINOPHILS  0.50   --   0.00   BASOPHILS  0.50   --   0.20        Micro  None   ABX  None     ENDOCRINE:  Blood Sugar  Last glucose was 147.   Meds  Insulin lispro 1-6 units q6 hours.     MUSCULOSKELETAL:  Imaging  None   WB Status  Non-ambulatory     SKIN:  Exam  Large gangrenous wound noted on right heel. Sacrum red and non-blanching. Well healed dialysis fistula noted on arm.   Interventions  Heel dressings; mepilex placed at sacrum     ASSESSMENT/PLAN:    NEURO:   Traumatic brain injury (HCC)   Closed fracture of skull ([facility])     Patient was found on head CT to have intraparenchymal hemorrhages in two areas of the right frontal lobe. There is also bilateral acute subdural hematoma, and bilateral acute subarachnoid hemorrhage. Patient is also noted to have closed fracture of the skull.  -Will continue to monitor; q2 neuro checks  -Dr. Dsouza from neurosurgery consulted, recommends no interventions at this time  - Keppra 500mg in NS 100ml 2 times daily for prophylaxis.    RESP:   Respiratory failure following trauma ([facility])     Patient is intubated. Patient shows no significant findings on CXR other than rib fracture. Most recent ABG within normal limits  - Continue ventilator    CV:  Patient demonstrates no acute cardiopulmonary process.  - Will continue to monitor    GI:   Patient demonstrates no acute GI processes  - Will continue to monitor  - Continue bowel regimen.    FEN-:   Dialysis patient ([facility])   History of suprapubic catheter  Chronic renal failure        Patient has no acute  findings. Suprapubic catheter in place, likely in place of mora catheter due to history of penile necrosis. Patient is on chronic  hemodialysis Tuesdays, Thursdays, and Saturdays.  - Continue to monitor urine output and electrolytes.  - Restart dialysis regimen  - Continue NS infusion.    HEME:   Patient slightly anemic given CBC. Hemoglobin at 11.4 today. Likely in part due to dilution from IV fluids.  - Will continue to monitor hemoglobin.  - Consider transfusion if hemoglobin drops below 8.0.    ID:  Patient leukocytotic. WBC today at 14.1 Neutrophils elevated at 84.9. Patient has a decreased 24 hr temperature at 96.2 F.  - Monitor for sepsis  - Obtain blood cultures.    ENDOCRINE:   Patient has a history of DM Type II. Patient is receiving Insulin lispro 1-6 units q6 hours. Last blood glucose was elevated at 147.  - Continue to monitor blood glucose, adjust insulin regimen appropriately.    MUSCULOSKELETAL:   Patient demonstrated rib fracture on CXR.  - Non-operative; pain control with morphine    SKIN:   Large black wound on right heel;Sacrum red and non blanching  - Continue to monitor both  - Regular wound dressing changes.    PROPHYLAXIS:  DVT Chemical DVT prophylaxis contraindicated; SCD in place   GI Bowel regimen noted above.   Restraints None     LINES/TUBES/CATHETERS:  Suprapubic catheter; day 2  Peripheral IV, antecubital; day 2  Peripheral IV, Forearm; day 2  Airway Intubation; day 2  Enteral tube; day 1

## 2019-04-11 NOTE — DISCHARGE PLANNING
Spoke to [employer] dispatch.They did send an officer to brother's home , unable to make contact. Left note on door.

## 2019-04-11 NOTE — PROGRESS NOTES
2 RN skin assessment completed  - bilateral feet red and non blanching  - large black wound noted to right heel dressing applied and elevated awaiting wound consult.   -scraps noted to bilateral legs.   -sacrum red and non blanching mepilex in place.   -POA super pubic catheter noted CDI  -Posterior head boggy, waffle pillow applied

## 2019-04-11 NOTE — DIETARY
"Nutrition Support Assessment     Nutrition services:   Day 1 of admit.  62 yo male with admitting diagnosis: GLF    Current problem list:  1. Closed skull fracture  2. Traumatic brain injury  3. Respiratory failure  4. History of ESRD on HD    Assessment:  Estimated Nutritional Needs: based on: height 5'11\", weight 68.4 kg, IBW 78.02 kg, BMI 21.03    Calculation/Equation MSJ x 1.2 = 1797 kcals  Calories/day: 1800 - 2000 kcals (26 - 29 kcals/kg)  Protein/day: 103 - 136 g (1.5 - 2.0 g/kg)    Evaluation:   1. Pt on vent in need of nutrition support  2. cortrak placed for enteral access - await confirmation of placement  3. Propofol now off  4. Increased nutrition needs with severe TBI to be met with TF at goal     Malnutrition Risk: na    Recommendations/Plan:  1. When confirmation of feeding tube placement is received start and advance TF per protocol  2. Impact 1.5 full goal 55 ml/hr will provide 1980 kcals, 124 g protein, 1019 ml H20/day    "

## 2019-04-11 NOTE — DISCHARGE PLANNING
Still unable to reach brother at , [facility] that [facility] is [facility] contact [facility].   Pt is a hemodialysis pt at [facility]. Nephrologist is Dr. Vazquez and PCP is Dr. Randal Osei, nurse is Osiel at [phone].  Left message with primary care team requesting call back.

## 2019-04-11 NOTE — WOUND TEAM
[facility]  Inpatient Services  Initial Wound and Skin Care Evaluation    Admission Date: 4/10/2019     Consult Date: 04/11/2019  HPI, PMH, SH: Reviewed    Unit where seen by Wound Team: [id number]     WOUND CONSULT RELATED TO:  Back, sacrum, right heel     SUBJECTIVE:  Intubated    Self Report / Pain Level:  No indications of pain.       OBJECTIVE:  , demonstrating seizure like activity, reached for ET tube when restraints unsecured for turning.        WOUND TYPE, LOCATION, CHARACTERISTICS (Pressure Injuries: location, stage, POA or date identified)       Pressure Injury 04/10/19 Heel RIGHT (Active)   Pressure Injury Stage U    State of Healing Non-healing    Site Assessment Black    Tyra-wound Assessment Other (Comment)    Margins Defined edges;Unattached edges    Wound Length (cm) 3.2 cm    Wound Width (cm) 4 cm    Wound Surface Area (cm^2) 12.8 cm^2    Closure Open to air    Drainage Amount None    Treatments Other (Comment)    Cleansing Not Applicable    Periwound Protectant Not Applicable    Dressing Options Open to Air    Dressing Cleansing/Solutions Not Applicable    NEXT Weekly Photo (Inpatient Only) 04/17/19    WOUND NURSE ONLY - Odor None    WOUND NURSE ONLY - Pulses Right;1+;DP;PT    WOUND NURSE ONLY - Exposed Structures None    WOUND NURSE ONLY - Tissue Type and Percentage 100% stable black eschar        Pressure Injury 04/10/19 Sacrum (Active)   Pressure Injury Stage 1    State of Healing Non-healing    Site Assessment Dusky;Red    Tyra-wound Assessment Blanchable erythema    Margins Undefined edges;Attached edges    Wound Length (cm) 4 cm    Wound Width (cm) 5 cm    Wound Depth (cm) 0 cm    Wound Surface Area (cm^2) 20 cm^2    Tunneling 0 cm    Undermining 0 cm    Treatments Site care    Cleansing Not Applicable    Periwound Protectant Not Applicable    Dressing Options Mepilex    Dressing Cleansing/Solutions Not Applicable    Dressing Changed Other (Comment)    Dressing Status  Clean;Dry;Intact    Dressing Change Frequency Every 72 hrs    NEXT Dressing Change  04/13/19    NEXT Weekly Photo (Inpatient Only) 04/17/19    WOUND NURSE ONLY - Odor None    WOUND NURSE ONLY - Exposed Structures None    WOUND NURSE ONLY - Tissue Type and Percentage 100% dark red       Vascular:    Dorsal Pedal pulses:  1+ palpable  Posterior tib pulses:   1+ palpable    MARILEE:      NA    Lab Values:    WBC:       WBC   Date/Time Value Ref Range Status   04/11/2019 03:15 AM 14.1 (H) 4.8 - 10.8 K/uL Final     A1C:    No results found for: HBA1C        Culture:   Obtained NA no signs of infection, Results NA    INTERVENTIONS BY WOUND TEAM:  [employer] nursing student assisted in turning patient. Upper back has no wounds. Stage 1 to sacrum. Right heel with stable eschar, left open to air, nursing to paint with betadine daily. NG tube leaked onto bed sheets, changed bed linens under patient, repositioned with pillows under left hip.    Dressing selection:  Sacral mepilex to sacral wound         Interdisciplinary consultation: Patient, Bedside RN, [employer] nursing student    EVALUATION: Sacral mepilex redistributes the forces of friction and shear. Betadine to keep eschar dry, also provides topical antimicrobial treatment    Factors affecting wound healing: ESRD, TBI, pressure.  Goals: Steady decrease in wound area and depth weekly.    NURSING PLAN OF CARE ORDERS (X):    Dressing changes: See Dressing Care orders: X  Skin care: See Skin Care orders: X  Rectal tube care: See Rectal Tube Care orders:   Other orders:    RSKIN: CURRENT (X) ORDERED (O):   Q shift Alireza:  X  Q shift pressure point assessments:  X  Pressure redistribution mattress            DEB     X ICU     Bariatric DEB         Bariatric foam           Heel float boots          Float Heels off Bed with Pillows    X           Barrier wipes         Barrier Cream         Barrier paste          Sacral silicone dressing     X    Silicone O2 tubing         Anchorfast          Cannula fixation Device (Tender )          Gray Foam Ear protectors           Trach with Optifoam split foam                 Waffle cushion        Waffle Overlay         Rectal tube or BMS         Antifungal tx      Interdry          Reposition q 2 hours      X  Up to chair        Ambulate      PT/OT        Dietician        Diabetes Education      PO     TF     TPN     NPO   X 1 # days   Other        WOUND TEAM PLAN OF CARE (X):   NPWT change 3 x week:        Dressing changes by wound team:       Follow up as needed:  X     Other (explain):     Anticipated discharge plans (X):   SNF:    X       Home Care:           Outpatient Wound Center:            Self Care:            Other:

## 2019-04-11 NOTE — PROGRESS NOTES
Neurosurgery at bedside, updated on neuro changes that occurred overnight. Propofol has been off for an hour and patient still not following, opening eyes and now seems to be moving extremities less spontaneously.     PELON Byrne called Dr. Dsouza to update him, no new orders received. Will continue to monitor.

## 2019-04-11 NOTE — CARE PLAN
Problem: Ventilation Defect:  Goal: Ability to achieve and maintain unassisted ventilation or tolerate decreased levels of ventilator support  Adult Ventilation Update    Total Vent Days: 2    Patient Lines/Drains/Airways Status    Active Airway     Name: Placement date: Placement time: Site: Days:    Airway ETT 8.0 04/10/19   1409      less than 1                 Plateau Pressure (Q Shift): 14 (04/10/19 1810)  Static Compliance (ml / cm H2O): 70.1 (04/11/19 0210)    Mobility  Level of Mobility: Level I (04/10/19 2000)  Activity Performed: Unable to mobilize (04/10/19 1600)  Reason Not Mobilized: Bed rest;Unstable condition (04/10/19 2000)    Events/Summary/Plan: Patient continues on ventilation throughout the night. patient tolerates thi well. Will continue to monitor and assist as needed.

## 2019-04-11 NOTE — PROGRESS NOTES
Neurosurgery Progress Note    Subjective:  63 year old male admitted yesterday with IPH in right inferior frontal lobe, right temporal lobe with small SDH bilaterally.  Has been intubated.  Was doing very minimal following.   Overnight stopped following.    Exam:  No opening of eyes. Pupils are equal on manual opening.  Will not follow, but withdrawal of all 4 ext and grimace to painful stim.  CT is stable.    BP  Min: 109/77  Max: 130/86  Pulse  Av.8  Min: 87  Max: 111  Resp  Av.7  Min: 16  Max: 25  Temp  Av.4 °C (97.5 °F)  Min: 35.7 °C (96.2 °F)  Max: 36.9 °C (98.4 °F)  SpO2  Av.9 %  Min: 91 %  Max: 100 %    No Data Recorded    Recent Labs      04/10/19   1000  04/10/19   1419  19   0315   WBC  13.8*  15.8*  14.1*   RBC  4.06*  4.20*  3.64*   HEMOGLOBIN  12.6*  13.0*  11.4*   HEMATOCRIT  39.2*  41.0*  34.1*   MCV  96.6  97.6  93.7   MCH  31.0  31.0  31.3   MCHC  32.1*  31.7*  33.4*   RDW  58.1*  59.7*  56.5*   PLATELETCT  338  293  295   MPV  8.8*  9.0  9.1     Recent Labs      04/10/19   1123  04/10/19   1419  19   0315   SODIUM  136  134*  137   POTASSIUM  3.7  4.0  3.4*   CHLORIDE  93*  94*  98   CO2  29  25  24   GLUCOSE  180*  178*  151*   BUN  17  18  20   CREATININE  2.92*  3.01*  2.96*   CALCIUM  8.8  9.0  8.0*         Recent Labs      04/10/19   141   REACTMIN  3.9*   CLOTKINET  1.5   CLOTANGL  68.9   MAXCLOTS  65.6   LNC64KIY  0.3   PRCINADP  64.9   PRCINAA  25.4       Intake/Output       04/10/19 0700 - 19 0659 19 0700 - 19 0659       Total [id number] [id number] Total       Intake    P.O.  --  0 0  --  -- --    P.O. -- 0 0 -- -- --    I.V.  100  1441 1541  --  -- --    Pre-Hospital Volume 0 -- 0 -- -- --    Trauma Resuscitation Volume 100 -- 100 -- -- --    Propofol Volume -- 241 241 -- -- --    Volume (mL) (NS infusion) -- 1200 1200 -- -- --    Blood  0  -- 0  --  -- --    PRBC Total Volume (Non-Barcoded) 0 -- 0 -- -- --    FFP Total  Volume (Non-Barcoded) 0 -- 0 -- -- --    Platelets Total Volume (Non-Barcoded) 0 -- 0 -- -- --    Cryoprecipitate (Pooled) Total Volume (Non-Barcoded) 0 -- 0 -- -- --    Total Intake 100 1441 1541 -- -- --       Output    Urine  30  310 340  --  -- --    Output (mL) (Suprapubic Catheter Jacob) 30 310 340 -- -- --    Other  0  -- 0  --  -- --    Pre-Hospital Output 0 -- 0 -- -- --    Trauma Resuscitation Output 0 -- 0 -- -- --    Blood  0  -- 0  --  -- --    Est. Blood Loss 0 -- 0 -- -- --    Total Output 30 310 340 -- -- --       Net I/O     70 1131 1201 -- -- --            Intake/Output Summary (Last 24 hours) at 04/11/19 0827  Last data filed at 04/11/19 0600   Gross per 24 hour   Intake          1540.95 ml   Output              340 ml   Net          1200.95 ml            • Respiratory Care per Protocol   Continuous RT   • Pharmacy Consult Request  1 Each PHARMACY TO DOSE   • docusate sodium  100 mg BID   • senna-docusate  1 Tab Nightly   • senna-docusate  1 Tab Q24HRS PRN   • polyethylene glycol/lytes  1 Packet BID   • magnesium hydroxide  30 mL DAILY   • bisacodyl  10 mg Q24HRS PRN   • fleet  1 Each Once PRN   • NS   Continuous   • morphine injection  2 mg Q HOUR PRN   • ondansetron  4 mg Q4HRS PRN   • famotidine  20 mg DAILY    Or   • famotidine  20 mg DAILY   • levETIRAcetam (KEPPRA) IV  500 mg BID   • insulin lispro  1-6 Units Q6HRS    And   • dextrose 50%  25 mL Q15 MIN PRN   • propofol  0-80 mcg/kg/min Continuous       Assessment and Plan:  IPH in right inferior frontal lobe, right temporal lobe, with bilateral SDH.  Continue q 2 hour neuro checks.  Will follow.

## 2019-04-11 NOTE — CARE PLAN
Problem: Skin Integrity  Goal: Risk for impaired skin integrity will decrease  Q 2 hour turning in place, pillows in use for support and positioning.

## 2019-04-11 NOTE — CONSULTS
DATE OF SERVICE:  04/11/2019    REQUESTING PHYSICIAN:  Dr. Schreiber.    CONSULTING PHYSICIAN:  Constantin Birmingham MD    REASON FOR CONSULTATION:  Patient with end-stage renal disease status post   traumatic brain hemorrhage.    HISTORY OF PRESENT ILLNESS:  A 63-year-old male who has end-stage renal   disease and is on chronic hemodialysis on Tuesdays, Thursdays and Saturdays at   [facility].  The patient dialyzes for 4 hours.  [facility]  He was transferred to [facility] and had a CT of the brain that revealed mildly displaced left   frontal skull fracture and intraparenchymal hemorrhage with edema and   bilateral subdural hematomas.  The patient had worsening respiratory status   after the CT and required intubation and mechanical ventilation.  He is at the   intensive care unit.    PAST MEDICAL HISTORY:  SURGERIES:  1.  Placement of suprapubic catheter that needs periodic exchanges.  2.  Left upper extremity AV fistula.  3.  Placement and removal of PermCath.    MEDICAL ILLNESSES:  1.  End-stage renal disease, on chronic hemodialysis on Tuesdays, Thursdays   and Saturdays.  The etiology of his renal disease was diabetes mellitus.  2.  Diabetes mellitus.  3.  Hypertension.  Apparently, most recently, the patient has hypotensive and   has orthostasis and has been on midodrine.  4.  Anemia of chronic kidney disease.  The patient is iron repleted.  He has   received IV iron on dialysis previously.  5.  Chronic kidney disease metabolic bone disorder.  His most recent vitamin D   was 40.6 and PTH was 63.  It is not clear if patient is still on calcitriol.  6.  History of penile necrosis.  7.  Atrial fibrillation, previously on amiodarone.  8.  Anxiety.  9.  Dyslipidemia.  10.Chronic suprapubic catheter    ALLERGIES:  None known.    OUTPATIENT MEDICINES:  Renagel 1600 mg with meals.  I am not sure if he is   still on amiodarone,  calcitriol.    FAMILY HISTORY:  Unknown.  Unable to obtain.    SOCIAL HISTORY:  Unknown.  Unable to obtain.    REVIEW OF SYSTEMS:  Unable to obtain, patient is intubated and unresponsive.    PHYSICAL EXAMINATION:  GENERAL:  Patient is intubated on mechanical ventilation with no interaction.  VITAL SIGNS:  Blood pressure is 117/72.  SKIN:  With no generalized rash.  LYMPH NODES:  No cervical adenopathies.  HEENT:  Pupils are irregular.  He has an orotracheal and orogastric tube in   place.  NECK:  With no bruits or masses.  LUNGS:  Clear to percussion and auscultation.  No wheezes or crackles.  HEART:  Regular rhythm with III/VI systolic murmur at the base.  No   pericardial rub present.  ABDOMEN:  Bowel sounds present, soft.  No obvious organomegaly.  There is   suprapubic catheter in place.  EXTREMITIES:  With trace lower extremity edema.  There is a right heel ulcer.    There is a functioning left upper extremity AV fistula.    LABORATORY DATA:  Chest x-ray revealed no acute cardiopulmonary disease.    White count 14.1, hemoglobin 11.4, platelets 295,000.  Phosphorus 2.8,   magnesium is 1.8.  Bilirubin is 1.6.  Sodium 137, potassium 3.4, chloride 98,   CO2 is 24, BUN 20, creatinine 2.98, calcium 8.    IMPRESSION:  1.  End-stage renal disease, on chronic hemodialysis Tuesdays, Thursdays and   Saturdays.  2.  Status post traumatic intracerebral bleed with bilateral subdural   hematomas as well.  He has mildly displaced left frontal skull fracture.  3.  Ventilator-dependent respiratory failure.  4.  Diabetes mellitus.  5.  History of hypertension and also history of hypotension with orthostasis,   on midodrine.  6.  Anemia of chronic kidney disease, iron repleted.  7.  Chronic kidney disease -- Metabolic bone disorder, most recent vitamin D   is normal.  PTH has been suppressed.  He was on calcitriol in the past.    8.  History of penile necrosis.  9.  Status post suprapubic catheter.  10.  History of atrial  fibrillation, previously on amiodarone.    11.  History of anxiety.  12.  History of dyslipidemia.  13.  Functional left upper extremity AV fistula.  14.  History of placement and removal of PermCath.  15.  Hypoalbuminemia.  16.  Elevated bilirubin.  17.  Borderline hypokalemia, probably due to dialysis.    PLAN:  1.  We are going to hold off his dialysis today since the patient is   relatively hypotensive, does not have any fluid overload and his azotemia is   controlled.  2.  We are going to follow his labs.  3.  We will probably dialyze him tomorrow.  4.  He should be on no dietary protein restrictions.  5.  He does not need Epogen at this time.  6.  All of his medicines should be dosed for GFR less than 10.  7.  [facility].      Thanks for this consultation.  We will follow with you.       ____________________________________     MD KRYSTAL RODRIGEZ / DYLAN    DD:  04/11/2019 11:42:37  DT:  04/11/2019 13:19:48    D#:  [id number]  Job#:  [id number]

## 2019-04-11 NOTE — CARE PLAN
Problem: Safety  Goal: Will remain free from injury  Bed in low locked position room near nurses station. Soft right and left wrist restraints in use documented in flowsheets

## 2019-04-11 NOTE — PROGRESS NOTES
2250 paged Dr. Dsouza  [phone] Page returned. Updated Dr Dsouza that pt had not been following commands this shift but continued to move all 4 extremities spontaneous ans was purposeful with bilateral upper extremities. Also updated that repeat CT read stable. No new orders received from Dr. Dsouza.

## 2019-04-12 NOTE — NON-PROVIDER
Patient Summary:  Lito Coronado is a 64 yo male with ESRD on dialysis who [facility] presumably [facility] from a syncopal event. Patient was found on head CT to have intraparenchymal hemorrhages in two areas of the right frontal lobe and was subsequently intubated. There is also bilateral acute subdural hematoma, and bilateral acute subarachnoid hemorrhage.     24 Hour Events: See above; No acute overnight events     SUBJECTIVE/OBJECTIVE:  NEURO:  GCS 24hr: 4T  Current: 5T   Exam Unable to complete due to patient being unresponsive   Meds/Pain Keppra 500mg in NS 100ml 2 times daily;  Continuous propofol 0-80mcg/kg/min;   IV morphine 2mg q1 hr PRN   Labs Diagnostic alcohol 0.00 on admission   Imaging Repeat CT- Head w/o contrast shows stable bilateral frontal parenchymal hemorrhages consistent with hemorrhagic contusions, stable subarachnoid hemorrhage, stable bilateral subdural hemorrhages, and left frontal bone fractures.  CT- Cspine w/o contrast shows no acute fractures       RESP:  RR, SpO2  RR: 22, SpO2: 99%   Vent Mode: Adaptive support ventilation; rate: 22; FiO2: 30%, PEEP: 8.0   Exam Equal breath sounds bilaterally, no wheezes rales or rhonchi    Meds  None   Labs  None   Imaging CXR shows rib fracture, no other cardiopulmonary processes evident      CV:  HR/BP/MAP HR: average 109; BP: 110-120/70s; MAP:80s   Exam  Normal S1,S2; No rubs murmurs or gallops   Meds  None   Labs  Troponin: 0.60; BNP: >5000 on admission    Imaging  CXR: Left-sided rib fractures; EKG shows sinus tachycardia with borderline repolarization abnormality.      GI:  Diet/Bowel Function NPO  Famotidine 20mg daily  Milk of Magnesia 30ml daily  Miralax 1 packet two times daily  Senna-docusate 1 tab nightly.  Zofran 4mg q4 hours PRN  Simethicone 80mg chew tab four times daily  Fleet enema 133 mL 1 each once PRN   Exam Bowel sounds normal   Labs AST: 16; ALT <5; Alk phosphatase: 106; Total Protein 5.7;  Triglycerides 172   Imaging None      F/E/N-:  I/O  24hr totals:       Intake/Output Summary (Last 24 hours) at 04/12/19 1320  Last data filed at 04/12/19 1200   Gross per 24 hour   Intake          1959.39 ml   Output              375 ml   Net          1584.39 ml     Suprapubic catheter in place                        Exam Catheter in place with no signs of erythema or infeciton; urine color normal and nonbloody.   Meds D5 25ml q15 min   Labs 4/12/19  Recent Labs      04/10/19   1419  04/11/19   0315  04/12/19   0307   SODIUM  134*  137  137   POTASSIUM  4.0  3.4*  3.6   CHLORIDE  94*  98  98   CO2  25  24  22   GLUCOSE  178*  151*  169*   BUN  18  20  26*     Calcium: 8.5  Magnesium: 1.8  Phosphorus: 3.8  Cr: 3.59   Nutrition Enteral feeds;  Calculation/Equation MSJ x 1.2 = 1797 kcals  Calories/day: 1800 - 2000 kcals (26 - 29 kcals/kg)  Protein/day: 103 - 136 g (1.5 - 2.0 g/kg) 3802-5230 lou/day; goals met today      HEME:  Labs  4/12/19  Recent Labs      04/10/19   1000  04/10/19   1419  04/11/19   0315  04/12/19   0307   WBC  13.8*  15.8*  14.1*  18.2*   RBC  4.06*  4.20*  3.64*  3.92*   HEMOGLOBIN  12.6*  13.0*  11.4*  12.2*   HEMATOCRIT  39.2*  41.0*  34.1*  37.6*   MCV  96.6  97.6  93.7  95.9   MCH  31.0  31.0  31.3  31.1   RDW  58.1*  59.7*  56.5*  58.9*   PLATELETCT  338  293  295  308   MPV  8.8*  9.0  9.1  8.9*   NEUTSPOLYS  79.00*   --   84.90*  82.60*   LYMPHOCYTES  14.00*   --   8.80*  10.20*   MONOCYTES  5.50   --   5.70  6.50   EOSINOPHILS  0.50   --   0.00  0.00   BASOPHILS  0.50   --   0.20  0.20     4/10/19  Reaction Time Initial-R 3.9   5.0 - 10.0 min Final   Clot Kinetics-K 1.5  1.0 - 3.0 min Final   Clot Angle-Angle 68.9  53.0 - 72.0 degrees Final   Maximum Clot Strength-MA 65.6  50.0 - 70.0 mm Final   Lysis 30 minutes-LY30 0.3  0.0 - 8.0 % Final   % Inhibition ADP 64.9  % Final   % Inhibition AA 25.4  % Final   TEG Algorithm Link Algorithm    Final         Transfusions  None   Imaging  None       ID:  Temp  24hr: 98.27 F   Tmax: 99.1 F   Labs  4/12/19  Recent Labs      04/10/19   1000  04/10/19   1419  04/11/19   0315  04/12/19   0307   WBC  13.8*  15.8*  14.1*  18.2*   RBC  4.06*  4.20*  3.64*  3.92*   HEMOGLOBIN  12.6*  13.0*  11.4*  12.2*   HEMATOCRIT  39.2*  41.0*  34.1*  37.6*   MCV  96.6  97.6  93.7  95.9   MCH  31.0  31.0  31.3  31.1   RDW  58.1*  59.7*  56.5*  58.9*   PLATELETCT  338  293  295  308   MPV  8.8*  9.0  9.1  8.9*   NEUTSPOLYS  79.00*   --   84.90*  82.60*   LYMPHOCYTES  14.00*   --   8.80*  10.20*   MONOCYTES  5.50   --   5.70  6.50   EOSINOPHILS  0.50   --   0.00  0.00   BASOPHILS  0.50   --   0.20  0.20           Micro  None   ABX  None      ENDOCRINE:  Blood Sugar  Last glucose was 200.   Meds  Insulin lispro 1-6 units q6 hours.      MUSCULOSKELETAL:  Imaging  None   WB Status  Non-ambulatory      SKIN:  Exam  Large gangrenous wound noted on right heel. Sacrum red and non-blanching. Well healed dialysis fistula noted on arm.   Interventions  Heel dressings; mepilex placed at sacrum      ASSESSMENT/PLAN:     NEURO:   Traumatic brain injury (HCC)   Closed fracture of skull (HCC)      Patient was found on head CT to have intraparenchymal hemorrhages in two areas of the right frontal lobe. There is also bilateral acute subdural hematoma, and bilateral acute subarachnoid hemorrhage. Patient is also noted to have closed fracture of the skull.  - Will continue to monitor; q2 neuro checks  - Dr. Dsouza from neurosurgery consulted, recommends no interventions at this time.  - Neurosurgery recommends starting lovenox tomorrow (4/13)  - Keppra 500mg in NS 100ml 2 times daily for prophylaxis.     RESP:   Respiratory failure following trauma (HCC)      Patient is intubated. Patient shows no significant findings on CXR other than rib fracture. Most recent ABG within normal limits  - Continue ventilator     CV:  Patient demonstrates no acute cardiopulmonary process.  - Will continue to  monitor     GI:   Patient demonstrates no acute GI processes.  - Will continue to monitor  - Continue bowel regimen.     FEN-:   Dialysis patient (HCC)   History of suprapubic catheter  Chronic renal failure         Patient has no acute  findings. Suprapubic catheter in place, likely in place of mora catheter due to history of penile necrosis. Patient is on chronic hemodialysis Tuesdays, Thursdays, and Saturdays. Creatinine at 3.59. BUN elevated at 26.  - Patient scheduled for dialysis today.  - Continue to monitor urine output and electrolytes.  - Continue NS infusion.     HEME:   Patient slightly anemic given CBC. Hemoglobin at 12.2 today. Likely in part due to dilution from IV fluids.  - Will continue to monitor hemoglobin.  - Consider transfusion if hemoglobin drops below 8.0.     ID:  Patient leukocytotic. WBC today at 18.2. Leukocytosis likely due to gangrenous lesion of the right heel.  - Monitor for sepsis  - Obtain blood cultures.     ENDOCRINE:   Patient has a history of DM Type II. Patient is receiving Insulin lispro 1-6 units q6 hours. Last blood glucose was elevated at 200.  - Continue to monitor blood glucose, adjust insulin regimen appropriately.     MUSCULOSKELETAL:   Patient demonstrated rib fracture on CXR.  - Non-operative; pain control with morphine     SKIN:   Large black wound on right heel; Sacrum red and non blanching  - Continue to monitor both  - Regular wound dressing changes.     PROPHYLAXIS:  DVT SCD in place; plan to start lovenox tomorrow per neurosurgery   GI Bowel regimen noted above.   Restraints None      LINES/TUBES/CATHETERS:  Suprapubic catheter; day 3  Peripheral IV, antecubital; day 3  Peripheral IV, Forearm; day 3  Airway Intubation; day 3  Enteral tube; day 2

## 2019-04-12 NOTE — CARE PLAN
Problem: Nutritional:  Goal: Nutrition support tolerated and meeting greater than 85% of estimated needs  Outcome: MET Date Met: 04/12/19

## 2019-04-12 NOTE — PROGRESS NOTES
2 RN skin assessment completed  - bilateral feet red and non blanching  - large black wound noted to right heel open to air per wound RN reccomendation   -scraps ans pigmented areas noted to bilateral legs.   -sacrum red and non blanching mepilex in place.   -POA super pubic catheter noted CDI  -Posterior head red/purple area newly noted. Photo updated in EPIC and wound consult placed.

## 2019-04-12 NOTE — CARE PLAN
Problem: Ventilation Defect:  Goal: Ability to achieve and maintain unassisted ventilation or tolerate decreased levels of ventilator support  Adult Ventilation Update    Total Vent Days: 3    Patient Lines/Drains/Airways Status    Active Airway     Name: Placement date: Placement time: Site: Days:    Airway ETT 8.0 04/10/19   1409      less than 1                 Plateau Pressure (Q Shift): 14 (04/10/19 1810)  Static Compliance (ml / cm H2O): 70.1 (04/11/19 0210)    Mobility  Level of Mobility: Level I (04/10/19 2000)  Activity Performed: Unable to mobilize (04/10/19 1600)  Reason Not Mobilized: Bed rest;Unstable condition (04/10/19 2000)    Events/Summary/Plan: Patient continues on ventilation throughout the night. patient tolerates thi well. Will continue to monitor and assist as needed.

## 2019-04-12 NOTE — PROGRESS NOTES
2 RN skin check completed. No new lincoln of concern. Appropriate barriers in place. Wound care following.

## 2019-04-12 NOTE — CARE PLAN
Problem: Ventilation Defect:  Goal: Ability to achieve and maintain unassisted ventilation or tolerate decreased levels of ventilator support  Outcome: PROGRESSING AS EXPECTED     Ventilator Daily Summary    Vent Day # 3      Weaning trials: weaned today pt continued to go apneic. Continue weaning as tolerated      Plan: Continue current ventilator settings and wean mechanical ventilation as tolerated per physician orders.

## 2019-04-12 NOTE — PROGRESS NOTES
Neurosurgery Progress Note    Subjective:  63 year old male admitted with IPH in right inferior frontal lobe, right temporal lobe with small SDH bilaterally.  Has been intubated.  Not following.     Exam:  No opening of eyes. Pupils are equal on manual opening.  Will not follow, but withdrawal of all 4 ext and grimace to painful stim.  CT is stable.    Pulse  Av.9  Min: 104  Max: 148  Resp  Av.1  Min: 20  Max: 23  Temp  Av.8 °C (98.3 °F)  Min: 36.6 °C (97.9 °F)  Max: 37.2 °C (98.9 °F)  SpO2  Av.5 %  Min: 96 %  Max: 100 %    No Data Recorded    Recent Labs      04/10/19   1419  04/11/19   0315  04/12/19   030   WBC  15.8*  14.1*  18.2*   RBC  4.20*  3.64*  3.92*   HEMOGLOBIN  13.0*  11.4*  12.2*   HEMATOCRIT  41.0*  34.1*  37.6*   MCV  97.6  93.7  95.9   MCH  31.0  31.3  31.1   MCHC  31.7*  33.4*  32.4*   RDW  59.7*  56.5*  58.9*   PLATELETCT  293  295  308   MPV  9.0  9.1  8.9*     Recent Labs      04/10/19   14119   0307   SODIUM  134*  137  137   POTASSIUM  4.0  3.4*  3.6   CHLORIDE  94*  98  98   CO2  25  24  22   GLUCOSE  178*  151*  169*   BUN  18  20  26*   CREATININE  3.01*  2.96*  3.59*   CALCIUM  9.0  8.0*  8.5         Recent Labs      04/10/19   1419   REACTMIN  3.9*   CLOTKINET  1.5   CLOTANGL  68.9   MAXCLOTS  65.6   [id number]  0.3   PRCINADP  64.9   PRCINAA  25.4       Intake/Output       19 0700 - 19 0659 19 07 - 19 0659      3228-4696 5576-7817 Total 1900-0659 Total       Intake    P.O.  --  0 0  --  -- --    P.O. -- 0 0 -- -- --    I.V.  775.3  510.8 1286.1  --  -- --    Propofol Volume 28.6 10.8 39.4 -- -- --    Volume (mL) (NS infusion) 746.7 500 1246.7 -- -- --    NG/GT  --  200 200  --  -- --    Intake (mL) (Enteral Tube 19 Dobhoff - Gastric 10 Fr. Right nare) -- 200 200 -- -- --    IV Piggyback  200  -- 200  --  -- --    Volume (mL) (levETIRAcetam (KEPPRA) 500 mg in  mL IVPB) 200 -- 200 -- -- --     Total Intake 975.3 710.8 1686.1 -- -- --       Output    Urine  130  210 340  --  -- --    Output (mL) (Suprapubic Catheter Jacob) 130 210 340 -- -- --    Total Output 130 210 340 -- -- --       Net I/O     845.3 500.8 1346.1 -- -- --            Intake/Output Summary (Last 24 hours) at 04/12/19 0805  Last data filed at 04/12/19 0600   Gross per 24 hour   Intake          1686.06 ml   Output              310 ml   Net          1376.06 ml            • docusate sodium 100mg/10mL  100 mg BID   • Pharmacy  1 Each PHARMACY TO DOSE   • Respiratory Care per Protocol   Continuous RT   • Pharmacy Consult Request  1 Each PHARMACY TO DOSE   • senna-docusate  1 Tab Nightly   • senna-docusate  1 Tab Q24HRS PRN   • polyethylene glycol/lytes  1 Packet BID   • magnesium hydroxide  30 mL DAILY   • bisacodyl  10 mg Q24HRS PRN   • fleet  1 Each Once PRN   • NS   Continuous   • morphine injection  2 mg Q HOUR PRN   • ondansetron  4 mg Q4HRS PRN   • famotidine  20 mg DAILY    Or   • famotidine  20 mg DAILY   • levETIRAcetam (KEPPRA) IV  500 mg BID   • insulin lispro  1-6 Units Q6HRS    And   • dextrose 50%  25 mL Q15 MIN PRN   • propofol  0-80 mcg/kg/min Continuous       Assessment and Plan:  IPH in right inferior frontal lobe, right temporal lobe, with bilateral SDH.  Continue q 2 hour neuro checks.  Ok to start anticoagulation on 4/13 if needed.   Will follow.

## 2019-04-12 NOTE — PROGRESS NOTES
Trauma / Surgical Daily Progress Note    Date of Service  4/12/2019    Chief Complaint  63 y.o. male admitted 4/10/2019 with Trauma    Interval Events  63 year old ESRD patient with severe traumatic brain injury. Non operative management. [facility]. Probably had afib in the past with coumadin and amiodarone but does not appear to be on either medication now and is not in afib.  Nephrology consult obtained.    Review of Systems  Review of Systems       Vital Signs for last 24 hours  Temp:  [36.6 °C (97.9 °F)-37.3 °C (99.1 °F)] 37.1 °C (98.7 °F)  Pulse:  [104-119] 108  Resp:  [22-23] 22  SpO2:  [99 %-100 %] 100 %    Hemodynamic parameters for last 24 hours       Respiratory Data     Respiration: (!) 22, Pulse Oximetry: 100 %     Work Of Breathing / Effort: Vented  RUL Breath Sounds: Clear, RML Breath Sounds: Clear, RLL Breath Sounds: Diminished, ADRIEL Breath Sounds: Clear, LLL Breath Sounds: Diminished    Physical Exam  Physical Exam   Constitutional:   Intubated, NAD   HENT:   Head: Normocephalic and atraumatic.   Eyes:   Pupils 3mm and sluggish   Neck: Normal range of motion. No tracheal deviation present. No thyromegaly present.   Cardiovascular: Normal rate and regular rhythm.    Pulmonary/Chest: Effort normal and breath sounds normal.   Abdominal: Soft.   Suprapubic tube in place   Genitourinary: Penis normal.   Musculoskeletal: Normal range of motion.   Left medial upper arm AV fistula with thrill   Neurological:   Furrows brown and withdrawals all extremities to painful stimuli   Skin: Skin is warm and dry.   Psychiatric:   Unable to assess     Nursing note and vitals reviewed.      Laboratory  Recent Results (from the past 24 hour(s))   ACCU-CHEK GLUCOSE    Collection Time: 04/11/19  5:54 PM   Result Value Ref Range    Glucose - Accu-Ck 149 (H) 65 - 99 mg/dL   ACCU-CHEK GLUCOSE    Collection Time: 04/12/19 12:46 AM   Result Value Ref Range     Glucose - Accu-Ck 155 (H) 65 - 99 mg/dL   Triglycerides Starting now and then Every 3 Days    Collection Time: 04/12/19  3:07 AM   Result Value Ref Range    Triglycerides 172 (H) 0 - 149 mg/dL   PHOSPHORUS    Collection Time: 04/12/19  3:07 AM   Result Value Ref Range    Phosphorus 3.8 2.5 - 4.5 mg/dL   MAGNESIUM    Collection Time: 04/12/19  3:07 AM   Result Value Ref Range    Magnesium 1.8 1.5 - 2.5 mg/dL   Comp Metabolic Panel    Collection Time: 04/12/19  3:07 AM   Result Value Ref Range    Sodium 137 135 - 145 mmol/L    Potassium 3.6 3.6 - 5.5 mmol/L    Chloride 98 96 - 112 mmol/L    Co2 22 20 - 33 mmol/L    Anion Gap 17.0 (H) 0.0 - 11.9    Glucose 169 (H) 65 - 99 mg/dL    Bun 26 (H) 8 - 22 mg/dL    Creatinine 3.59 (H) 0.50 - 1.40 mg/dL    Calcium 8.5 8.5 - 10.5 mg/dL    AST(SGOT) 16 12 - 45 U/L    ALT(SGPT) <5 2 - 50 U/L    Alkaline Phosphatase 113 (H) 30 - 99 U/L    Total Bilirubin 1.8 (H) 0.1 - 1.5 mg/dL    Albumin 2.9 (L) 3.2 - 4.9 g/dL    Total Protein 6.4 6.0 - 8.2 g/dL    Globulin 3.5 1.9 - 3.5 g/dL    A-G Ratio 0.8 g/dL   CBC WITH DIFFERENTIAL    Collection Time: 04/12/19  3:07 AM   Result Value Ref Range    WBC 18.2 (H) 4.8 - 10.8 K/uL    RBC 3.92 (L) 4.70 - 6.10 M/uL    Hemoglobin 12.2 (L) 14.0 - 18.0 g/dL    Hematocrit 37.6 (L) 42.0 - 52.0 %    MCV 95.9 81.4 - 97.8 fL    MCH 31.1 27.0 - 33.0 pg    MCHC 32.4 (L) 33.7 - 35.3 g/dL    RDW 58.9 (H) 35.9 - 50.0 fL    Platelet Count 308 164 - 446 K/uL    MPV 8.9 (L) 9.0 - 12.9 fL    Neutrophils-Polys 82.60 (H) 44.00 - 72.00 %    Lymphocytes 10.20 (L) 22.00 - 41.00 %    Monocytes 6.50 0.00 - 13.40 %    Eosinophils 0.00 0.00 - 6.90 %    Basophils 0.20 0.00 - 1.80 %    Immature Granulocytes 0.50 0.00 - 0.90 %    Nucleated RBC 0.00 /100 WBC    Neutrophils (Absolute) 15.02 (H) 1.82 - 7.42 K/uL    Lymphs (Absolute) 1.86 1.00 - 4.80 K/uL    Monos (Absolute) 1.19 (H) 0.00 - 0.85 K/uL    Eos (Absolute) 0.00 0.00 - 0.51 K/uL    Baso (Absolute) 0.04 0.00 - 0.12 K/uL     Immature Granulocytes (abs) 0.09 0.00 - 0.11 K/uL    NRBC (Absolute) 0.00 K/uL   CRP Quantitive (Non-Cardiac)    Collection Time: 04/12/19  3:07 AM   Result Value Ref Range    Stat C-Reactive Protein 14.17 (H) 0.00 - 0.75 mg/dL   Prealbumin    Collection Time: 04/12/19  3:07 AM   Result Value Ref Range    Pre-Albumin 8.0 (L) 18.0 - 38.0 mg/dL   ESTIMATED GFR    Collection Time: 04/12/19  3:07 AM   Result Value Ref Range    GFR If  21 (A) >60 mL/min/1.73 m 2    GFR If Non African American 17 (A) >60 mL/min/1.73 m 2   ACCU-CHEK GLUCOSE    Collection Time: 04/12/19  5:39 AM   Result Value Ref Range    Glucose - Accu-Ck 172 (H) 65 - 99 mg/dL   ACCU-CHEK GLUCOSE    Collection Time: 04/12/19 11:47 AM   Result Value Ref Range    Glucose - Accu-Ck 200 (H) 65 - 99 mg/dL   HEPATITIS PANEL ACUTE(4 COMPONENTS)    Collection Time: 04/12/19 12:15 PM   Result Value Ref Range    Hepatitis B Surface Antigen Negative Negative    Hepatitis B Cors Ab,IgM Negative Negative    Hepatitis A Virus Ab, IgM Negative Negative    Hepatitis C Antibody Negative Negative       Fluids    Intake/Output Summary (Last 24 hours) at 04/12/19 1444  Last data filed at 04/12/19 1200   Gross per 24 hour   Intake          1859.39 ml   Output              375 ml   Net          1484.39 ml       Core Measures & Quality Metrics  Labs reviewed, Medications reviewed and Radiology images reviewed  Jacob catheter: Critically Ill - Requiring Accurate Measurement of Urinary Output  Central line in place: Need for access    DVT Prophylaxis: Contraindicated - High bleeding risk  DVT prophylaxis - mechanical: SCDs  Ulcer prophylaxis: Yes        FLORECITA Score    ETOH Screening      Assessment/Plan  Closed fracture of skull (HCC)- (present on admission)   Assessment & Plan    Acute mildly displaced left frontal bone skull fracture.  Non-operative management.  Joshua Dsouza MD. Neurosurgery.     Traumatic brain injury (HCC)- (present on admission)    Assessment & Plan    2 adjacent areas of intraparenchymal hemorrhage involving the right frontal lobe with some surrounding vasogenic edema  Bilateral acute subdural hematoma, right greater than left with a large amount of blood in the right middle cranial fossa.  Bilateral acute subarachnoid hemorrhage.  There is right-sided hemispheric edema with 5 mm of right-to-left midline shift.   Anti-seizure prophylaxis, Keppra, x 7 days  4/10 Exam improved to 11T.  Operative therapy and ICP deferred. Serial CT .     Non-operative management.  Joshua Dsouza MD. Neurosurgery.     Respiratory failure following trauma (HCC)- (present on admission)   Assessment & Plan    Intubated in trauma bay  Continue full mechanical ventilatory support. Ventilator bundle and Trauma weaning protocol.     End stage renal disease (HCC)   Assessment & Plan    Chronic dialysis patient, [facility]  4/11 - nephrology consult, plan dialysis 4/12  Dr. [place], nephrology     History of suprapubic catheter- (present on admission)   Assessment & Plan    Present prior to arrival      Contraindication to deep vein thrombosis (DVT) prophylaxis- (present on admission)   Assessment & Plan    Systemic anticoagulation contraindicated secondary to elevated bleeding risk.  4/12 Surveillance venous duplex scanning ordered.     Trauma- (present on admission)   Assessment & Plan    [facility] a suspect ground level fall, transported to [facility].  Initial non-trauma activation. Upgrade to trauma red after CT imaging demonstrated a traumatic brain injury and skull fracture  Dawit Schreiber MD. Trauma Surgery.       Plan:  Non operative management of severe TBI - follow exam.  Appreciate nephrology consult - plan HD today  Enteral nutritional support  Duplex screening today  Still no next of kin or POA. May require ethics consult if no POA can be located.      Discussed patient condition with RN, RT and Pharmacy.   The patient  is/remains critically ill with severe traumatic brain injury, renal failure, respiratory failure.    I provided the following critical care services: ventilator management and weaning, high risk medication management, resuscitation, bedside communication with consulting physicians.    Critical care time spent exclusive of procedures: 78 minutes.    Jose Marcos MD  [phone]

## 2019-04-12 NOTE — PROGRESS NOTES
[facility] Nephrology Daily Progress Note    Date of Service  4/12/2019    Chief Complaint  63-year-old male who has end-stage renal   disease and is on chronic hemodialysis on Tuesdays, Thursdays and Saturdays at   [facility].  The patient dialyzes for 4 hours.  The [facility].  He was transferred to [facility] and had a CT of the brain that revealed mildly displaced left   frontal skull fracture and intraparenchymal hemorrhage with edema and   bilateral subdural hematomas.  The patient had worsening respiratory status   after the CT and required intubation and mechanical ventilation.      Interval Problem Update  04/11/19 - Consult done.no HD today.  04/12/19 - No new events.Ventilated. mL.    Review of Systems  Review of Systems   Unable to perform ROS: Intubated        Physical Exam  Temp:  [36.6 °C (97.9 °F)-37.2 °C (98.9 °F)] 37.1 °C (98.8 °F)  Pulse:  [104-148] 104  Resp:  [20-23] 22  SpO2:  [96 %-100 %] 100 %    Physical Exam   Constitutional: He appears well-developed and well-nourished.   Ventilated   HENT:   Right Ear: External ear normal.   Left Ear: External ear normal.   Skull Fx  JOANIE  Nasal feeding tube   Eyes: Conjunctivae are normal. Right eye exhibits no discharge. Left eye exhibits no discharge.   Neck: Normal range of motion. Neck supple. No thyromegaly present.   Cardiovascular: Normal rate and regular rhythm.  Exam reveals no friction rub.    Murmur heard.  Pulmonary/Chest: Breath sounds normal. He has no wheezes. He has no rales.   Ventilated   Abdominal: Soft. Bowel sounds are normal. He exhibits no distension and no mass. There is no tenderness. There is no guarding.   Genitourinary:   Genitourinary Comments: Suprapubic catheter   Musculoskeletal:   Trace edema LE's  LUE AVF  R heel ulcer   Lymphadenopathy:     He has cervical adenopathy.   Neurological:   No interaction   Skin: Skin is warm and dry. No rash  noted. No erythema.   Nursing note and vitals reviewed.      Fluids    Intake/Output Summary (Last 24 hours) at 04/12/19 0806  Last data filed at 04/12/19 0600   Gross per 24 hour   Intake          1686.06 ml   Output              310 ml   Net          1376.06 ml       Laboratory  Recent Labs      04/10/19   1419  04/11/19   0315  04/12/19   0307   WBC  15.8*  14.1*  18.2*   RBC  4.20*  3.64*  3.92*   HEMOGLOBIN  13.0*  11.4*  12.2*   HEMATOCRIT  41.0*  34.1*  37.6*   MCV  97.6  93.7  95.9   MCH  31.0  31.3  31.1   MCHC  31.7*  33.4*  32.4*   RDW  59.7*  56.5*  58.9*   PLATELETCT  293  295  308   MPV  9.0  9.1  8.9*     Recent Labs      04/10/19   1419  04/11/19   0315  04/12/19   0307   SODIUM  134*  137  137   POTASSIUM  4.0  3.4*  3.6   CHLORIDE  94*  98  98   CO2  25  24  22   GLUCOSE  178*  151*  169*   BUN  18  20  26*   CREATININE  3.01*  2.96*  3.59*   CALCIUM  9.0  8.0*  8.5         Recent Labs      04/10/19   1000   BNPBTYPENAT  >5000*     Recent Labs      04/10/19   1415  04/12/19   0307   TRIGLYCERIDE  139  172*       PMH/FH/ reviewed    IMPRESSION:  #  End-stage renal disease, on chronic hemodialysis Tuesdays, Thursdays and   Saturdays.  #  Status post traumatic intracerebral bleed with bilateral subdural   hematomas as well.  He has mildly displaced left frontal skull fracture.  #  Ventilator-dependent respiratory failure.  #  Diabetes mellitus.Per primary team.  #  History of hypertension and also history of hypotension with orthostasis,   on midodrine.  #  Anemia of chronic kidney disease, iron repleted.Hb above target  #  Chronic kidney disease -- Metabolic bone disorder, most recent vitamin D   is normal.  PTH has been suppressed.  He was on calcitriol in the past.  PO4 normal.  #  History of penile necrosis.  #  Status post suprapubic catheter.  #  History of atrial fibrillation, previously on amiodarone.    #  Functional left upper extremity AV fistula.  #  Hypoalbuminemia.  #  Elevated  bilirubin.  #  R heel ulcer  #  Leukocytosis. ? Reactive    PLAN:  #  Dialysis today for 3 hrs.TTS schedule for 4 hrs as OP.Possible HD tomorrow.              #  Follow his labs.  #  He should be on no dietary protein restrictions.  #  He does not need Epogen at this time.  #  All of his medicines should be dosed for GFR less than 10.  #  On enteral feedings  #  On salt tablets for high intracranial pressure

## 2019-04-12 NOTE — CARE PLAN
Problem: Safety  Goal: Will remain free from injury  Bed in low locked position, room near nurses station.     Problem: Skin Integrity  Goal: Risk for impaired skin integrity will decrease  Q2 hour turning in place, pillows in use for support and positioning, collaboration with wound RN in place.

## 2019-04-12 NOTE — CARE PLAN
Problem: Safety  Goal: Will remain free from falls  Outcome: PROGRESSING AS EXPECTED  Bed in low position with bed alarm on. Upper bed rails in place. Pt near nurses station.       Problem: Skin Integrity  Goal: Risk for impaired skin integrity will decrease  Outcome: PROGRESSING AS EXPECTED  Skin assessment complete. Appropriate barriers in place. Extra pillows in place for Q2hr turns, floating heels and padding bony prominences. Wound team consulted.

## 2019-04-13 PROBLEM — Z02.79 ENCOUNTER FOR EVALUATION OF ABILITY TO MAKE DECISIONS REGARDING CARE: Status: ACTIVE | Noted: 2019-01-01

## 2019-04-13 NOTE — CARE PLAN
Problem: Bowel/Gastric:  Goal: Will not experience complications related to bowel motility    Intervention: Implement Bowel Protocol, if applicable  Bowel protocol in place. Medications given as prescribed. Patient mobilized per protocol.       Problem: Pain Management  Goal: Pain level will decrease to patient's comfort goal    Intervention: Follow pain managment plan developed in collaboration with patient and Interdisciplinary Team  Pain assessed using CPOT scale. Patient repositioned Q2 hours. Extra blankets and pillows provided. Pain medication given as prescribed and as appropriate. Will continue to monitor.

## 2019-04-13 NOTE — PROGRESS NOTES
Trauma / Surgical Daily Progress Note    Date of Service  4/13/2019    Chief Complaint  63 y.o. male admitted 4/10/2019 with Trauma    Interval Events  63 year old ESRD patient with severe traumatic brain injury. Non operative management.   Dialyzed yesterday  Breathing parameters better but mental status remains quite poor  Tolerating tube feeds  Further attempts at locating patient's brother were unsuccessful    Review of Systems  Review of Systems       Vital Signs for last 24 hours  Temp:  [36.3 °C (97.3 °F)-37.1 °C (98.8 °F)] 36.9 °C (98.5 °F)  Pulse:  [] 100  Resp:  [11-25] 22  SpO2:  [93 %-100 %] 100 %    Hemodynamic parameters for last 24 hours       Respiratory Data     Respiration: (!) 22, Pulse Oximetry: 100 %     Work Of Breathing / Effort: Vented  RUL Breath Sounds: Clear, RML Breath Sounds: Diminished, RLL Breath Sounds: Diminished, ADRIEL Breath Sounds: Clear, LLL Breath Sounds: Diminished    Physical Exam  Physical Exam   Constitutional:   Intubated, NAD   HENT:   Head: Normocephalic and atraumatic.   Eyes:   Pupils 3mm and sluggish   Neck: Normal range of motion. No tracheal deviation present. No thyromegaly present.   Cardiovascular: Normal rate and regular rhythm.    Pulmonary/Chest: Effort normal and breath sounds normal.   Abdominal: Soft.   Suprapubic tube in place   Genitourinary: Penis normal.   Musculoskeletal: Normal range of motion.   Left medial upper arm AV fistula with thrill   Neurological:   Furrows brown and withdrawals all extremities to painful stimuli   Skin: Skin is warm and dry.   Psychiatric:   Unable to assess     Nursing note and vitals reviewed.      Laboratory  Recent Results (from the past 24 hour(s))   ACCU-CHEK GLUCOSE    Collection Time: 04/12/19  6:14 PM   Result Value Ref Range    Glucose - Accu-Ck 179 (H) 65 - 99 mg/dL   ACCU-CHEK GLUCOSE    Collection Time: 04/12/19 11:32 PM   Result Value Ref Range    Glucose - Accu-Ck 279 (H) 65 - 99 mg/dL   Comp Metabolic  Panel    Collection Time: 04/13/19  3:35 AM   Result Value Ref Range    Sodium 141 135 - 145 mmol/L    Potassium 3.6 3.6 - 5.5 mmol/L    Chloride 104 96 - 112 mmol/L    Co2 28 20 - 33 mmol/L    Anion Gap 9.0 0.0 - 11.9    Glucose 271 (H) 65 - 99 mg/dL    Bun 30 (H) 8 - 22 mg/dL    Creatinine 2.76 (H) 0.50 - 1.40 mg/dL    Calcium 8.0 (L) 8.5 - 10.5 mg/dL    AST(SGOT) 12 12 - 45 U/L    ALT(SGPT) 6 2 - 50 U/L    Alkaline Phosphatase 116 (H) 30 - 99 U/L    Total Bilirubin 1.4 0.1 - 1.5 mg/dL    Albumin 2.5 (L) 3.2 - 4.9 g/dL    Total Protein 5.6 (L) 6.0 - 8.2 g/dL    Globulin 3.1 1.9 - 3.5 g/dL    A-G Ratio 0.8 g/dL   CBC WITH DIFFERENTIAL    Collection Time: 04/13/19  3:35 AM   Result Value Ref Range    WBC 12.0 (H) 4.8 - 10.8 K/uL    RBC 3.23 (L) 4.70 - 6.10 M/uL    Hemoglobin 10.2 (L) 14.0 - 18.0 g/dL    Hematocrit 30.5 (L) 42.0 - 52.0 %    MCV 94.4 81.4 - 97.8 fL    MCH 31.6 27.0 - 33.0 pg    MCHC 33.4 (L) 33.7 - 35.3 g/dL    RDW 57.5 (H) 35.9 - 50.0 fL    Platelet Count 247 164 - 446 K/uL    MPV 8.9 (L) 9.0 - 12.9 fL    Neutrophils-Polys 82.70 (H) 44.00 - 72.00 %    Lymphocytes 10.50 (L) 22.00 - 41.00 %    Monocytes 6.00 0.00 - 13.40 %    Eosinophils 0.20 0.00 - 6.90 %    Basophils 0.10 0.00 - 1.80 %    Immature Granulocytes 0.50 0.00 - 0.90 %    Nucleated RBC 0.00 /100 WBC    Neutrophils (Absolute) 9.89 (H) 1.82 - 7.42 K/uL    Lymphs (Absolute) 1.26 1.00 - 4.80 K/uL    Monos (Absolute) 0.72 0.00 - 0.85 K/uL    Eos (Absolute) 0.02 0.00 - 0.51 K/uL    Baso (Absolute) 0.01 0.00 - 0.12 K/uL    Immature Granulocytes (abs) 0.06 0.00 - 0.11 K/uL    NRBC (Absolute) 0.00 K/uL   ESTIMATED GFR    Collection Time: 04/13/19  3:35 AM   Result Value Ref Range    GFR If  28 (A) >60 mL/min/1.73 m 2    GFR If Non African American 23 (A) >60 mL/min/1.73 m 2   ACCU-CHEK GLUCOSE    Collection Time: 04/13/19  5:47 AM   Result Value Ref Range    Glucose - Accu-Ck 275 (H) 65 - 99 mg/dL   ACCU-CHEK GLUCOSE    Collection  Time: 04/13/19 11:20 AM   Result Value Ref Range    Glucose - Accu-Ck 257 (H) 65 - 99 mg/dL       Fluids    Intake/Output Summary (Last 24 hours) at 04/13/19 1500  Last data filed at 04/13/19 1400   Gross per 24 hour   Intake          2574.53 ml   Output             1800 ml   Net           774.53 ml       Core Measures & Quality Metrics  Labs reviewed, Medications reviewed and Radiology images reviewed  Jacob catheter: Critically Ill - Requiring Accurate Measurement of Urinary Output  Central line in place: Need for access    DVT Prophylaxis: Contraindicated - High bleeding risk  DVT prophylaxis - mechanical: SCDs  Ulcer prophylaxis: Yes        FLORECITA Score    ETOH Screening      Assessment/Plan  Encounter for evaluation of ability to make decisions regarding care   Assessment & Plan    Patient intubated with severe TBI  Only known NOK is his brother, who is unreachable  May require ethics consult     Closed fracture of skull (HCC)- (present on admission)   Assessment & Plan    Acute mildly displaced left frontal bone skull fracture.  Non-operative management.  Joshua Dsouza MD. Neurosurgery.     Traumatic brain injury (HCC)- (present on admission)   Assessment & Plan    2 adjacent areas of intraparenchymal hemorrhage involving the right frontal lobe with some surrounding vasogenic edema  Bilateral acute subdural hematoma, right greater than left with a large amount of blood in the right middle cranial fossa.  Bilateral acute subarachnoid hemorrhage.  There is right-sided hemispheric edema with 5 mm of right-to-left midline shift.   Anti-seizure prophylaxis, Keppra, x 7 days  4/10 Exam improved to 11T.  Operative therapy and ICP deferred. Serial CT .     Non-operative management.  Joshua Dsouza MD. Neurosurgery.     Respiratory failure following trauma (HCC)- (present on admission)   Assessment & Plan    Intubated in trauma bay  Continue full mechanical ventilatory support. Ventilator bundle and Trauma weaning  protocol.     End stage renal disease (HCC)   Assessment & Plan    Chronic [facility] patient, [facility]  4/11 - nephrology consult, dialyzed 4/12  TIW HD  Dr. [place], nephrology     History of suprapubic catheter- (present on admission)   Assessment & Plan    Present prior to arrival      Contraindication to deep vein thrombosis (DVT) prophylaxis- (present on admission)   Assessment & Plan    Systemic anticoagulation contraindicated secondary to elevated bleeding risk.  4/12 Surveillance venous duplex negative  4/13/2019 prophylactic heparin started     Trauma- (present on admission)   Assessment & Plan    [facility] suspect ground level fall, transported to [facility].  Initial non-trauma activation. Upgrade to trauma red after CT imaging demonstrated a traumatic brain injury and skull fracture  Dawit Schreiber MD. Trauma Surgery.       Plan:  Non operative management of severe TBI - follow exam.  Wean ventilator - decrease PEEP and FiO2 and increase spontaneous breathing percentages  Appreciate nephrology consult - plan HD TIW  Enteral nutritional support  Initiate chemical DVT prophylaxis  Still no next of kin or POA. May require ethics consult if no POA can be located.      Discussed patient condition with RN, RT and Pharmacy.   The patient is/remains critically ill with severe traumatic brain injury, renal failure, respiratory failure.    I provided the following critical care services: ventilator management and weaning, high risk medication management, resuscitation    Critical care time spent exclusive of procedures: 80 minutes.    Jose Marcos MD  [phone]

## 2019-04-13 NOTE — PROGRESS NOTES
Hemodialysis done today, started @ 1408 and ended @ 1709 with net UF= 1000ml, used 145 sodium as ordered. Report given to YURY Willis. See flow sheet for details.

## 2019-04-13 NOTE — PROGRESS NOTES
[facility] Nephrology Daily Progress Note    Date of Service  4/13/2019    Chief Complaint  63-year-old male who has end-stage renal   disease and is on chronic hemodialysis on Tuesdays, Thursdays and Saturdays at   [facility].  The patient dialyzes for 4 hours.  [facility].  He was transferred to [facility] and had a CT of the brain that revealed mildly displaced left   frontal skull fracture and intraparenchymal hemorrhage with edema and   bilateral subdural hematomas.  The patient had worsening respiratory status   after the CT and required intubation and mechanical ventilation.      Interval Problem Update  04/11/19 - Consult done.no HD today.  04/12/19 - No new events.Ventilated. mL.  04/13/19 - Intubated.  In ICU.  No new events.    Review of Systems  Review of Systems   Unable to perform ROS: Intubated        Physical Exam  Temp:  [36.4 °C (97.6 °F)-37.1 °C (98.8 °F)] 36.4 °C (97.6 °F)  Pulse:  [] 99  Resp:  [6-25] 22  SpO2:  [93 %-100 %] 100 %    Physical Exam   Constitutional: He appears well-developed and well-nourished.   Ventilated   HENT:   Right Ear: External ear normal.   Left Ear: External ear normal.   Skull Fx  JOANIE  Nasal feeding tube   Eyes: Conjunctivae are normal. Right eye exhibits no discharge. Left eye exhibits no discharge.   Neck: Normal range of motion. Neck supple. No thyromegaly present.   Cardiovascular: Normal rate and regular rhythm.  Exam reveals no friction rub.    Murmur heard.  Pulmonary/Chest: Breath sounds normal. He has no wheezes. He has no rales.   Ventilated   Abdominal: Soft. Bowel sounds are normal. He exhibits no distension and no mass. There is no tenderness. There is no guarding.   Genitourinary:   Genitourinary Comments: Suprapubic catheter   Musculoskeletal:   Trace edema LE's  LUE AVF  R heel ulcer   Lymphadenopathy:     He has cervical adenopathy.   Neurological:   No  interaction   Skin: Skin is warm and dry. No rash noted. No erythema.   Nursing note and vitals reviewed.      Fluids    Intake/Output Summary (Last 24 hours) at 04/13/19 1158  Last data filed at 04/13/19 0800   Gross per 24 hour   Intake          2470.23 ml   Output             1760 ml   Net           710.23 ml       Laboratory  Recent Labs      04/11/19   0315  04/12/19   0307  04/13/19   0335   WBC  14.1*  18.2*  12.0*   RBC  3.64*  3.92*  3.23*   HEMOGLOBIN  11.4*  12.2*  10.2*   HEMATOCRIT  34.1*  37.6*  30.5*   MCV  93.7  95.9  94.4   MCH  31.3  31.1  31.6   MCHC  33.4*  32.4*  33.4*   RDW  56.5*  58.9*  57.5*   PLATELETCT  295  308  247   MPV  9.1  8.9*  8.9*     Recent Labs      04/11/19   0315  04/12/19   0307  04/13/19   0335   SODIUM  137  137  141   POTASSIUM  3.4*  3.6  3.6   CHLORIDE  98  98  104   CO2  24  22  28   GLUCOSE  151*  169*  271*   BUN  20  26*  30*   CREATININE  2.96*  3.59*  2.76*   CALCIUM  8.0*  8.5  8.0*             Recent Labs      04/10/19   1415  04/12/19   0307   TRIGLYCERIDE  139  172*       PMH/FH/SH reviewed    IMPRESSION:  #  End-stage renal disease, on chronic hemodialysis Tuesdays, Thursdays and   Saturdays.  #  Status post traumatic intracerebral bleed with bilateral subdural   hematomas as well.  He has mildly displaced left frontal skull fracture.  #  Ventilator-dependent respiratory failure.  #  Diabetes mellitus.Per primary team.  #  History of hypertension and also history of hypotension with orthostasis,   on midodrine.  #  Anemia of chronic kidney disease, iron repleted.Hb above target  #  Chronic kidney disease -- Metabolic bone disorder, most recent vitamin D   is normal.  PTH has been suppressed.  He was on calcitriol in the past.  PO4 normal.  #  History of penile necrosis.  #  Status post suprapubic catheter.  #  History of atrial fibrillation, previously on amiodarone.    #  Functional left upper extremity AV fistula.  #  Hypoalbuminemia.  #  Elevated  bilirubin.  #  R heel ulcer  #  Leukocytosis. ? Reactive    PLAN:  #  No dialysis today. Dialysis tomorrow.              #  Follow his labs.  #  He should be on no dietary protein restrictions.  #  He does not need Epogen at this time.  #  All of his medicines should be dosed for GFR less than 10.  #  On enteral feedings  #  On salt tablets for high intracranial pressure

## 2019-04-13 NOTE — PROGRESS NOTES
Neurosurgery Progress Note    Subjective:  63 year old male admitted with IPH in right inferior frontal lobe, right temporal lobe with small SDH bilaterally.  intubated.  On sedation  No neuro changes overnight  ESRF  Repeat CT 19 stable     Exam:  No opening of eyes. Pupils are equal  Will not follow, but withdrawal of all 4 ext and grimace to painful stim.  Moves ext x 4 spontaneously     Pulse  Av.1  Min: 32  Max: 110  Resp  Av.3  Min: 6  Max: 25  Temp  Av.8 °C (98.3 °F)  Min: 36.4 °C (97.6 °F)  Max: 37.1 °C (98.8 °F)  SpO2  Av.3 %  Min: 93 %  Max: 100 %    No Data Recorded    Recent Labs      19   033   WBC  14.1*  18.2*  12.0*   RBC  3.64*  3.92*  3.23*   HEMOGLOBIN  11.4*  12.2*  10.2*   HEMATOCRIT  34.1*  37.6*  30.5*   MCV  93.7  95.9  94.4   MCH  31.3  31.1  31.6   MCHC  33.4*  32.4*  33.4*   RDW  56.5*  58.9*  57.5*   PLATELETCT  295  308  247   MPV  9.1  8.9*  8.9*     Recent Labs      19   0335   SODIUM  137  137  141   POTASSIUM  3.4*  3.6  3.6   CHLORIDE  98  98  104   CO2  24  22  28   GLUCOSE  151*  169*  271*   BUN  20  26*  30*   CREATININE  2.96*  3.59*  2.76*   CALCIUM  8.0*  8.5  8.0*         Recent Labs      04/10/19   1419   REACTMIN  3.9*   CLOTKINET  1.5   CLOTANGL  68.9   MAXCLOTS  65.6   HAS37ECO  0.3   PRCINADP  64.9   PRCINAA  25.4       Intake/Output       19 0700 - 19 0659 19 07 - 19 0659       Total 0123-7562 1053-3492 Total       Intake    I.V.  485  59.8 544.8  6.9  -- 6.9    Propofol Volume 185 59.8 244.8 6.9 -- 6.9    Volume (mL) (NS infusion) 300 -- 300 -- -- --    Other  195  180 375  --  -- --    Medications (PO/Enteral Liquids) 195 180 375 -- -- --    NG/GT  660  660 1320  110  -- 110    Intake (mL) (Enteral Tube 19 Dobhoff - Gastric 10 Fr. Right nare)  110 -- 110    Dialysis  500  -- 500  --  -- --     Dialysis Input (Dialysis Input / Output) 500 -- 500 -- -- --    IV Piggyback  300  193.3 493.3  --  -- --    Volume (mL) (levETIRAcetam (KEPPRA) 500 mg in  mL IVPB) 300 193.3 493.3 -- -- --    Total Intake 2140 1093.1 3233.1 116.9 -- 116.9       Output    Urine  170  145 315  10  -- 10    Output (mL) (Suprapubic Catheter Jacob) 170 145 315 10 -- 10    Dialysis  1500  -- 1500  --  -- --    Dialysis Output (Dialysis Input / Output) 1500 -- 1500 -- -- --    Stool  --  -- --  --  -- --    Number of Times Stooled -- 1 x 1 x -- -- --    Total Output 9873 129 4344 10 -- 10       Net I/O     470 948.1 1418.1 106.9 -- 106.9            Intake/Output Summary (Last 24 hours) at 04/13/19 0835  Last data filed at 04/13/19 0800   Gross per 24 hour   Intake           2735.7 ml   Output             1785 ml   Net            950.7 ml            • sodium chloride  2 g BID   • docusate sodium 100mg/10mL  100 mg BID   • Pharmacy  1 Each PHARMACY TO DOSE   • Respiratory Care per Protocol   Continuous RT   • Pharmacy Consult Request  1 Each PHARMACY TO DOSE   • senna-docusate  1 Tab Nightly   • senna-docusate  1 Tab Q24HRS PRN   • polyethylene glycol/lytes  1 Packet BID   • magnesium hydroxide  30 mL DAILY   • bisacodyl  10 mg Q24HRS PRN   • fleet  1 Each Once PRN   • morphine injection  2 mg Q HOUR PRN   • ondansetron  4 mg Q4HRS PRN   • famotidine  20 mg DAILY    Or   • famotidine  20 mg DAILY   • levETIRAcetam (KEPPRA) IV  500 mg BID   • insulin lispro  1-6 Units Q6HRS    And   • dextrose 50%  25 mL Q15 MIN PRN   • propofol  0-80 mcg/kg/min Continuous       Assessment and Plan:  IPH in right inferior frontal lobe, right temporal lobe, with bilateral SDH.  Continue q 2 hour neuro checks.  Ok to start empiric DVT anticoagulation on 4/13   No neurosurgery intervention planned at this time    Will follow.

## 2019-04-13 NOTE — ASSESSMENT & PLAN NOTE
Brother and sister-in-law from [place] did not wish to be decision makers  Awaiting older brother from [place] the week of 4/22

## 2019-04-13 NOTE — CARE PLAN
Problem: Ventilation Defect:  Goal: Ability to achieve and maintain unassisted ventilation or tolerate decreased levels of ventilator support  Outcome: PROGRESSING AS EXPECTED     Ventilator Daily Summary    Vent Day # 4    Ventilator settings changed this shift: CMV 22 450 peep 8 30%    Weaning trials: SBT x 2 today apneic  X1 trial     Respiratory Procedures:    Plan: Continue current ventilator settings and wean mechanical ventilation as tolerated per physician orders.

## 2019-04-13 NOTE — NON-PROVIDER
Patient Summary:  Lito Coronado is a 64 yo male with ESRD on dialysis [facility] from a syncopal event. Patient was found on head CT to have intraparenchymal hemorrhages in two areas of the right frontal lobe and was subsequently intubated. There is also bilateral acute subdural hematoma, and bilateral acute subarachnoid hemorrhage.     24 Hour Events: Patient hypotensive at 90s/60s overnight     SUBJECTIVE/OBJECTIVE:  NEURO:  GCS 24hr: 5T  Current: 6T   Exam Unable to complete due to patient being unresponsive   Meds/Pain Keppra 500mg in NS 100ml 2 times daily;  Continuous propofol 0-80mcg/kg/min;   IV morphine 2mg q1 hr PRN   Labs Diagnostic alcohol 0.00 on admission   Imaging Repeat CT 4/13/19- Head w/o contrast shows stable bilateral frontal parenchymal hemorrhages consistent with hemorrhagic contusions, stable subarachnoid hemorrhage, stable bilateral subdural hemorrhages, and left frontal bone fractures.  CT- Cspine w/o contrast on admission shows no acute fractures       RESP:  RR, SpO2  RR: 22, SpO2: 99%   Vent Mode: Adaptive support ventilation; rate: 22; FiO2: 30%, PEEP: 8.0   Exam Equal breath sounds bilaterally, no wheezes rales or rhonchi    Meds  None   Labs  None   Imaging CXR shows rib fracture, no other cardiopulmonary processes evident      CV:  HR/BP/MAP HR: average 103; BP: /60s; MAP:80s   Exam  Normal S1,S2; No rubs murmurs or gallops   Meds  None   Labs  Troponin: 0.60; BNP: >5000 on admission    Imaging  CXR: Left-sided rib fractures; EKG shows sinus tachycardia with borderline repolarization abnormality.      GI:  Diet/Bowel Function NPO  Famotidine 20mg daily  Milk of Magnesia 30ml daily  Miralax 1 packet two times daily  Senna-docusate 1 tab nightly.  Zofran 4mg q4 hours PRN  Simethicone 80mg chew tab four times daily  Fleet enema 133 mL 1 each once PRN   Exam Bowel sounds normal   Labs AST: 12; ALT 6; Alk phosphatase: 116;  Total Protein 5.7;    Imaging None      F/E/N-:  I/O  24hr totals:           Intake/Output Summary (Last 24 hours) at 04/13/19 1103  Last data filed at 04/13/19 0800   Gross per 24 hour   Intake          2470.23 ml   Output             1760 ml   Net           710.23 ml       Suprapubic catheter in place                        Exam Catheter in place with no signs of erythema or infeciton; urine color normal and nonbloody.   Meds D5 25ml q15 min   Labs 4/13/19  Recent Labs      04/11/19   0315  04/12/19   0307  04/13/19   0335   SODIUM  137  137  141   POTASSIUM  3.4*  3.6  3.6   CHLORIDE  98  98  104   CO2  24  22  28   GLUCOSE  151*  169*  271*   BUN  20  26*  30*     4/12/19  Calcium: 8.5  Magnesium: 1.8  Phosphorus: 3.8  Cr: 3.59   Nutrition Enteral feeds;  Calculation/Equation MSJ x 1.2 = 1797 kcals  Calories/day: 1800 - 2000 kcals (26 - 29 kcals/kg)  Protein/day: 103 - 136 g (1.5 - 2.0 g/kg) 8560-3252 lou/day; goals met today      HEME:  Labs  4/13/19  Recent Labs      04/11/19 0315  04/12/19   0307 04/13/19   0335   WBC  14.1*  18.2*  12.0*   RBC  3.64*  3.92*  3.23*   HEMOGLOBIN  11.4*  12.2*  10.2*   HEMATOCRIT  34.1*  37.6*  30.5*   MCV  93.7  95.9  94.4   MCH  31.3  31.1  31.6   RDW  56.5*  58.9*  57.5*   PLATELETCT  295  308  247   MPV  9.1  8.9*  8.9*   NEUTSPOLYS  84.90*  82.60*  82.70*   LYMPHOCYTES  8.80*  10.20*  10.50*   MONOCYTES  5.70  6.50  6.00   EOSINOPHILS  0.00  0.00  0.20   BASOPHILS  0.20  0.20  0.10       4/10/19  Reaction Time Initial-R 3.9   5.0 - 10.0 min Final   Clot Kinetics-K 1.5  1.0 - 3.0 min Final   Clot Angle-Angle 68.9  53.0 - 72.0 degrees Final   Maximum Clot Strength-MA 65.6  50.0 - 70.0 mm Final   Lysis 30 minutes-LY30 0.3  0.0 - 8.0 % Final   % Inhibition ADP 64.9  % Final   % Inhibition AA 25.4  % Final   TEG Algorithm Link Algorithm    Final          Transfusions  None   Imaging  None      ID:  Temp  24hr: 98.3 F   Tmax: 98.8 F   Labs  see above   Micro  None   ABX   None      ENDOCRINE:  Blood Sugar  Last glucose was 275.   Meds  Insulin lispro 1-6 units q6 hours.      MUSCULOSKELETAL:  Imaging  None   WB Status  Non-ambulatory      SKIN:  Exam  Large gangrenous wound noted on right heel. Sacrum red and non-blanching. Well healed dialysis fistula noted on arm.   Interventions  Heel dressings; mepilex placed at sacrum      ASSESSMENT/PLAN:     NEURO:   Traumatic brain injury ([facility])   Closed fracture of skull ([facility])      Patient was found on head CT to have intraparenchymal hemorrhages in two areas of the right frontal lobe. There is also bilateral acute subdural hematoma, and bilateral acute subarachnoid hemorrhage. Patient is also noted to have closed fracture of the skull. Repeat head CT today shows stable findings; no changes.  - Will continue to monitor; q2 neuro checks  - Dr. Dsouza from neurosurgery consulted, recommends no interventions at this time.  - Neurosurgery recommends starting lovenox today  - Keppra 500mg in NS 100ml 2 times daily for seizureprophylaxis.     RESP:   Respiratory failure following trauma ([facility])     Patient is intubated. Patient shows no significant findings on CXR other than rib fracture. Most recent ABG within normal limits  - Continue ventilator     CV:  Patient demonstrates no acute cardiopulmonary process.  - Will continue to monitor     GI:   Patient demonstrates no acute GI processes.  - Will continue to monitor  - Continue bowel regimen.     FEN-:   Dialysis patient ([facility])   History of suprapubic catheter  Chronic renal failure         Patient has no acute  findings. Suprapubic catheter in place, likely in place of mora catheter due to history of penile necrosis. Patient is on chronic hemodialysis Tuesdays, Thursdays, and Saturdays. Patient received HD yesterday. Creatinine at 2.76 today. BUN elevated at 30 today.  - HD tomorrow.  - Continue to monitor urine output and electrolytes.  - Continue NS infusion.     HEME:   Patient slightly  anemic given CBC. Hemoglobin at 10.2 today. Likely in part due to dilution from IV fluids.  - Will continue to monitor hemoglobin.  - Consider transfusion if hemoglobin drops below 7.0.     ID:  Patient leukocytotic. WBC today at 12.0; improvement from 18.2 tomorrow. Leukocytosis likely due to gangrenous lesion of the right heel. Patient has been afebrile.  - Monitor for sepsis  - Obtain blood cultures.     ENDOCRINE:   Patient has a history of DM Type II. Patient is receiving Insulin lispro 1-6 units q6 hours. Last blood glucose was elevated at 275.  - Continue to monitor blood glucose, adjust insulin regimen appropriately.     MUSCULOSKELETAL:   Patient demonstrated rib fracture on CXR.  - Non-operative; pain control with morphine     SKIN:   Large black wound on right heel; Sacrum red and non blanching  - Continue to monitor both  - Regular wound dressing changes.     PROPHYLAXIS:  DVT SCD in place; plan to start lovenox today   GI Bowel regimen noted above.   Restraints Soft restrains      LINES/TUBES/CATHETERS:  Suprapubic catheter; day 4  Peripheral IV, antecubital; day 4  Peripheral IV, Forearm; day 4  Airway Intubation; day 4  Enteral tube; day 3

## 2019-04-13 NOTE — CARE PLAN
Problem: Ventilation Defect:  Goal: Ability to achieve and maintain unassisted ventilation or tolerate decreased levels of ventilator support    Intervention: Support and monitor invasive and noninvasive mechanical ventilation  Adult Ventilation Update    Total Vent Days: 4    Patient Lines/Drains/Airways Status    Active Airway     Name: Placement date: Placement time: Site: Days:    Airway ETT 8.0 04/10/19   1409      2

## 2019-04-13 NOTE — CARE PLAN
Problem: Venous Thromboembolism (VTW)/Deep Vein Thrombosis (DVT) Prevention:  Goal: Patient will participate in Venous Thrombosis (VTE)/Deep Vein Thrombosis (DVT)Prevention Measures  Outcome: PROGRESSING AS EXPECTED  Mechanical and pharmacological interventions in place      Problem: Skin Integrity  Goal: Risk for impaired skin integrity will decrease  Outcome: PROGRESSING AS EXPECTED  Skin assessment complete. Appropriate barriers in place. Extra pillows in place for Q2hr turns, floating heels and padding bony prominences.

## 2019-04-13 NOTE — PROGRESS NOTES
2 RN skin check completed    Unstageable pressure ulcer present on right heel, floated on pillows  Scabbing on toes bilaterally   Scattered scabbing and redness on lower extremities  Suprapubic catheter present, clean/dry/intact/draining  Trace scrotal edema noted  Sacrum red/purple and non-blanching, mepilex replaced  Scattered bruising and redness on posterior thorax   Fistula site present on left upper extremity, post-dialysis dressing in place  Redness on right upper extremity  Non-blanching redness on posterior head, waffle cushion in place    Wound following. Wound preventative measures in place per protocol. Will continue to monitor Q4.

## 2019-04-13 NOTE — PROGRESS NOTES
2 Rn skin check completed. Interventions include: Heels floated with pillows, Sacrum mepilex replaced, Upper back mepilex placed, Waffle cushion pillow in use, SCDs repositioned and Q2hr turns.

## 2019-04-14 NOTE — CARE PLAN
Problem: Ventilation Defect:  Goal: Ability to achieve and maintain unassisted ventilation or tolerate decreased levels of ventilator support    Intervention: Support and monitor invasive and noninvasive mechanical ventilation  Adult Ventilation Update    Total Vent Days: 5    Patient Lines/Drains/Airways Status    Active Airway     Name: Placement date: Placement time: Site: Days:    Airway ETT 8.0 04/10/19   1409      3

## 2019-04-14 NOTE — PROGRESS NOTES
Hemodialysis treatment ordered today per Dr Soria x 3 hours. Treatment initiated at 1054, ended at 1354.     Patient tolerated tx; see paper flow sheet for details.     Net UF 1,000 mL.     Needles removed from access site. Dressings applied and sites held x 10 minutes; verified no bleeding. Positive bruit/thrill post tx. Staff RN to monitor AVF for breakthrough bleeding. Should breakthrough bleeding occur, staff RN to apply pressure to access sites until bleeding resolved. Notify Dialysis and Nephrologist for follow-up.    Report given to Primary RN.

## 2019-04-14 NOTE — PROGRESS NOTES
[facility] Nephrology Daily Progress Note    Date of Service  4/14/2019    Chief Complaint  63-year-old male who has end-stage renal   disease and is on chronic hemodialysis on Tuesdays, Thursdays and Saturdays at   [facility].  The patient dialyzes for 4 hours.  [facility]  He was transferred to [facility] and had a CT of the brain that revealed mildly displaced left   frontal skull fracture and intraparenchymal hemorrhage with edema and   bilateral subdural hematomas.  The patient had worsening respiratory status   after the CT and required intubation and mechanical ventilation.      Interval Problem Update  04/11/19 - Consult done.no HD today.  04/12/19 - No new events.Ventilated. mL.  04/13/19 - Intubated.  In ICU.  No new events.  04/14/19 - Intubated.  In ICU.  No new events.  Dialysis today.    Review of Systems  Review of Systems   Unable to perform ROS: Intubated        Physical Exam  Temp:  [36.1 °C (97 °F)-37.3 °C (99.2 °F)] 37.1 °C (98.8 °F)  Pulse:  [] 113  Resp:  [22-24] 22  SpO2:  [98 %-100 %] 99 %    Physical Exam   Constitutional: He appears well-developed and well-nourished.   Ventilated   HENT:   Right Ear: External ear normal.   Left Ear: External ear normal.   Skull Fx  JOANIE  Nasal feeding tube   Eyes: Conjunctivae are normal. Right eye exhibits no discharge. Left eye exhibits no discharge.   Neck: Normal range of motion. Neck supple. No thyromegaly present.   Cardiovascular: Normal rate and regular rhythm.  Exam reveals no friction rub.    Murmur heard.  Pulmonary/Chest: Breath sounds normal. He has no wheezes. He has no rales.   Ventilated   Abdominal: Soft. Bowel sounds are normal. He exhibits no distension and no mass. There is no tenderness. There is no guarding.   Genitourinary:   Genitourinary Comments: Suprapubic catheter   Musculoskeletal:   Trace edema LE's  LUE AVF  R heel ulcer    Lymphadenopathy:     He has cervical adenopathy.   Neurological:   No interaction   Skin: Skin is warm and dry. No rash noted. No erythema.   Nursing note and vitals reviewed.      Fluids    Intake/Output Summary (Last 24 hours) at 04/14/19 0821  Last data filed at 04/14/19 0600   Gross per 24 hour   Intake           1546.9 ml   Output              375 ml   Net           1171.9 ml       Laboratory  Recent Labs      04/12/19   0307  04/13/19   0335  04/14/19   0520   WBC  18.2*  12.0*  15.2*   RBC  3.92*  3.23*  3.47*   HEMOGLOBIN  12.2*  10.2*  10.8*   HEMATOCRIT  37.6*  30.5*  33.4*   MCV  95.9  94.4  96.3   MCH  31.1  31.6  31.1   MCHC  32.4*  33.4*  32.3*   RDW  58.9*  57.5*  59.7*   PLATELETCT  308  247  253   MPV  8.9*  8.9*  9.3     Recent Labs      04/12/19   0307  04/13/19   0335  04/14/19   0520   SODIUM  137  141  143   POTASSIUM  3.6  3.6  3.7   CHLORIDE  98  104  105   CO2  22  28  28   GLUCOSE  169*  271*  250*   BUN  26*  30*  53*   CREATININE  3.59*  2.76*  3.15*   CALCIUM  8.5  8.0*  8.7             Recent Labs      04/12/19   0307   TRIGLYCERIDE  172*       PMH/FH/SH reviewed    IMPRESSION:  #  End-stage renal disease, on chronic hemodialysis Tuesdays, Thursdays and   Saturdays.  #  Status post traumatic intracerebral bleed with bilateral subdural   hematomas as well.  He has mildly displaced left frontal skull fracture.  #  Ventilator-dependent respiratory failure.  #  Diabetes mellitus.Per primary team.  #  History of hypertension and also history of hypotension with orthostasis,   on midodrine.  #  Anemia of chronic kidney disease, iron repleted.Hb above target  #  Chronic kidney disease -- Metabolic bone disorder, most recent vitamin D   is normal.  PTH has been suppressed.  He was on calcitriol in the past.  PO4 normal.  #  History of penile necrosis.  #  Status post suprapubic catheter.  #  History of atrial fibrillation, previously on amiodarone.    #  Functional left upper extremity AV  fistula.  #  Hypoalbuminemia.  #  Elevated bilirubin.  #  R heel ulcer  #  Leukocytosis. ? Reactive    PLAN:  #  Dialysis today.              #  Follow his labs.  #  He should be on no dietary protein restrictions.  #  He does not need Epogen at this time.  #  All of his medicines should be dosed for GFR less than 10.  #  On enteral feedings  #  On salt tablets for high intracranial pressure

## 2019-04-14 NOTE — PROGRESS NOTES
Call received from patient's brother, Ezequiel. Updates provided. Ezequiel states that he would be the decision maker for the patient but he is in a rehab program in [place] right now and is not able to receive phone calls. He states that he is able to make phone calls from the rehab center and will call back to check on patient status. Ezequiel requests for his wife, Christelle, to be called with changes in patient condition.

## 2019-04-14 NOTE — PROGRESS NOTES
2 RN skin check completed. Skin assessed under devices.    Pressure injury to right heel, floated on pillows.  Flaky scabs to bilateral toes and shins. Lower extremities dusky in color.   Suprapubic catheter in place.  Sacrum red/purple and not blanching. Mepilex in place.  Fistula to left arm. Dressing in place.   Redness to back of head, not blanching. Waffle cushion in place.  Round, firm, raised area to patient's upper back, red and blanching. Mepilex in place.   Small scabs to right posterior axilla region.

## 2019-04-14 NOTE — DISCHARGE PLANNING
Social Work    LSW received call from pt's sister in law, [phone]. She was not aware that pt was in hospital. LSW provided Christelle's contact information to bedside RN to provide update to family.

## 2019-04-14 NOTE — PROGRESS NOTES
2RN skin check     -Black escar on right heel:  Wound team involved, floated   -Black spots on bilateral toes   -Purple nonblanching sacrum: wound team involved, mepilex in place   -Red cyst on upper mid-back: mepilex in place   -Purple/pink spot posterior head: waffle cushion in place

## 2019-04-14 NOTE — CARE PLAN
Problem: Ventilation Defect:  Goal: Ability to achieve and maintain unassisted ventilation or tolerate decreased levels of ventilator support     Ventilator Daily Summary    Vent Day #5    Ventilator settings changed this shift: Decreased Respiratory rate to 14    Weaning trials: Failed SBT for apnea      Plan: Continue current ventilator settings and wean mechanical ventilation as tolerated per physician orders.

## 2019-04-14 NOTE — CARE PLAN
Problem: Pain Management  Goal: Pain level will decrease to patient's comfort goal    Intervention: Follow pain managment plan developed in collaboration with patient and Interdisciplinary Team  CPOT scale used to ensure adequate pain control.      Problem: Skin Integrity  Goal: Risk for impaired skin integrity will decrease    Intervention: Assess risk factors for impaired skin integrity and/or pressure ulcers  Patient turned every 2hrs and pressure points floated using pillows.

## 2019-04-14 NOTE — PROGRESS NOTES
Spoke with , Carmen, regarding patient's sister in law, Christelle.  requested for this RN to call Christelle to update regarding patient being admitted to the hospital. Call placed to Christelle and updates provided. Christelle states she is the patient's brother's (Ezequiel) wife. She states that she is unsure who the decision maker would be for the patient and she will talk to Ezequiel to find out and call back when she has the information.

## 2019-04-14 NOTE — PROGRESS NOTES
Neurosurgery Progress Note  Neurosurgery Progress Note    Subjective:  63 year old male admitted with IPH in right inferior frontal lobe, right temporal lobe with small SDH bilaterally.  intubated.  No sedation    Exam:  CARLINE 3mm  flacid LUE  RUE squeezes hand with a bit of effort, purposeful on right  +grimace to pain      Pulse  Av.7  Min: 91  Max: 118  Resp  Av.1  Min: 22  Max: 24  Temp  Av.7 °C (98 °F)  Min: 36.1 °C (97 °F)  Max: 37.3 °C (99.2 °F)  SpO2  Av.8 %  Min: 98 %  Max: 100 %    No Data Recorded    Recent Labs      19   0520   WBC  18.2*  12.0*  15.2*   RBC  3.92*  3.23*  3.47*   HEMOGLOBIN  12.2*  10.2*  10.8*   HEMATOCRIT  37.6*  30.5*  33.4*   MCV  95.9  94.4  96.3   MCH  31.1  31.6  31.1   MCHC  32.4*  33.4*  32.3*   RDW  58.9*  57.5*  59.7*   PLATELETCT  308  247  253   MPV  8.9*  8.9*  9.3     Recent Labs      19   0520   SODIUM  137  141  143   POTASSIUM  3.6  3.6  3.7   CHLORIDE  98  104  105   CO2  22  28  28   GLUCOSE  169*  271*  250*   BUN  26*  30*  53*   CREATININE  3.59*  2.76*  3.15*   CALCIUM  8.5  8.0*  8.7               Intake/Output       19 - 1959 19 - 04/15/19 0659       Total  Total       Intake    I.V.  13.8  -- 13.8  --  -- --    Propofol Volume 13.8 -- 13.8 -- -- --    Other  120  210 330  --  -- --    Medications (PO/Enteral Liquids) 120 210 330 -- -- --    NG/GT  660  660 1320  --  -- --    Intake (mL) (Enteral Tube 19 Dobhoff - Gastric 10 Fr. Right nare)  -- -- --    Total Intake 793.8 870 1663.8 -- -- --       Output    Urine  140  245 385  --  -- --    Output (mL) (Suprapubic Catheter Jacob) 140 245 385 -- -- --    Stool  --  -- --  --  -- --    Number of Times Stooled 1 x 0 x 1 x -- -- --    Total Output 140 245 385 -- -- --       Net I/O     653.8 625 1278.8 -- -- --             Intake/Output Summary (Last 24 hours) at 04/14/19 0802  Last data filed at 04/14/19 0600   Gross per 24 hour   Intake           1546.9 ml   Output              375 ml   Net           1171.9 ml            • insulin regular  3-14 Units Q6HRS    And   • dextrose 50%  25 mL Q15 MIN PRN   • heparin  5,000 Units Q8HRS   • levETIRAcetam  500 mg BID   • sodium chloride  2 g BID   • docusate sodium 100mg/10mL  100 mg BID   • Pharmacy  1 Each PHARMACY TO DOSE   • Respiratory Care per Protocol   Continuous RT   • Pharmacy Consult Request  1 Each PHARMACY TO DOSE   • senna-docusate  1 Tab Nightly   • senna-docusate  1 Tab Q24HRS PRN   • polyethylene glycol/lytes  1 Packet BID   • magnesium hydroxide  30 mL DAILY   • bisacodyl  10 mg Q24HRS PRN   • fleet  1 Each Once PRN   • morphine injection  2 mg Q HOUR PRN   • ondansetron  4 mg Q4HRS PRN   • famotidine  20 mg DAILY    Or   • famotidine  20 mg DAILY   • propofol  0-80 mcg/kg/min Continuous       Assessment and Plan:  IPH in right inferior frontal lobe, right temporal lobe, with bilateral SDH.  Continue q 2 hour neuro checks.  DVT PPX ok  CT planned for Monday AM  May need to move towards trach/peg

## 2019-04-14 NOTE — CARE PLAN
Problem: Safety  Goal: Will remain free from injury  Outcome: PROGRESSING AS EXPECTED  Patient pulling on lines/tubes. Education provided for patient not to pull on medical equipment. Patient not following commands. Soft wrist restraints in place per MD order. Bed alarm set and call light within reach.     Problem: Skin Integrity  Goal: Risk for impaired skin integrity will decrease  Skin assessed. Patient turned q2 hours with extra pillows used to float elbows and heels. Wounds documented on wound LDA. Waffle cushion behind head.

## 2019-04-14 NOTE — DISCHARGE PLANNING
Completed [facility] search with following results:     Ezequiel Coronado 1st Degree  :    SSN: [id number] Age: 57   [phone]   None found    [place] [address]  (2009 - CURRENT) CH1, Other/Historic    Samantha Coronado 1st Degree  : xx   SSN: [id number] Age:    [phone]   [email or web address] [address]  (1993 - CURRENT) CH1, Other/Historic    Christelle Coronado 1st Degree  :    SSN: [id number] Age: 67   None found   None found   [address] [id number]  (2015 - CURRENT) CH1, Other/Historic     Evan Cotton 1st Degree  :    SSN: [id number] Age: 45   [phone]   [place] [address]      Attempted all phone #'s listed and none were working #'s.   Called [employer] non emergency dispatch and requested welfare visit to [address]- for Christelle Coronado.

## 2019-04-15 PROBLEM — D72.829 LEUKOCYTOSIS: Status: ACTIVE | Noted: 2019-01-01

## 2019-04-15 PROBLEM — R56.9 SEIZURES (HCC): Status: ACTIVE | Noted: 2019-01-01

## 2019-04-15 NOTE — PROGRESS NOTES
Trauma / Surgical Daily Progress Note    Date of Service  4/14/2019    Chief Complaint  63 y.o. male admitted 4/10/2019 with Trauma    Interval Events  White count up  Mental status unchanged  Low-grade temp  BM  Hemodialysis    Review of Systems  Review of Systems   Unable to perform ROS: Intubated        Vital Signs for last 24 hours  Temp:  [36.3 °C (97.3 °F)-37.3 °C (99.2 °F)] 37.2 °C (99 °F)  Pulse:  [100-118] 100  Resp:  [10-24] 10  SpO2:  [98 %-100 %] 100 %    Hemodynamic parameters for last 24 hours       Respiratory Data     Respiration: (!) 10, Pulse Oximetry: 100 %     Work Of Breathing / Effort: Vented  RUL Breath Sounds: Clear, RML Breath Sounds: Clear, RLL Breath Sounds: Diminished, ADRIEL Breath Sounds: Clear, LLL Breath Sounds: Diminished    Physical Exam  Physical Exam   Constitutional: He appears well-developed and well-nourished. He appears listless. He is uncooperative. No distress. He is intubated.   HENT:   Mouth/Throat: Oropharynx is clear and moist.   Eyes: Pupils are equal, round, and reactive to light. Conjunctivae are normal. No scleral icterus.   Neck: Neck supple. No tracheal deviation present.   Cardiovascular: Normal rate, regular rhythm and intact distal pulses.   Occasional extrasystoles are present.   Pulmonary/Chest: No stridor. He is intubated. He has decreased breath sounds in the right lower field and the left lower field. He has no wheezes. He has no rhonchi.   Abdominal: Soft. He exhibits no distension. There is no tenderness.   Genitourinary:   Genitourinary Comments: Suprapubic catheter in place with clear urine   Musculoskeletal: He exhibits no edema or deformity.   Neurological: He appears listless. GCS eye subscore is 4. GCS verbal subscore is 1. GCS motor subscore is 5.   Left upper extremity flaccid   Skin: Skin is warm and dry. No pallor.   Nursing note and vitals reviewed.      Laboratory  Recent Results (from the past 24 hour(s))   ACCU-CHEK GLUCOSE    Collection Time:  04/14/19 12:06 AM   Result Value Ref Range    Glucose - Accu-Ck 247 (H) 65 - 99 mg/dL   Comp Metabolic Panel    Collection Time: 04/14/19  5:20 AM   Result Value Ref Range    Sodium 143 135 - 145 mmol/L    Potassium 3.7 3.6 - 5.5 mmol/L    Chloride 105 96 - 112 mmol/L    Co2 28 20 - 33 mmol/L    Anion Gap 10.0 0.0 - 11.9    Glucose 250 (H) 65 - 99 mg/dL    Bun 53 (H) 8 - 22 mg/dL    Creatinine 3.15 (H) 0.50 - 1.40 mg/dL    Calcium 8.7 8.5 - 10.5 mg/dL    AST(SGOT) 11 (L) 12 - 45 U/L    ALT(SGPT) 7 2 - 50 U/L    Alkaline Phosphatase 161 (H) 30 - 99 U/L    Total Bilirubin 1.8 (H) 0.1 - 1.5 mg/dL    Albumin 2.8 (L) 3.2 - 4.9 g/dL    Total Protein 6.3 6.0 - 8.2 g/dL    Globulin 3.5 1.9 - 3.5 g/dL    A-G Ratio 0.8 g/dL   CBC WITH DIFFERENTIAL    Collection Time: 04/14/19  5:20 AM   Result Value Ref Range    WBC 15.2 (H) 4.8 - 10.8 K/uL    RBC 3.47 (L) 4.70 - 6.10 M/uL    Hemoglobin 10.8 (L) 14.0 - 18.0 g/dL    Hematocrit 33.4 (L) 42.0 - 52.0 %    MCV 96.3 81.4 - 97.8 fL    MCH 31.1 27.0 - 33.0 pg    MCHC 32.3 (L) 33.7 - 35.3 g/dL    RDW 59.7 (H) 35.9 - 50.0 fL    Platelet Count 253 164 - 446 K/uL    MPV 9.3 9.0 - 12.9 fL    Neutrophils-Polys 85.30 (H) 44.00 - 72.00 %    Lymphocytes 9.40 (L) 22.00 - 41.00 %    Monocytes 4.00 0.00 - 13.40 %    Eosinophils 0.50 0.00 - 6.90 %    Basophils 0.10 0.00 - 1.80 %    Immature Granulocytes 0.70 0.00 - 0.90 %    Nucleated RBC 0.00 /100 WBC    Neutrophils (Absolute) 13.00 (H) 1.82 - 7.42 K/uL    Lymphs (Absolute) 1.43 1.00 - 4.80 K/uL    Monos (Absolute) 0.61 0.00 - 0.85 K/uL    Eos (Absolute) 0.07 0.00 - 0.51 K/uL    Baso (Absolute) 0.02 0.00 - 0.12 K/uL    Immature Granulocytes (abs) 0.10 0.00 - 0.11 K/uL    NRBC (Absolute) 0.00 K/uL   ESTIMATED GFR    Collection Time: 04/14/19  5:20 AM   Result Value Ref Range    GFR If  24 (A) >60 mL/min/1.73 m 2    GFR If Non African American 20 (A) >60 mL/min/1.73 m 2   ACCU-CHEK GLUCOSE    Collection Time: 04/14/19  5:21 AM    Result Value Ref Range    Glucose - Accu-Ck 225 (H) 65 - 99 mg/dL   ISTAT ARTERIAL BLOOD GAS    Collection Time: 04/14/19 10:49 AM   Result Value Ref Range    Ph 7.637 (H) 7.400 - 7.500    Pco2 26.2 26.0 - 37.0 mmHg    Po2 119 (H) 64 - 87 mmHg    Tco2 29 20 - 33 mmol/L    S02 99 93 - 99 %    Hco3 28.0 (H) 17.0 - 25.0 mmol/L    BE 7 (H) -4 - 3 mmol/L    Body Temp 97.2 F degrees    O2 Therapy 30 %    iPF Ratio 397     Ph Temp Birdie 7.650 (H) 7.400 - 7.500    Pco2 Temp Co 25.3 (L) 26.0 - 37.0 mmHg    Po2 Temp Cor 115 (H) 64 - 87 mmHg    Specimen Arterial     Action Range Triggered NO     Inst. Qualified Patient YES    ACCU-CHEK GLUCOSE    Collection Time: 04/14/19 12:02 PM   Result Value Ref Range    Glucose - Accu-Ck 206 (H) 65 - 99 mg/dL   ACCU-CHEK GLUCOSE    Collection Time: 04/14/19  4:59 PM   Result Value Ref Range    Glucose - Accu-Ck 165 (H) 65 - 99 mg/dL       Fluids    Intake/Output Summary (Last 24 hours) at 04/14/19 1915  Last data filed at 04/14/19 1800   Gross per 24 hour   Intake             2120 ml   Output             1995 ml   Net              125 ml       Core Measures & Quality Metrics  Labs reviewed, Medications reviewed and Radiology images reviewed  Jacob catheter: Urinary Tract Retention or Urinary Tract Obstruction      DVT Prophylaxis: Heparin  DVT prophylaxis - mechanical: SCDs  Ulcer prophylaxis: Yes    Assessed for rehab: Patient unable to tolerate rehabilitation therapeutic regimen    FLORECITA Score  ETOH Screening    Assessment/Plan  Encounter for evaluation of ability to make decisions regarding care   Assessment & Plan    Patient intubated with severe TBI  Only known NOK is his brother, who is unreachable  May require ethics consult     Traumatic brain injury (HCC)- (present on admission)   Assessment & Plan    2 adjacent areas of intraparenchymal hemorrhage involving the right frontal lobe with some surrounding vasogenic edema  Bilateral acute subdural hematoma, right greater than left  with a large amount of blood in the right middle cranial fossa.  Bilateral acute subarachnoid hemorrhage.  There is right-sided hemispheric edema with 5 mm of right-to-left midline shift.   Anti-seizure prophylaxis, Keppra, x 7 days  4/10 Exam improved to 11T.  Operative therapy and ICP deferred.  4/14 neuro exam unchanged  Keppra day 5/7  Continue serial assessments  Trend sodium  Non-operative management.  Joshua Dsouza MD. Neurosurgery.     Respiratory failure following trauma (HCC)- (present on admission)   Assessment & Plan    Intubated in trauma bay  4/14 mental status remains primary barrier to weaning   Will need tracheostomy: Need to address plan of care with POA  Continue SICU weaning protocol   Continue ventilator bundle      End stage renal disease (HCC)   Assessment & Plan    Chronic dialysis patient, [facility]  4/11 - nephrology consult, dialyzed 4/12 4/14 HD today  Dr. [place], nephrology     Contraindication to deep vein thrombosis (DVT) prophylaxis- (present on admission)   Assessment & Plan    Systemic anticoagulation contraindicated secondary to elevated bleeding risk.  4/12 Surveillance venous duplex negative  4/13 prophylactic heparin started     Closed fracture of skull (HCC)- (present on admission)   Assessment & Plan    Acute mildly displaced left frontal bone skull fracture.  Non-operative management.  Joshua Dsouza MD. Neurosurgery.     History of suprapubic catheter- (present on admission)   Assessment & Plan    Present prior to arrival      Trauma- (present on admission)   Assessment & Plan    [facility] suspect ground level fall, transported to [facility].  Initial non-trauma activation. Upgrade to trauma red after CT imaging demonstrated a traumatic brain injury and skull fracture  Dawit Schreiber MD. Trauma Surgery.       CRITICAL CARE DECISION MAKING:    Patiens examined with team at bedside, care discussed with team  Head injury with unchanging  mental status.  This is still pretty early in the hospitalization.  Once we are out of  the acute phase, will start amantadine.  In the meantime try to keep sodium in the high normal range.   Trend exam.  Posttraumatic seizure prophylaxis should continue.  Addressed pulmonary hygiene concerns as well as oxygenation/ventilation.    ABG reviewed with the respiratory therapist.  FiO2 weaning more aggressive.  Because of head injury,  respiratory rate adjusted to optimize PCO2 in the 33-37 range.  Check ABG at least daily  White count increasing: Step up aggressive pulmonary hygiene.  Trend x-ray.  Check UA.  If not improving in  by a.m., plan for blood culture/bronchoscopy/initiation of antibiotic therapy.  Glycemic control has been a concern.  Sliding scale was adjusted yesterday and blood sugars seem to be  improving.  Some adjustments made.  Trending fingersticks    Labs reviewed, electrolytes addressed, renal function assessed  Reviewed nutrition strategies, recent indices  Addressed GI prophylaxis and bowel frequency  Assessed/discussed/titrated analgesics and need for sedatives  Addressed DVT prophylaxis  Addressed line days, mora catheter days, access needs  Addressed family and discharge concerns      Discussed patient condition with Family, RN, RT, Pharmacy and Dietary.  CRITICAL CARE TIME EXCLUDING PROCEDURES: 38  minutes

## 2019-04-15 NOTE — PROCEDURES
VIDEO ELECTROENCEPHALOGRAM REPORT      Referring provider: Dr. Willis.     DOS:  4/15/2019 (total recording of 22 minutes).     INDICATION:  Lito Coronado 63 y.o. male presenting with recent fall, head injury, intracranial hemorrhage, new onset of jaw movements and staring concerning for partial seizures.     CURRENT ANTIEPILEPTIC REGIMEN: levetiracetam 500 mg bid.     TECHNIQUE: 30 channel video electroencephalogram (EEG) was performed in accordance with the international 10-20 system. The study was reviewed in bipolar and referential montages. The recording examined the patient during wakeful and drowsy state(s).     DESCRIPTION OF THE RECORD:  During the wakefulness, the background showed a symmetrical 7 Hz theta activity posteriorly with amplitude of 70 mV.  There was no reactivity to eye closure/opening.  A normal anterior-posterior gradient was noted with faster beta frequencies seen anteriorly.  During drowsiness, theta/delta frequencies were seen.    ACTIVATION PROCEDURES:   Not performed.     ICTAL AND/OR INTERICTAL FINDINGS:   Several events captured of staring to the left, jaw movements on the left as chewing, some lasting for several minutes at a time. The events were epileptic in nature and consistent of focal seizures arising from the right frontal region. Electrically, seizures exhibited build up with rhythmic sharps / rhythmic slowing in the right frontal region.     EKG: sampling of the EKG recording demonstrated sinus rhythm.     EVENTS:  Several typical events captured, see above.     INTERPRETATION:  This is an abnormal video EEG recording in the awake and drowsy state(s).  A mild encephalopathy is suggested. Several events of left eye deviation, staring, left jaw chewing movements were captured, and were in keeping with focal, recurrent right frontal seizures. Clinical and radiological correlation is recommended.    Updates provided to patient's RN and physician caring for the patient.      Dick Blankenship MD   Epilepsy and Neurodiagnostics.   Clinical  of Neurology [facility].   Diplomate in Neurology, Epilepsy, and Electrodiagnostic Medicine.   Office: [phone]  Fax: [phone]

## 2019-04-15 NOTE — CARE PLAN
Problem: Ventilation Defect:  Goal: Ability to achieve and maintain unassisted ventilation or tolerate decreased levels of ventilator support  Outcome: PROGRESSING AS EXPECTED     Ventilator Daily Summary    Vent Day # 6    Ventilator settings changed this shift: CMV 14, 450, peep 8 30%    Weaning trials: SBT x 2 pt tolerated well.       Plan: Continue current ventilator settings and wean mechanical ventilation as tolerated per physician orders.

## 2019-04-15 NOTE — NON-PROVIDER
Patient Summary:  Patient is a 64 y/o male on hospital day 5 with ESRD who is inpatient [facility] scan was found to have intraparenchymal hemorrhages in 2 areas of the right frontal lobe, bilateral subdural hematomas, and bilateral acute subarachnoid hemorrhages. He was subsequently intubated.       24 Hour Events:    Remains intubated - vent day 8  Spontaneous vent setting x1.5 hours (5cmH2O, 8cmH2O PEEP, 30% FiO2)  Hemodialysis yesterday 1000mL x 3 hours  Crit phos 1.0  Elevated WBC 16.2      SUBJECTIVE/OBJECTIVE:  NEURO:  GCS  24hr: 9   Current: 9   Exam Unable to complete due to patient being unresponsive   Meds/Pain Keppra 500mg in NS 100ml 2 times daily;  Continuous propofol 0-80mcg/kg/min;   IV morphine 2mg q1 hr PRN   Labs Diagnostic alcohol 0.00 on admission   Imaging Repeat CT Head (4/15)  1.  Frontal hemorrhages, stable since prior.  2.  Bilateral subdural hematomas, appears stable since prior given difference of technique.  3.  Left frontal hemorrhagic contusion versus subarachnoid hemorrhage, stable.  4.  Right parafalcine subdural hematoma posteriorly, stable.  5.  Scattered right subarachnoid hemorrhages, similar to prior study.  6.  Atherosclerosis     RESP:  RR, SpO2 RR: 15; SpO2: 100%   Vent Mode: Adaptive support ventilation; rate: 14; FiO2: 30%, PEEP: 8.0   Exam Equal breath sounds bilaterally, slight wheezing appreciated on left   Meds None   Labs Arterial Blood Gas:   Ref. Range 4/15/2019 04:06   Ph Latest Ref Range: 7.400 - 7.500  7.499   Pco2 Latest Ref Range: 26.0 - 37.0 mmHg 36.7   Po2 Latest Ref Range: 64 - 87 mmHg 102 (H)   Hco3 Latest Ref Range: 17.0 - 25.0 mmol/L 28.5 (H)   BE Latest Ref Range: -4 - 3 mmol/L 5 (H)   Tco2 Latest Ref Range: 20 - 33 mmol/L 30   S02 Latest Ref Range: 93 - 99 % 98   Ph Temp Birdie Latest Ref Range: 7.400 - 7.500  7.504 (H)   Pco2 Temp Co Latest Ref Range: 26.0 - 37.0 mmHg 36.1   Po2 Temp Cor Latest Ref Range: 64 - 87 mmHg 100 (H)       Imaging CXR: Pulmonary edema and/or infiltrates are identified, which are stable since the prior exam.     CV:  HR/BP/MAP/  CVP HR: average 103; BP: 105-113/64-73; MAP: 77-83   Exam Normal S1, S2; Systolic murmur, no rubs murmur or gallops   Meds None   Labs Troponin: .60; BNP: >5000 on admission   Imaging CXR:   4/15:Pulmonary edema and/or infiltrates are identified, which are stable since the prior exam. Position of medical devices appears stable. The cardiac silhouette appears within normal limits.    4/11: Left-sided rib fractures.    ECG (4/10):   Sinus tachycardia   Borderline repolarization abnormality     GI:  Diet/Bowel Function Feeding tube - continuous diet 55ml/hr  Senna-Docusate 8.6-50 mg per tab 1 tab nightly  Miralax 1 packet 2 times daily  Milk of Magnesia - 30mL daily  Famotidine 20mg daily  Dulcolax suppository 10mg q24PRN  Fleet enema 133mL one PRN  Zofran 4mg q4h PRN   Exam Bowel sounds normal   Labs Results for ILDEFONSO SYED (MRN [id number]) as of 4/15/2019 11:39   Ref. Range 4/15/2019 05:55   Triglycerides Latest Ref Range: 0 - 149 mg/dL 87         Results for ILDEFONSO SYED (MRN [id number]) as of 4/15/2019 11:39   Ref. Range 4/14/2019 18:10   Hep B Surface Antibody Quant Latest Ref Range: 0.00 - 10.00 mIU/mL 60.15 (H)        Imaging None     F/E/N-:  I/O      Intake/Output Summary (Last 24 hours) at 04/15/19 1156  Last data filed at 04/15/19 1000   Gross per 24 hour   Intake             2090 ml   Output             2060 ml   Net               30 ml     Suprapubic Catheter in place  Hemodialysis 1000mL x3 hours on 4/14/19   Exam Catheter in place; urine color normal and nonbloody   Meds D50W injection 50mL every 15 minutes PRN IV   Labs Results for ILDEFONSO SYED (MRN [id number]) as of 4/15/2019 11:39   Ref. Range 4/15/2019 05:55   Sodium Latest Ref Range: 135 - 145 mmol/L 142   Potassium Latest Ref Range: 3.6 - 5.5 mmol/L 4.2   Chloride Latest Ref Range: 96 - 112 mmol/L 106   Co2 Latest Ref  Range: 20 - 33 mmol/L 27   Anion Gap Latest Ref Range: 0.0 - 11.9  9.0   Glucose Latest Ref Range: 65 - 99 mg/dL 185 (H)   Bun Latest Ref Range: 8 - 22 mg/dL 49 (H)   Creatinine Latest Ref Range: 0.50 - 1.40 mg/dL 2.52 (H)   GFR If  Latest Ref Range: >60 mL/min/1.73 m 2 31 (A)   GFR If Non  Latest Ref Range: >60 mL/min/1.73 m 2 26 (A)   Calcium Latest Ref Range: 8.5 - 10.5 mg/dL 8.8   AST(SGOT) Latest Ref Range: 12 - 45 U/L 15   ALT(SGPT) Latest Ref Range: 2 - 50 U/L 6   Alkaline Phosphatase Latest Ref Range: 30 - 99 U/L 193 (H)   Total Bilirubin Latest Ref Range: 0.1 - 1.5 mg/dL 1.6 (H)   Albumin Latest Ref Range: 3.2 - 4.9 g/dL 3.0 (L)   Total Protein Latest Ref Range: 6.0 - 8.2 g/dL 6.8   Globulin Latest Ref Range: 1.9 - 3.5 g/dL 3.8 (H)   A-G Ratio Latest Units: g/dL 0.8   Phosphorus Latest Ref Range: 2.5 - 4.5 mg/dL 1.0 (LL)   Magnesium Latest Ref Range: 1.5 - 2.5 mg/dL 2.2       Results for ILDEFONSO SYED (MRN [id number]) as of 4/15/2019 11:39   Ref. Range 4/14/2019 05:20   Sodium Latest Ref Range: 135 - 145 mmol/L 143   Potassium Latest Ref Range: 3.6 - 5.5 mmol/L 3.7   Chloride Latest Ref Range: 96 - 112 mmol/L 105   Co2 Latest Ref Range: 20 - 33 mmol/L 28   Anion Gap Latest Ref Range: 0.0 - 11.9  10.0   Glucose Latest Ref Range: 65 - 99 mg/dL 250 (H)   Bun Latest Ref Range: 8 - 22 mg/dL 53 (H)   Creatinine Latest Ref Range: 0.50 - 1.40 mg/dL 3.15 (H)   GFR If  Latest Ref Range: >60 mL/min/1.73 m 2 24 (A)   GFR If Non  Latest Ref Range: >60 mL/min/1.73 m 2 20 (A)   Calcium Latest Ref Range: 8.5 - 10.5 mg/dL 8.7   AST(SGOT) Latest Ref Range: 12 - 45 U/L 11 (L)   ALT(SGPT) Latest Ref Range: 2 - 50 U/L 7   Alkaline Phosphatase Latest Ref Range: 30 - 99 U/L 161 (H)   Total Bilirubin Latest Ref Range: 0.1 - 1.5 mg/dL 1.8 (H)   Albumin Latest Ref Range: 3.2 - 4.9 g/dL 2.8 (L)   Total Protein Latest Ref Range: 6.0 - 8.2 g/dL 6.3   Globulin Latest Ref  Range: 1.9 - 3.5 g/dL 3.5   A-G Ratio Latest Units: g/dL 0.8      Nutrition Enteral feeds;  Calculation/Equation MSJ x 1.2 = 1797 kcals  Calories/day: 1800 - 2000 kcals (26 - 29 kcals/kg)  Protein/day: 103 - 136 g (1.5 - 2.0 g/kg) 6854-5451 lou/day; goals met today     HEME:  Labs Results for ILDEFONSO SYED (MRN [id number]) as of 4/15/2019 11:39   Ref. Range 4/15/2019 05:55   WBC Latest Ref Range: 4.8 - 10.8 K/uL 16.2 (H)   RBC Latest Ref Range: 4.70 - 6.10 M/uL 3.42 (L)   Hemoglobin Latest Ref Range: 14.0 - 18.0 g/dL 10.7 (L)   Hematocrit Latest Ref Range: 42.0 - 52.0 % 33.9 (L)   MCV Latest Ref Range: 81.4 - 97.8 fL 99.1 (H)   MCH Latest Ref Range: 27.0 - 33.0 pg 31.3   MCHC Latest Ref Range: 33.7 - 35.3 g/dL 31.6 (L)   RDW Latest Ref Range: 35.9 - 50.0 fL 63.0 (H)   Platelet Count Latest Ref Range: 164 - 446 K/uL 295   MPV Latest Ref Range: 9.0 - 12.9 fL 9.5   Neutrophils-Polys Latest Ref Range: 44.00 - 72.00 % 84.60 (H)   Neutrophils (Absolute) Latest Ref Range: 1.82 - 7.42 K/uL 13.65 (H)   Lymphocytes Latest Ref Range: 22.00 - 41.00 % 9.10 (L)   Lymphs (Absolute) Latest Ref Range: 1.00 - 4.80 K/uL 1.47   Monocytes Latest Ref Range: 0.00 - 13.40 % 4.10   Monos (Absolute) Latest Ref Range: 0.00 - 0.85 K/uL 0.67   Eosinophils Latest Ref Range: 0.00 - 6.90 % 1.30   Eos (Absolute) Latest Ref Range: 0.00 - 0.51 K/uL 0.21   Basophils Latest Ref Range: 0.00 - 1.80 % 0.20   Baso (Absolute) Latest Ref Range: 0.00 - 0.12 K/uL 0.04   Immature Granulocytes Latest Ref Range: 0.00 - 0.90 % 0.70   Immature Granulocytes (abs) Latest Ref Range: 0.00 - 0.11 K/uL 0.12 (H)   Nucleated RBC Latest Units: /100 WBC 0.00   NRBC (Absolute) Latest Units: K/uL 0.00   Plt Estimation Unknown Normal   RBC Morphology Unknown Present   Anisocytosis Unknown 1+   Macrocytosis Unknown 1+   Poikilocytosis Unknown 1+   Ovalocytes Unknown 1+   Echinocytes Unknown 1+   Comments-Diff Unknown see below   Peripheral Smear Review Unknown see below       Transfusions none   Imaging none     ID:  Temp  24hr: 36.6-37.2   Tmax: 36.4   Labs See above   Micro Blood culture ordered 4/15/19 - Pending    Results for ILDEFONSO SYED (MRN [id number]) as of 4/15/2019 11:39   Ref. Range 4/10/2019 11:19 4/10/2019 11:23   Significant Indicator Unknown NEG NEG   Site Unknown PERIPHERAL PERIPHERAL   Source Unknown BLD BLD      ABX Cefepime 2g in NS 100mL IVPB  Vancomycin 1800mg in NS 500mL IVPB once     ENDOCRINE:  Blood Sugar Results for ILDEFONSO SYED (MRN [id number]) as of 4/15/2019 11:39   Ref. Range 4/14/2019 12:02 4/14/2019 16:59 4/14/2019 18:10 4/15/2019 00:21 4/15/2019 04:06 4/15/2019 04:44 4/15/2019 05:43 4/15/2019 05:46   Glucose - Accu-Ck Latest Ref Range: 65 - 99 mg/dL 206 (H) 165 (H)  204 (H)    166 (H)      Meds Insulin regular (humulin R) injection 3-4 Units every 6 hours     MUSCULOSKELETAL:  Imaging None   WB Status Non-ambulatory     SKIN:  Exam Pressure injury right heel, Scabs to bilateral toes, Sacrum red  Fistula in left arm   Interventions Heel dressings; mepilex placed at sacrum     ASSESSMENT/PLAN:  NEURO:  Traumatic Brain Injury  Closed Fracture of Skull    Patient's CT scan was found to have intraparenchymal hemorrhages in 2 areas of the right frontal lobe, bilateral subdural hematomas, and bilateral acute subarachnoid hemorrhages. Additionally, closed fracture of the skull. Repeat head CT today stable with no changes.   - CTM, q2 neurochecks  - Neurosurgery signed off, may need to move towards trach/peg  - Continue Keppra 500mg in [id number] 2 times daily for prophylaxis    RESP:  Respiratory Failure following trauma    Patient remains intubated. Weaned to spontaneous settings for 1.5x hours today. CXR significant for rib fracture. CXR shows pulmonary edema and/or infiltrates are identified, which are stable since the prior exam. ABG showed 7.6 yesterday and RR adjusted.    - Continue ventilator  - Bronchoscopy planned for today  - Tracheostomy planned for  later this week    CV:    Patient demonstrates no acute cardiopulmonary process.  - Continue to monitor    GI:  Patient demonstrates no acute GI processes.  - Will continue to monitor  - Continue bowel regimen  - Plan for peg tube this week    FEN-:  ESRD on Dialysis  History of chronic suprapubic catheter    Patient has no acute  findings. 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     - Continue to monitor output and electrolytes  - Restart dialysis regimen    HEME:  Patient anemic at Hb 10.7. Remains stable x2 days.   - Continue to monitor  - Transfusion if hemoglobin <7.0    ID:  Patient has leukocytosis. WBC 16.2. Afebrile overnight  - Monitor for sepsis  - Blood cultures pending  - Start antibiotics (Vanco and Cefipime)    ENDOCRINE:  PMH significant for Type II DM. Insulin Lispro 3-14 units q6 hours. Blood glucose 141 and 166.  - Continue to monitor blood glucose.       MUSCULOSKELETAL:  CXR indicated rib fracture.  - Pain control with morphine    SKIN:   Wound on right heel. Sacrum red.   - Continue to monitor  - Wound dressing change regularly    PROPHYLAXIS:  DVT Chemical DVT prophylaxis contraindicated; SCD in place   GI Bowel regimen noted above.   Restraints None     LINES/TUBES/CATHETERS:  Suprapubic catheter; day 5  Peripheral IV, antecubital; day 5  Peripheral IV, Forearm; day 5  Airway Intubation; day 5  Enteral tube; day 4

## 2019-04-15 NOTE — PROGRESS NOTES
[facility] Nephrology Daily Progress Note    Date of Service  4/15/2019    Chief Complaint  63-year-old male who has end-stage renal   disease and is on chronic hemodialysis on Tuesdays, Thursdays and Saturdays at   [facility].  The patient dialyzes for 4 hours.  [facility].  He was transferred to [facility] and had a CT of the brain that revealed mildly displaced left   frontal skull fracture and intraparenchymal hemorrhage with edema and   bilateral subdural hematomas.  The patient had worsening respiratory status   after the CT and required intubation and mechanical ventilation.      Interval Problem Update  04/11/19 - Consult done.no HD today.  04/12/19 - No new events.Ventilated. mL.  04/13/19 - Intubated.  In ICU.  No new events.  04/14/19 - Intubated.  In ICU.  No new events.  Dialysis today.  04/15/19 - No new overnight renal events. Right cerebellar hemispheric edema which has significantly improved.    Review of Systems  Review of Systems   Unable to perform ROS: Intubated     Physical Exam  Temp:  [36.3 °C (97.4 °F)-37.2 °C (99 °F)] 36.4 °C (97.5 °F)  Pulse:  [100-116] 100  Resp:  [0-25] 14  SpO2:  [96 %-100 %] 100 %    Physical Exam   Constitutional: He appears well-developed and well-nourished.   Ventilated   HENT:   Right Ear: External ear normal.   Left Ear: External ear normal.   Skull Fx  JOANIE  Nasal feeding tube   Eyes: Conjunctivae are normal. Right eye exhibits no discharge. Left eye exhibits no discharge.   Neck: Normal range of motion. Neck supple. No thyromegaly present.   Cardiovascular: Normal rate and regular rhythm.  Exam reveals no friction rub.    Murmur heard.  Pulmonary/Chest: Breath sounds normal. He has no wheezes. He has no rales.   Ventilated   Abdominal: Soft. Bowel sounds are normal. He exhibits no distension and no mass. There is no tenderness. There is no guarding.   Genitourinary:    Genitourinary Comments: Suprapubic catheter   Musculoskeletal:   Trace edema LE's  LUE AVF  R heel ulcer   Lymphadenopathy:     He has cervical adenopathy.   Neurological:   No interaction   Skin: Skin is warm and dry. No rash noted. No erythema.   Nursing note and vitals reviewed.      Fluids    Intake/Output Summary (Last 24 hours) at 04/15/19 1042  Last data filed at 04/15/19 1000   Gross per 24 hour   Intake             2090 ml   Output             2060 ml   Net               30 ml       Laboratory  Recent Labs      04/13/19   0335  04/14/19   0520  04/15/19   0555   WBC  12.0*  15.2*  16.2*   RBC  3.23*  3.47*  3.42*   HEMOGLOBIN  10.2*  10.8*  10.7*   HEMATOCRIT  30.5*  33.4*  33.9*   MCV  94.4  96.3  99.1*   MCH  31.6  31.1  31.3   MCHC  33.4*  32.3*  31.6*   RDW  57.5*  59.7*  63.0*   PLATELETCT  247  253  295   MPV  8.9*  9.3  9.5     Recent Labs      04/13/19   0335  04/14/19   0520  04/15/19   0555   SODIUM  141  143  142   POTASSIUM  3.6  3.7  4.2   CHLORIDE  104  105  106   CO2  28  28  27   GLUCOSE  271*  250*  185*   BUN  30*  53*  49*   CREATININE  2.76*  3.15*  2.52*   CALCIUM  8.0*  8.7  8.8             Recent Labs      04/15/19   0555   TRIGLYCERIDE  87       PMH/FH/SH reviewed    IMPRESSION:  #  End-stage renal disease, on chronic hemodialysis Tuesdays, Thursdays and   Saturdays.  #  Status post traumatic intracerebral bleed with bilateral subdural   hematomas as well.  He has mildly displaced left frontal skull fracture.  #  Ventilator-dependent respiratory failure.  #  Diabetes mellitus.Per primary team.  #  History of hypertension and also history of hypotension with orthostasis,   on midodrine.  #  Anemia of chronic kidney disease, iron repleted.Hb above target  #  Chronic kidney disease -- Metabolic bone disorder, most recent vitamin D   is normal.  PTH has been suppressed.  He was on calcitriol in the past.  PO4 normal.  #  History of penile necrosis.  #  Status post suprapubic  catheter.  #  History of atrial fibrillation, previously on amiodarone.    #  Functional left upper extremity AV fistula.  #  Hypoalbuminemia.  #  Elevated bilirubin.  #  R heel ulcer  #  Leukocytosis. ? Reactive    PLAN:  #  No plans for Dialysis today. Give 1 dose of Phos.          #  Follow his labs.  #  He should be on no dietary protein restrictions.  #  He does not need Epogen at this time.  #  All of his medicines should be dosed for GFR less than 10.  #  On enteral feedings. No phosphorus restrictions.  #  On salt tablets for high intracranial pressure

## 2019-04-15 NOTE — WOUND TEAM
[facility] Wound & Ostomy [facility]  Inpatient Services  Wound and Skin Care Progress Note    Admission Date:  4/10/2019   HPI, PMH, SH: Reviewed  Unit where seen by Wound Team: [id number]    WOUND TEAM FOLLOW UP: posterior head    SUBJECTIVE:  intubated     Self Report / Pain Level: no s/s of distress      OBJECTIVE: on DEB bed, heels floated off pillows, waffle cushion as pillow  WOUND TYPE, LOCATION, CHARACTERISTICS (Pressure ulcers: location, stage, POA or date identified)  Pressure Injury 04/11/19 Head posterior  POA DTI (Active)   Pressure Injury Stage DTPI    Site Assessment Light purple    Tyra-wound Assessment Intact    Margins Attached edges;Defined edges    Wound Length (cm) 1.5 cm 4/14/2019  5:00 PM   Wound Width (cm) 1 cm 4/14/2019  5:00 PM   Wound Depth (cm) 0 cm 4/14/2019  5:00 PM   Wound Surface Area (cm^2) 1.5 cm^2 4/14/2019  5:00 PM   Tunneling 0 cm 4/14/2019  5:00 PM   Undermining 0 cm 4/14/2019  5:00 PM   Closure Open to air    Drainage Amount None    Dressing Options Open to Air    NEXT Weekly Photo (Inpatient Only) 04/17/19    Odor None     Exposed Structures None     Tissue Type and Percentage 100% purple/maroon      Vascular:  Wounds not r/t vascular problem    Lab Values:    WBC:       WBC   Date/Time Value Ref Range Status   04/14/2019 05:20 AM 15.2 (H) 4.8 - 10.8 K/uL Final     AIC:    No results found for: HBA1C       Culture:   na    INTERVENTIONS BY WOUND TEAM: lifted pt's head forward, assessed wound to posterior head. Left BRIGITTE, pt on a waffle cushion as a pillow.   Dressing Options: Open to Air    Interdisciplinary consultation:   With Nursing;With Patient    EVALUATION: pt has POA DTI to posterior head, unstageable to R heel. Both wounds are BRIGITTE. Appropriate interventions in place.     Factors affecting wound healing: traumatic brain injury, ESRD, closed fracture of head, decreased mobility   Goals:  Maintain intact skin until DTI reveals     NURSING PLAN OF CARE:    Dressing changes: Continue  previous Dressing Maintenance orders:        See new Dressing Maintenance orders:       Skin care: See Skin Care orders:   x     Rectal tube care: See Rectal Tube Care orders:      Other orders:           WOUND TEAM PLAN OF CARE (X):   NPWT change 3 x week:        Dressing changes:       Follow up as needed:   x    Other:     Anticipated discharge plans (X):  SNF:           Home Care:           Outpatient Wound Center:            Self Care:            Other:  Unknown at this time, medical condition unstable

## 2019-04-15 NOTE — PROGRESS NOTES
Pt appeared to have rhythmic mouth and eye movement lasting about one minute. Paged Matteo Sanders  To update about possible seizure. Orders received and implemented.

## 2019-04-15 NOTE — DISCHARGE PLANNING
Pt's brother, Jim Coronado came to visit pt. Met with Jim  and his wife, Avis Key . He states other brother, Ezequiel currently in Rehab in [facility] and is unable to be reached at this time. Jim is willing to be decision maker for pt and would like to speak to MD. Dr. Robert currently in OR, will ask him to meet with family when available.

## 2019-04-15 NOTE — PROGRESS NOTES
2 Rn skin check completed. Barriers in place including waffle cushion pillow, heel float boots, meplix x2, and Q2hr turns.     Unstagable pressure injury to right heel  Flaky scabs to bilateral toes and shins.   Suprapubic catheter in place.  Sacrum red/purple and not blanching. Mepilex in place.  Fistula to left arm.  DTI  To back of head, Red and Purple not blanching.   Small scabs to right posterior axilla region.

## 2019-04-15 NOTE — PROGRESS NOTES
"Pharmacy Kinetics 63 y.o. male on vancomycin day # 1 4/15/2019    New Start Vancomycin loading dose 1800 mg iv x 1 @ 1300    Indication for Treatment: PNA    Pertinent history per medical record: Admitted on 4/10/2019 for bilateral SDH and intraparenchymal hemorrhage w/ edema d/t presumed fall 2/2 syncope/AMS. Pt has a hx of ESRD on HD TTS with a fistula placed on the LUE. Pt has had persistent leukocytosis and low-grade fever since admission and CXR show infiltrates, therefore broad spectrum abx were initiated for concern of PNA.    Other antibiotics: Cefepime 1 g q24h (renally dosed)    Allergies: Patient has no allergy information on record.     List concerns for renal function:  - ESRD on HD    Pertinent cultures to date:   4/10/19 - BCx x2 NGTD  4/15/19 - BCx x2 in process    MRSA nares swab if pneumonia is a concern (ordered/positive/negative/n-a): Ordered    Recent Labs      19   0335  19   0520  04/15/19   0555   WBC  12.0*  15.2*  16.2*   NEUTSPOLYS  82.70*  85.30*  84.60*     Recent Labs      19   0335  19   0520  04/15/19   0555   BUN  30*  53*  49*   CREATININE  2.76*  3.15*  2.52*   ALBUMIN  2.5*  2.8*  3.0*     Intake/Output Summary (Last 24 hours) at 04/15/19 1409  Last data filed at 04/15/19 1200   Gross per 24 hour   Intake          1503.63 ml   Output              575 ml   Net           928.63 ml      /81   Pulse (!) 106   Temp 36.3 °C (97.4 °F) (Temporal)   Resp 16   Ht 1.803 m (5' 11\")   Wt 70.8 kg (156 lb 1.4 oz)   SpO2 98%  Temp (24hrs), Av.8 °C (98.2 °F), Min:36.3 °C (97.4 °F), Max:37.2 °C (99 °F)    A/P   1. Vancomycin dose change: Not Indicated - Pulse Dosing  2. Next vancomycin level: Random 23 hr level tomorrow @ 1200 (ordered)  3. Goal trough: 16-20 mcg/mL  4. Comments: No hx of vancomycin doses noted per chart, therefore will dose per protocol. Vanco is expected to accumulate d/t hx of ESRD. Will f/u on random 23-hour level tomorrow.    Marilin VAZQUEZ" Tip Mccann

## 2019-04-15 NOTE — CARE PLAN
Problem: Bowel/Gastric:  Goal: Normal bowel function is maintained or improved  Bowel regimen administered per MD order per MAR.    Problem: Respiratory:  Goal: Respiratory status will improve  Collaborate with RT. Oral care and suctioning provided q2 hours. Pulse oximetry monitored. Ambu bag at bedside.

## 2019-04-15 NOTE — CARE PLAN
Problem: Ventilation Defect:  Goal: Ability to achieve and maintain unassisted ventilation or tolerate decreased levels of ventilator support  Adult Ventilation Update    Total Vent Days: 8      No weaning of ventilator settings from 2240-9357.   Plan, continue SBT's as tolerated and support patients respiratory status

## 2019-04-15 NOTE — PROGRESS NOTES
Neurosurgery Progress Note  Neurosurgery Progress Note    Subjective:  63 year old male admitted with IPH in right inferior frontal lobe, right temporal lobe with small SDH bilaterally.  intubated.  No sedation  Moving more now.     Exam:  CARLINE 3mm  flacid LUE  Will open eyes to voice/command. Grimace to pain.  Will follow simple commands. (squeeze hand/wiggle toes)  CT this am is stable.       Pulse  Av.3  Min: 100  Max: 116  Resp  Av.5  Min: 10  Max: 25  Temp  Av °C (98.6 °F)  Min: 36.6 °C (97.8 °F)  Max: 37.2 °C (99 °F)  SpO2  Av.5 %  Min: 96 %  Max: 100 %    No Data Recorded    Recent Labs      19   0520  04/15/19   0555   WBC  12.0*  15.2*  16.2*   RBC  3.23*  3.47*  3.42*   HEMOGLOBIN  10.2*  10.8*  10.7*   HEMATOCRIT  30.5*  33.4*  33.9*   MCV  94.4  96.3  99.1*   MCH  31.6  31.1  31.3   MCHC  33.4*  32.3*  31.6*   RDW  57.5*  59.7*  63.0*   PLATELETCT  247  253  295   MPV  8.9*  9.3  9.5     Recent Labs      19   0520  04/15/19   0555   SODIUM  141  143  142   POTASSIUM  3.6  3.7  4.2   CHLORIDE  104  105  106   CO2  28  28  27   GLUCOSE  271*  250*  185*   BUN  30*  53*  49*   CREATININE  2.76*  3.15*  2.52*   CALCIUM  8.0*  8.7  8.8               Intake/Output       19 07 - 04/15/19 0659 04/15/19 0700 - 19 0659      [id number] Total [id number] Total       Intake    Other  90  210 300  --  -- --    Medications (PO/Enteral Liquids) 90 210 300 -- -- --    NG/GT  660  660 1320  --  -- --    Intake (mL) (Enteral Tube 19 Dobhoff - Gastric 10 Fr. Right nare)  -- -- --    Dialysis  500  -- 500  --  -- --    Dialysis Input (Dialysis Input / Output) 500 -- 500 -- -- --    Total Intake 8045 314 1385 -- -- --       Output    Urine  250  250 500  --  -- --    Output (mL) (Suprapubic Catheter Jacob) 250 250 500 -- -- --    Dialysis  1500  -- 1500  --  -- --    Dialysis Output (Dialysis Input / Output)  1500 -- 1500 -- -- --    Stool  --  -- --  --  -- --    Number of Times Stooled 0 x 2 x 2 x -- -- --    Total Output 4617 328 5534 -- -- --       Net I/O     -500 620 120 -- -- --            Intake/Output Summary (Last 24 hours) at 04/15/19 0752  Last data filed at 04/15/19 0600   Gross per 24 hour   Intake             2120 ml   Output             2000 ml   Net              120 ml            • insulin glargine  10 Units QAM INSULIN   • levETIRAcetam  500 mg BID   • insulin regular  3-14 Units Q6HRS    And   • dextrose 50%  25 mL Q15 MIN PRN   • heparin  5,000 Units Q8HRS   • sodium chloride  2 g BID   • docusate sodium 100mg/10mL  100 mg BID   • Pharmacy  1 Each PHARMACY TO DOSE   • Respiratory Care per Protocol   Continuous RT   • Pharmacy Consult Request  1 Each PHARMACY TO DOSE   • senna-docusate  1 Tab Nightly   • senna-docusate  1 Tab Q24HRS PRN   • polyethylene glycol/lytes  1 Packet BID   • magnesium hydroxide  30 mL DAILY   • bisacodyl  10 mg Q24HRS PRN   • fleet  1 Each Once PRN   • morphine injection  2 mg Q HOUR PRN   • ondansetron  4 mg Q4HRS PRN   • famotidine  20 mg DAILY    Or   • famotidine  20 mg DAILY   • propofol  0-80 mcg/kg/min Continuous       Assessment and Plan:  IPH in right inferior frontal lobe, right temporal lobe, with bilateral SDH.  DVT PPX ok  No neurosurgical interventions planned.   Will need rehab.  Neurosurgery signing off. Contact neurosurgery prn.  May need to move towards trach/peg

## 2019-04-15 NOTE — PROGRESS NOTES
[place] from Lab called with critical result of Phosphorus of 1 at 0645. Critical lab result read back to [place].   Dr. Robert notified of critical lab result at 0715.  Critical lab result read back by Dr. Robert.

## 2019-04-16 NOTE — NON-PROVIDER
Patient Summary:  Patient is a 64 y/o male on hospital day 5 with ESRD who is inpatient after [facility]. CT scan was found to have intraparenchymal hemorrhages in 2 areas of the right frontal lobe, bilateral subdural hematomas, and bilateral acute subarachnoid hemorrhages. He was subsequently intubated.         24 Hour Events:     Remains intubated - vent day 9  Focal, recurrent right frontal seizures indicated on EEG after episode of left eye deviation, staring, left jaw chewing movements  No seizures overnight  Increased white count, decreased since yesterday (16.2 -> 15.7)  Anemic at 9.7 (decreased since yesterday)       SUBJECTIVE/OBJECTIVE:  NEURO:  GCS  24hr: 9   Current: 10   Exam Patient opens eyes spontaneously. Squeezes right hand upon command.   Meds/Pain Keppra 1000mg BID  Continuous propofol 0-80mcg/kg/min;   IV morphine 2mg q1 hr PRN   Labs Diagnostic alcohol 0.00 on admission   Imaging EEG: This is an abnormal video EEG recording in the awake and drowsy state(s).  A mild encephalopathy is suggested. Several events of left eye deviation, staring, left jaw chewing movements were captured, and were in keeping with focal, recurrent right frontal seizures. Clinical and radiological correlation is recommended.    Repeat CT Head (4/15)  1.  Frontal hemorrhages, stable since prior.  2.  Bilateral subdural hematomas, appears stable since prior given difference of technique.  3.  Left frontal hemorrhagic contusion versus subarachnoid hemorrhage, stable.  4.  Right parafalcine subdural hematoma posteriorly, stable.  5.  Scattered right subarachnoid hemorrhages, similar to prior study.  6.  Atherosclerosis      RESP:  RR, SpO2 RR: 20; SpO2: 99%   Vent Mode: Adaptive support ventilation; rate: 14; FiO2: 30%, PEEP: 8.0   Exam Decreased breath sounds on right   Meds None   Labs Arterial Blood Gas:    Ref. Range 4/15/2019 04:06   Ph Latest Ref Range: 7.400 - 7.500  7.499   Pco2 Latest Ref Range: 26.0  - 37.0 mmHg 36.7   Po2 Latest Ref Range: 64 - 87 mmHg 102 (H)   Hco3 Latest Ref Range: 17.0 - 25.0 mmol/L 28.5 (H)   BE Latest Ref Range: -4 - 3 mmol/L 5 (H)   Tco2 Latest Ref Range: 20 - 33 mmol/L 30   S02 Latest Ref Range: 93 - 99 % 98   Ph Temp Birdie Latest Ref Range: 7.400 - 7.500  7.504 (H)   Pco2 Temp Co Latest Ref Range: 26.0 - 37.0 mmHg 36.1   Po2 Temp Cor Latest Ref Range: 64 - 87 mmHg 100 (H)       Imaging CXR 4/16/19:  1.  Pulmonary edema and/or infiltrates are identified, which are stable since the prior exam.  2.  Cardiomegaly  3.  Atherosclerosis        CV:  HR/BP/MAP/  CVP HR: average 105; BP: /63-74; MAP: 77-83   Exam Normal S1, S2; Systolic murmur, no rubs or gallops   Meds None   Labs Troponin: .60; BNP: >5000 on admission   Imaging CXR:   4/16:   1.  Pulmonary edema and/or infiltrates are identified, which are stable since the prior exam.  2.  Cardiomegaly  3.  Atherosclerosis    4/15:Pulmonary edema and/or infiltrates are identified, which are stable since the prior exam. Position of medical devices appears stable. The cardiac silhouette appears within normal limits.     4/11: Left-sided rib fractures.     ECG (4/10):   Sinus tachycardia   Borderline repolarization abnormality      GI:  Diet/Bowel Function Feeding tube - continuous diet 55ml/hr  Senna-Docusate 8.6-50 mg per tab 1 tab nightly  Miralax 1 packet 2 times daily  Milk of Magnesia - 30mL daily  Famotidine 20mg daily  Dulcolax suppository 10mg q24PRN  Fleet enema 133mL one PRN  Zofran 4mg q4h PRN   Exam Bowel sounds normal   Labs Results for ILDEFONSO SYED (MRN [id number]) as of 4/15/2019 11:39    Ref. Range 4/15/2019 05:55   Triglycerides Latest Ref Range: 0 - 149 mg/dL 87            Results for ILDEFONSO SYED (MRN [id number]) as of 4/15/2019 11:39    Ref. Range 4/14/2019 18:10   Hep B Surface Antibody Quant Latest Ref Range: 0.00 - 10.00 mIU/mL 60.15 (H)          Imaging None      F/E/N-:  I/O  ..      Intake/Output Summary (Last 24  hours) at 04/16/19 1445  Last data filed at 04/16/19 1200   Gross per 24 hour   Intake          2239.68 ml   Output             1293 ml   Net           946.68 ml         Suprapubic Catheter in place  Hemodialysis 1000mL x3 hours on 4/14/19   Exam Catheter in place; urine color normal and nonbloody   Meds D50W injection 50mL every 15 minutes PRN IV   Labs Results for ILDEFONSO SYED (MRN [id number]) as of 4/16/2019 07:21   Ref. Range 4/16/2019 04:00   Sodium Latest Ref Range: 135 - 145 mmol/L 146 (H)   Potassium Latest Ref Range: 3.6 - 5.5 mmol/L 4.4   Chloride Latest Ref Range: 96 - 112 mmol/L 112   Co2 Latest Ref Range: 20 - 33 mmol/L 27   Anion Gap Latest Ref Range: 0.0 - 11.9  7.0   Glucose Latest Ref Range: 65 - 99 mg/dL 220 (H)   Bun Latest Ref Range: 8 - 22 mg/dL 75 (HH)   Creatinine Latest Ref Range: 0.50 - 1.40 mg/dL 2.95 (H)   GFR If  Latest Ref Range: >60 mL/min/1.73 m 2 26 (A)   GFR If Non  Latest Ref Range: >60 mL/min/1.73 m 2 22 (A)   Calcium Latest Ref Range: 8.5 - 10.5 mg/dL 8.0 (L)   Results for ILDEFONSO SYED (MRN [id number]) as of 4/16/2019 07:21   Ref. Range 4/15/2019 17:55   Color Unknown Yellow   Character Unknown Cloudy (A)   Specific Gravity Latest Ref Range: <1.035  1.020   Ph Latest Ref Range: 5.0 - 8.0  7.0   Glucose Latest Ref Range: Negative mg/dL Negative   Ketones Latest Ref Range: Negative mg/dL Trace (A)   Bilirubin Latest Ref Range: Negative  Small (A)   Occult Blood Latest Ref Range: Negative  Large (A)   Protein Latest Ref Range: Negative mg/dL 100 (A)   Nitrite Latest Ref Range: Negative  Negative   Leukocyte Esterase Latest Ref Range: Negative  Large (A)   Urobilinogen, Urine Latest Ref Range: Negative  4.0   Micro Urine Req Unknown Microscopic   WBC Latest Units: /hpf 5-10 (A)   RBC Latest Units: /hpf 2-5 (A)   Epithelial Cells Latest Units: /hpf Few   Bacteria Latest Ref Range: None /hpf Few (A)   Amorphous Crystal Latest Units: /hpf Present              Nutrition Enteral feeds;  Calculation/Equation MSJ x 1.2 = 1797 kcals  Calories/day: 1800 - 2000 kcals (26 - 29 kcals/kg)  Protein/day: 103 - 136 g (1.5 - 2.0 g/kg) 8161-4384 lou/day; goals met today      HEME:  Labs Results for ILDEFONSO SYED (MRN [id number]) as of 4/16/2019 07:21   Ref. Range 4/16/2019 04:00   WBC Latest Ref Range: 4.8 - 10.8 K/uL 15.7 (H)   RBC Latest Ref Range: 4.70 - 6.10 M/uL 3.16 (L)   Hemoglobin Latest Ref Range: 14.0 - 18.0 g/dL 9.7 (L)   Hematocrit Latest Ref Range: 42.0 - 52.0 % 31.5 (L)   MCV Latest Ref Range: 81.4 - 97.8 fL 99.7 (H)   MCH Latest Ref Range: 27.0 - 33.0 pg 30.7   MCHC Latest Ref Range: 33.7 - 35.3 g/dL 30.8 (L)   RDW Latest Ref Range: 35.9 - 50.0 fL 62.5 (H)   Platelet Count Latest Ref Range: 164 - 446 K/uL 298   MPV Latest Ref Range: 9.0 - 12.9 fL 9.5   Neutrophils-Polys Latest Ref Range: 44.00 - 72.00 % 82.40 (H)   Neutrophils (Absolute) Latest Ref Range: 1.82 - 7.42 K/uL 12.92 (H)   Lymphocytes Latest Ref Range: 22.00 - 41.00 % 10.00 (L)   Lymphs (Absolute) Latest Ref Range: 1.00 - 4.80 K/uL 1.56   Monocytes Latest Ref Range: 0.00 - 13.40 % 4.30   Monos (Absolute) Latest Ref Range: 0.00 - 0.85 K/uL 0.68   Eosinophils Latest Ref Range: 0.00 - 6.90 % 1.70   Eos (Absolute) Latest Ref Range: 0.00 - 0.51 K/uL 0.26   Basophils Latest Ref Range: 0.00 - 1.80 % 0.60   Baso (Absolute) Latest Ref Range: 0.00 - 0.12 K/uL 0.10   Immature Granulocytes Latest Ref Range: 0.00 - 0.90 % 1.00 (H)   Immature Granulocytes (abs) Latest Ref Range: 0.00 - 0.11 K/uL 0.15 (H)   Nucleated RBC Latest Units: /100 WBC 0.00   NRBC (Absolute) Latest Units: K/uL 0.00        Transfusions none   Imaging none      ID:  Temp  24hr: 97.8-99.3   Tmax: 99.3   Labs See above   Micro Blood culture ordered 4/15/19 - Pending     Results for ILDEFONSO SYED (MRN [id number]) as of 4/15/2019 11:39    Ref. Range 4/10/2019 11:19 4/10/2019 11:23   Significant Indicator Unknown NEG NEG   Site Unknown PERIPHERAL PERIPHERAL    Source Unknown BLD BLD       ABX Cefepime 2g in NS 100mL IVPB  Vancomycin 1800mg in NS 500mL IVPB once      ENDOCRINE:  Blood Sugar Results for ILDEFONSO SYED (MRN [id number]) as of 4/16/2019 07:21   Ref. 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 Additionally, closed fracture of the skull. Repeat head CT stable with no changes. EEG shows right frontal seizures.   - CTM, q2 neurochecks  - Neurosurgery signed off, may need to move towards trach/peg  - Increased Keppra 1000 mg BID by neurology  - Continuous EEG  - Transcranial Doppler pending     RESP:  Respiratory Failure following trauma     Patient remains intubated. Weaned to spontaneous settings for 1.5x hours yesterday. CXR significant for rib fracture. CXR shows pulmonary edema and/or infiltrates are identified, which are stable since the prior exam.      - Continue ventilator  - Bronchoscopy and Tracheostomy planned     CV:     Patient demonstrates no acute cardiopulmonary process. CXR 4/16 indicates cardiomegaly.  - Continue to monitor     GI:  Patient demonstrates no acute GI processes.  - Will continue to monitor  - Continue bowel regimen  - Plan for peg tube this week     FEN-:  ESRD on Dialysis  History of chronic suprapubic catheter     Patient has no acute  findings.  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      - Continue to monitor output and electrolytes  - Restart dialysis regimen  - Nephrology repleated phos     HEME:  Patient anemic at Hb 9.7.   - Continue to monitor  - Transfusion if hemoglobin <7.0     ID:  Patient has leukocytosis trending down. 15.7. Afebrile overnight  - Monitor for sepsis  - Blood cultures pending  - Vancomycin 1800 mg   - Cefepime 1g       ENDOCRINE:  PMH significant for Type II DM. Insulin Lispro 3-14 units q6 hours. Blood glucose 141 and 166.  - Continue to monitor blood glucose.         MUSCULOSKELETAL:  CXR indicated rib fracture.  - Pain control with morphine     SKIN:   Wound on right heel. Sacrum red.   - Continue to monitor  - Wound dressing change regularly     PROPHYLAXIS:  DVT Chemical DVT prophylaxis contraindicated; SCD in place   GI Bowel regimen noted above.   Restraints None      LINES/TUBES/CATHETERS:  Suprapubic catheter; day 6  Peripheral IV, antecubital; day 6  Peripheral IV, Forearm; day 6  Airway Intubation; day 6  Enteral tube; day 5  Midline cath; day 2

## 2019-04-16 NOTE — PROGRESS NOTES
2 RN skin check completed.  Unstageable pressure injury to R heel, heel float boot in place.  Sacrum red and purple and not blanching, mepilex in place.  DTI to back of head, waffle cushion in place.    Pt placed on waffle mattress.

## 2019-04-16 NOTE — CARE PLAN
Problem: Ventilation Defect:  Goal: Ability to achieve and maintain unassisted ventilation or tolerate decreased levels of ventilator support  Outcome: PROGRESSING AS EXPECTED  Adult Ventilation Update    Total Vent Days: 6    Patient Lines/Drains/Airways Status    Active Airway     Name: Placement date: Placement time: Site: Days:    Airway ETT 8.0 04/10/19   1409      5              Plateau Pressure (Q Shift): 17 (04/15/19 1832)  Static Compliance (ml / cm H2O): 94 (04/16/19 0222)    Sputum/Suction   Cough: Productive (04/16/19 0222)  Sputum Amount: Small (04/16/19 0222)  Sputum Color: White;Clear (04/16/19 0222)  Sputum Consistency: Thick (04/16/19 0222)    Events/Summary/Plan:Pt stable on vent over night as charted, no vent changes made throughout shift.

## 2019-04-16 NOTE — PROGRESS NOTES
24hr monitoring ordered on pt, however, we are at capacity. Per Dr. Lopez and Dr. Blankenship, pt will be hooked when equipment is available, potentially tomorrow.

## 2019-04-16 NOTE — CARE PLAN
Problem: Communication  Goal: The ability to communicate needs accurately and effectively will improve  Outcome: PROGRESSING SLOWER THAN EXPECTED  Pt reoriented to hospital stay and event. Pt updated on plan of care for this shift and procedures/treatments. Possible family conference with family today. Awaiting continuous EEG monitoring.    Problem: Skin Integrity  Goal: Risk for impaired skin integrity will decrease  Outcome: PROGRESSING AS EXPECTED  2 RN skin check complete, Alireza skin scale assessed, all appropriate skin preventative measures in place. Waffle cushion on mattress and behind head. Pillows in use for turns and support. Wounds dressed appropriately. Heel float boots in place. All lines and tubing positioned off of patients skin.

## 2019-04-16 NOTE — CARE PLAN
Problem: Safety  Goal: Will remain free from injury  Monitoring for seizure activity. Seizure precautions in place.    Problem: Skin Integrity  Goal: Risk for impaired skin integrity will decrease  Alireza skin risk assessment and complete skin assessment completed at beginning of shift. Pt is turned and repositioned q2h and PRN. Appropriate wound protocols in place. Pt placed on waffle mattress.

## 2019-04-16 NOTE — PROCEDURES
VIDEO ELECTROENCEPHALOGRAM REPORT        Referring provider: Dr. Lopez.      DOS:  4/16/2019 - 4/17/2019 (total recording of 17 hours and 1 minute).      INDICATION:  Lito Coronado 63 y.o. male presenting with recent fall, head injury, intracranial hemorrhage, new onset of jaw movements and staring concerning for recurrent seizures.      CURRENT ANTIEPILEPTIC REGIMEN: levetiracetam increased to 1000 mg bid. Propofol infusion.      TECHNIQUE: 30 channel 24 hrs video electroencephalogram (EEG) was performed in accordance with the international 10-20 system. The study was reviewed in bipolar and referential montages. The recording examined an encephalopathic and or sedated patient.      DESCRIPTION OF THE RECORD:  Asymmetric background with diffuse slowing on the left hemisphere and attenuated background on the right hemisphere. Patient appears sedated with some brief arousals characterized by mild increase in muscle artifact. No seizures or events captured during this study.      ACTIVATION PROCEDURES:   Not performed.      EKG: sampling of the EKG recording demonstrated sinus rhythm.      EVENTS:  None.      INTERPRETATION:  This is an abnormal 24 hrs video EEG recording in sedated and/or encephalopathic patient. An asymmetric background with slowing in the left hemisphere and attenuation of the cerebral electrical activity on the right hemisphere noted. The findings may reflect underlying structural abnormalities. No seizures or events captured during this prolonged study. Clinical and radiological correlation is recommended.     Updates provided to Dr. Lopez.      Dick Blankenship MD   Epilepsy and Neurodiagnostics.   Clinical  of Neurology [facility].   Diplomate in Neurology, Epilepsy, and Electrodiagnostic Medicine.   Office: [phone]  Fax: [phone]

## 2019-04-16 NOTE — PROGRESS NOTES
2 Rn skin check with YURY Salter. Skin noted as follows:     Deep tissue injury to posterior of head. Waffle pillow in place.   Tongue assessed. L side of tongue red, excoriated. Will continue to monitor and reposition ETT q2 with RT.   Nose assessed around cortrak tubing. Blanchable redness noted, tubing repositioned.   Small skin tear under blood pressure cuff and in R axillary region. 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 Blood pressure cuff removed and repositioned.   Heeling scabs/abrasions on bilateral lower extremities.   Sacrum red and purple with both blanchable and non blanchable areas. Mepilex reinforced. Pt on waffle mattress, pillows in use for q2 turns.   Unstageable pressure injury to R heel, black, flaky, heel float boots in place.   Finger assessed under oxygen probe. Fingers rotated.   All lines and tubing positioned off of patients skin and all appropriate preventative measures in place.

## 2019-04-16 NOTE — PROGRESS NOTES
"Trauma / Surgical Daily Progress Note    Date of Service  4/15/2019    Chief Complaint  63 y.o. male admitted 4/10/2019 with Trauma    Interval Events  New seizure  Increasing white count    Review of Systems  Review of Systems   Unable to perform ROS: Intubated        Vital Signs for last 24 hours  Temp:  [36.3 °C (97.4 °F)-37.2 °C (99 °F)] 36.9 °C (98.4 °F)  Pulse:  [100-116] 112  Resp:  [0-25] 13  SpO2:  [96 %-100 %] 98 %    Hemodynamic parameters for last 24 hours    /81   Pulse (!) 112   Temp 36.9 °C (98.4 °F) (Axillary)   Resp 13   Ht 1.803 m (5' 11\")   Wt 70.8 kg (156 lb 1.4 oz)   SpO2 98%   BMI 21.77 kg/m²       Respiratory Data     Respiration: 13, Pulse Oximetry: 98 %     Work Of Breathing / Effort: Vented  RUL Breath Sounds: Clear, RML Breath Sounds: Clear, RLL Breath Sounds: Diminished, ADRIEL Breath Sounds: Clear, LLL Breath Sounds: Diminished    Physical Exam  Physical Exam   Constitutional: He appears well-developed and well-nourished. He appears listless. He is uncooperative. No distress. He is intubated.   HENT:   Mouth/Throat: Oropharynx is clear and moist.   Eyes: Pupils are equal, round, and reactive to light. Conjunctivae are normal. No scleral icterus.   Neck: No tracheal deviation present.   Cardiovascular: Normal rate, regular rhythm and normal pulses.   No extrasystoles are present.   Pulmonary/Chest: No stridor. He is intubated. He has decreased breath sounds in the right lower field and the left lower field. He has no wheezes. He has no rales.   Abdominal: Soft. He exhibits no distension. There is no tenderness.   Genitourinary:   Genitourinary Comments: Suprapubic catheter   Musculoskeletal: He exhibits no edema or deformity.   Neurological: He appears listless. GCS eye subscore is 4. GCS verbal subscore is 1. GCS motor subscore is 5.   No movement left arm, minimal movement other extremities  Not following commands   Skin: Skin is warm and dry. No pallor.   Nursing note and " vitals reviewed.      Laboratory  Recent Results (from the past 24 hour(s))   HEP B SURFACE AB    Collection Time: 04/14/19  6:10 PM   Result Value Ref Range    Hep B Surface Antibody Quant 60.15 (H) 0.00 - 10.00 mIU/mL   ACCU-CHEK GLUCOSE    Collection Time: 04/15/19 12:21 AM   Result Value Ref Range    Glucose - Accu-Ck 204 (H) 65 - 99 mg/dL   ISTAT ARTERIAL BLOOD GAS    Collection Time: 04/15/19  4:06 AM   Result Value Ref Range    Ph 7.499 7.400 - 7.500    Pco2 36.7 26.0 - 37.0 mmHg    Po2 102 (H) 64 - 87 mmHg    Tco2 30 20 - 33 mmol/L    S02 98 93 - 99 %    Hco3 28.5 (H) 17.0 - 25.0 mmol/L    BE 5 (H) -4 - 3 mmol/L    Body Temp 98.0 F degrees    O2 Therapy 30 %    iPF Ratio 340     Ph Temp Birdie 7.504 (H) 7.400 - 7.500    Pco2 Temp Co 36.1 26.0 - 37.0 mmHg    Po2 Temp Cor 100 (H) 64 - 87 mmHg    Specimen Arterial     Action Range Triggered NO     Inst. Qualified Patient YES    ACCU-CHEK GLUCOSE    Collection Time: 04/15/19  5:46 AM   Result Value Ref Range    Glucose - Accu-Ck 166 (H) 65 - 99 mg/dL   Magnesium: Every Monday and Thursday AM    Collection Time: 04/15/19  5:55 AM   Result Value Ref Range    Magnesium 2.2 1.5 - 2.5 mg/dL   Phosphorus: Every Monday and Thursday AM    Collection Time: 04/15/19  5:55 AM   Result Value Ref Range    Phosphorus 1.0 (LL) 2.5 - 4.5 mg/dL   Triglycerides Starting now and then Every 3 Days    Collection Time: 04/15/19  5:55 AM   Result Value Ref Range    Triglycerides 87 0 - 149 mg/dL   Comp Metabolic Panel    Collection Time: 04/15/19  5:55 AM   Result Value Ref Range    Sodium 142 135 - 145 mmol/L    Potassium 4.2 3.6 - 5.5 mmol/L    Chloride 106 96 - 112 mmol/L    Co2 27 20 - 33 mmol/L    Anion Gap 9.0 0.0 - 11.9    Glucose 185 (H) 65 - 99 mg/dL    Bun 49 (H) 8 - 22 mg/dL    Creatinine 2.52 (H) 0.50 - 1.40 mg/dL    Calcium 8.8 8.5 - 10.5 mg/dL    AST(SGOT) 15 12 - 45 U/L    ALT(SGPT) 6 2 - 50 U/L    Alkaline Phosphatase 193 (H) 30 - 99 U/L    Total Bilirubin 1.6 (H) 0.1 -  1.5 mg/dL    Albumin 3.0 (L) 3.2 - 4.9 g/dL    Total Protein 6.8 6.0 - 8.2 g/dL    Globulin 3.8 (H) 1.9 - 3.5 g/dL    A-G Ratio 0.8 g/dL   CBC WITH DIFFERENTIAL    Collection Time: 04/15/19  5:55 AM   Result Value Ref Range    WBC 16.2 (H) 4.8 - 10.8 K/uL    RBC 3.42 (L) 4.70 - 6.10 M/uL    Hemoglobin 10.7 (L) 14.0 - 18.0 g/dL    Hematocrit 33.9 (L) 42.0 - 52.0 %    MCV 99.1 (H) 81.4 - 97.8 fL    MCH 31.3 27.0 - 33.0 pg    MCHC 31.6 (L) 33.7 - 35.3 g/dL    RDW 63.0 (H) 35.9 - 50.0 fL    Platelet Count 295 164 - 446 K/uL    MPV 9.5 9.0 - 12.9 fL    Neutrophils-Polys 84.60 (H) 44.00 - 72.00 %    Lymphocytes 9.10 (L) 22.00 - 41.00 %    Monocytes 4.10 0.00 - 13.40 %    Eosinophils 1.30 0.00 - 6.90 %    Basophils 0.20 0.00 - 1.80 %    Immature Granulocytes 0.70 0.00 - 0.90 %    Nucleated RBC 0.00 /100 WBC    Neutrophils (Absolute) 13.65 (H) 1.82 - 7.42 K/uL    Lymphs (Absolute) 1.47 1.00 - 4.80 K/uL    Monos (Absolute) 0.67 0.00 - 0.85 K/uL    Eos (Absolute) 0.21 0.00 - 0.51 K/uL    Baso (Absolute) 0.04 0.00 - 0.12 K/uL    Immature Granulocytes (abs) 0.12 (H) 0.00 - 0.11 K/uL    NRBC (Absolute) 0.00 K/uL    Anisocytosis 1+     Macrocytosis 1+    ESTIMATED GFR    Collection Time: 04/15/19  5:55 AM   Result Value Ref Range    GFR If  31 (A) >60 mL/min/1.73 m 2    GFR If Non African American 26 (A) >60 mL/min/1.73 m 2   PERIPHERAL SMEAR REVIEW    Collection Time: 04/15/19  5:55 AM   Result Value Ref Range    Peripheral Smear Review see below    PLATELET ESTIMATE    Collection Time: 04/15/19  5:55 AM   Result Value Ref Range    Plt Estimation Normal    MORPHOLOGY    Collection Time: 04/15/19  5:55 AM   Result Value Ref Range    RBC Morphology Present     Poikilocytosis 1+     Ovalocytes 1+     Echinocytes 1+    DIFFERENTIAL COMMENT    Collection Time: 04/15/19  5:55 AM   Result Value Ref Range    Comments-Diff see below    ACCU-CHEK GLUCOSE    Collection Time: 04/15/19 12:21 PM   Result Value Ref Range     Glucose - Accu-Ck 141 (H) 65 - 99 mg/dL       Fluids    Intake/Output Summary (Last 24 hours) at 04/15/19 1720  Last data filed at 04/15/19 1600   Gross per 24 hour   Intake          2447.75 ml   Output              630 ml   Net          1817.75 ml       Core Measures & Quality Metrics  Labs reviewed, Medications reviewed and Radiology images reviewed  Jacob catheter: Urinary Tract Retention or Urinary Tract Obstruction      DVT Prophylaxis: Heparin  DVT prophylaxis - mechanical: SCDs  Ulcer prophylaxis: Yes    Assessed for rehab: Patient unable to tolerate rehabilitation therapeutic regimen    FLORECITA Score  ETOH Screening    Assessment/Plan  Seizures (HCC)   Assessment & Plan    New twitching noted  Sustained seizures on brief EEG  Keppra increased to 1 g every 12  Continuous EEG ordered  Rolan Lopez MD: Neurology     Encounter for evaluation of ability to make decisions regarding care   Assessment & Plan    Patient intubated with severe TBI  Family contacted, awaiting brother to discuss plan of care     Traumatic brain injury (HCC)- (present on admission)   Assessment & Plan    2 adjacent areas of intraparenchymal hemorrhage involving the right frontal lobe with some surrounding vasogenic edema  Bilateral acute subdural hematoma, right greater than left with a large amount of blood in the right middle cranial fossa.  Bilateral acute subarachnoid hemorrhage.  There is right-sided hemispheric edema with 5 mm of right-to-left midline shift.   Anti-seizure prophylaxis, Keppra, x 7 days  4/10 Exam improved to 11T.  Operative therapy and ICP deferred.  4/15 no improvement in neuro exam  New seizures  Keppra increased  Continue serial assessments  Trend sodium  Non-operative management.   Overall, very poor prognosis: Awaiting family for decision making process  Joshua Dsouza MD. Neurosurgery.     Respiratory failure following trauma (HCC)- (present on admission)   Assessment & Plan    Intubated in trauma bay  4/15 mental  status remains primary barrier to weaning   Will need tracheostomy: Need to address plan of care with POA  Continue SICU weaning protocol   Continue ventilator bundle      End stage renal disease (HCC)   Assessment & Plan    Chronic dialysis patient, [facility]  4/11 - nephrology consult, dialyzed 4/12 Tuesday Thursday Saturday dialysis  Dr. [place], nephrology     Contraindication to deep vein thrombosis (DVT) prophylaxis- (present on admission)   Assessment & Plan    Systemic anticoagulation contraindicated secondary to elevated bleeding risk.  4/12 Surveillance venous duplex negative  4/13 prophylactic heparin started     Leukocytosis   Assessment & Plan    4/15 white count up to 16.2: Trend  Blood cultures and sputum cultures sent  Cefepime and vancomycin initiated  Trend cultures     Closed fracture of skull (HCC)- (present on admission)   Assessment & Plan    Acute mildly displaced left frontal bone skull fracture.  Non-operative management.  Joshua Dsouza MD. Neurosurgery.     History of suprapubic catheter- (present on admission)   Assessment & Plan    Present prior to arrival      Trauma- (present on admission)   Assessment & Plan    [facility] suspect ground level fall, transported to [facility].  Initial non-trauma activation. Upgrade to trauma red after CT imaging demonstrated a traumatic brain injury and skull fracture  Dawit Schreiber MD. Trauma Surgery.       CRITICAL CARE DECISION MAKING:    Patiens examined with team at bedside, care discussed with team    Addressed pulmonary hygiene concerns as well as oxygenation/ventilation.  Adjustments made to ventilator oxygen and pressure settings to facilitate weaning protocol based on ABG results    Worsening leukocytosis: Cultures obtained and anti-    New seizures: Bolus Keppra and change dosing to twice daily.  Discussed with neurology -awaiting formal consultation.  Stat EEG with ongoing seizure activity.  24-hour EEG  requested.    Labs reviewed, electrolytes addressed, renal function assessed  Reviewed nutrition strategies, recent indices  Addressed GI prophylaxis and bowel frequency  Assessed/discussed/titrated analgesics and need for sedatives  Addressed DVT prophylaxis  Addressed line days, mora catheter days, access needs  Addressed family and discharge concerns      Discussed patient condition with RN, RT, Pharmacy, Dietary,  and neurology.  CRITICAL CARE TIME EXCLUDING PROCEDURES:38  minutes

## 2019-04-16 NOTE — CARE PLAN
Problem: Ventilation Defect:  Goal: Ability to achieve and maintain unassisted ventilation or tolerate decreased levels of ventilator support  Outcome: PROGRESSING AS EXPECTED     Ventilator Daily Summary    Vent Day # 7    Ventilator settings this shift: CMV 14 450 peep 8 30%    Weaning trials: SBT x 1 pt tolerated well, placed back on post 1 hr per protocol      Plan: Continue current ventilator settings and wean mechanical ventilation as tolerated per physician orders.

## 2019-04-16 NOTE — CONSULTS
"BRIEF NEUROLOGY CONSULT UPDATE NOTE:    63 yr old with severe TBI suffering multiple brain hemorrhages mostly in the R frontal lobe with decreased consciousness.  EEG done for seizure like L eye deviation, staring, left jaw chewing movements found to be corresponding w/ seizure per Dr. Blankenship.  Given his severe brain injury w/ bleed, it is not surprising that he may be having focal seizures.     Recommendations:   - Increased KEppra to 1000 mg BID.   - Ordered continuous video EEG. Due to lack of machines, it cannot be done now. Once a machine becomes available, the EEG tech will connect, possible tomorrow morning.   - I will further evaluate the patient tomorrow.     The above was discussed with Dr. Dawit Schreiber M.D.    Rosa Lopez MD  Acute Neurology Consultant  Tiger Text ID \"Rolan Lopez\"    "

## 2019-04-16 NOTE — PROGRESS NOTES
HD treatment today per routine order using LUAAVF.Treatment tolerated well.Net UF removed 1L.Report given to primary Rn.

## 2019-04-16 NOTE — PROGRESS NOTES
[facility] Nephrology Daily Progress Note    Date of Service  4/16/2019    Chief Complaint  63-year-old male who has end-stage renal   disease and is on chronic hemodialysis on Tuesdays, Thursdays and Saturdays at   [facility].  The patient dialyzes for 4 hours.  [facility].  He was transferred to [facility] and had a CT of the brain that revealed mildly displaced left   frontal skull fracture and intraparenchymal hemorrhage with edema and   bilateral subdural hematomas.  The patient had worsening respiratory status   after the CT and required intubation and mechanical ventilation.      Interval Problem Update  04/11/19 - Consult done.no HD today.  04/12/19 - No new events.Ventilated. mL.  04/13/19 - Intubated.  In ICU.  No new events.  04/14/19 - Intubated.  In ICU.  No new events.  Dialysis today.  04/15/19 - No new overnight renal events. Right cerebellar hemispheric edema which has significantly improved.  04/16/19 - HD today. No changes overnight. Net UF removed 1L.    Review of Systems  Review of Systems   Unable to perform ROS: Intubated     Physical Exam  Temp:  [36.3 °C (97.4 °F)-36.9 °C (98.4 °F)] 36.9 °C (98.4 °F)  Pulse:  [100-112] 106  Resp:  [12-24] 14  SpO2:  [98 %-100 %] 100 %    Physical Exam   Constitutional: He appears well-developed and well-nourished.   Ventilated   HENT:   Right Ear: External ear normal.   Left Ear: External ear normal.   Skull Fx  JOANIE  Nasal feeding tube   Eyes: Conjunctivae are normal. Right eye exhibits no discharge. Left eye exhibits no discharge.   Neck: Normal range of motion. Neck supple. No thyromegaly present.   Cardiovascular: Normal rate and regular rhythm.  Exam reveals no friction rub.    Murmur heard.  Pulmonary/Chest: Breath sounds normal. He has no wheezes. He has no rales.   Ventilated   Abdominal: Soft. Bowel sounds are normal. He exhibits no distension and no mass. There  is no tenderness. There is no guarding.   Genitourinary:   Genitourinary Comments: Suprapubic catheter   Musculoskeletal:   Trace edema LE's  LUE AVF  R heel ulcer   Lymphadenopathy:     He has cervical adenopathy.   Neurological:   No interaction   Skin: Skin is warm and dry. No rash noted. No erythema.   Nursing note and vitals reviewed.      Fluids    Intake/Output Summary (Last 24 hours) at 04/16/19 0944  Last data filed at 04/16/19 0900   Gross per 24 hour   Intake          2610.63 ml   Output             1261 ml   Net          1349.63 ml       Laboratory  Recent Labs      04/14/19   0520  04/15/19   0555  04/16/19   0400   WBC  15.2*  16.2*  15.7*   RBC  3.47*  3.42*  3.16*   HEMOGLOBIN  10.8*  10.7*  9.7*   HEMATOCRIT  33.4*  33.9*  31.5*   MCV  96.3  99.1*  99.7*   MCH  31.1  31.3  30.7   MCHC  32.3*  31.6*  30.8*   RDW  59.7*  63.0*  62.5*   PLATELETCT  253  295  298   MPV  9.3  9.5  9.5     Recent Labs      04/14/19   0520  04/15/19   0555  04/16/19   0400   SODIUM  143  142  146*   POTASSIUM  3.7  4.2  4.4   CHLORIDE  105  106  112   CO2  28  27  27   GLUCOSE  250*  185*  220*   BUN  53*  49*  75*   CREATININE  3.15*  2.52*  2.95*   CALCIUM  8.7  8.8  8.0*             Recent Labs      04/15/19   0555   TRIGLYCERIDE  87       PMH/FH/SH reviewed    IMPRESSION:  #  End-stage renal disease, on chronic hemodialysis Tuesdays, Thursdays and   Saturdays.  #  Status post traumatic intracerebral bleed with bilateral subdural   hematomas as well.  He has mildly displaced left frontal skull fracture.  #  Ventilator-dependent respiratory failure.  #  Diabetes mellitus.Per primary team.  #  History of hypertension and also history of hypotension with orthostasis,   on midodrine.  #  Anemia of chronic kidney disease, iron repleted.Hb above target  #  Chronic kidney disease -- Metabolic bone disorder, most recent vitamin D   is normal.  PTH has been suppressed.  He was on calcitriol in the past.  PO4 normal.  #  History  of penile necrosis.  #  Status post suprapubic catheter.  #  History of atrial fibrillation, previously on amiodarone.    #  Functional left upper extremity AV fistula.  #  Hypoalbuminemia.  #  Elevated bilirubin.  #  R heel ulcer  #  Leukocytosis. ? Reactive    PLAN:  #  Dialysis today.         #  Follow his labs.  #  He should be on no dietary protein restrictions.  #  He does not need Epogen at this time.  #  All of his medicines should be dosed for GFR less than 10.  #  On enteral feedings. No phosphorus restrictions.  #  Recheck serum phos and supplement.  #  On salt tablets for high intracranial pressure

## 2019-04-16 NOTE — ASSESSMENT & PLAN NOTE
New twitching noted  Sustained seizures on brief EEG  Keppra increased to 1 g every 12h  4/17 24hr EEG without seizure activity  TCD without vasospasm  Rolan Lopez MD: Neurology

## 2019-04-16 NOTE — CONSULTS
Chief Complaint   Patient presents with   • Syncope   • Altered Mental Status       Problem List Items Addressed This Visit     None      Inpatient Neurology Consult    History of present illness:  This is a 63-year old male with PMhx significant for ESRD (on chronic HD T/Th/Sat) who presented to [facility] on 4/10/19 as a transfer from the [facility], where patient was apparently found down and with altered mental status. Patient found to have Bifrontal (most significant to the Right) contusions/intraparenchymal hemorrhage with associated vasogenic edema, also with bilateral subdural hematoma. Patient has been closely followed by neurosurgery; no surgical intervention. Over the last several days, appeared to be mildly improving, still with minimal purposeful interaction, not following commands. Last night, patient was witnessed to have rhythmic mouth and eye movement x 1 minute, witnessed by nursing. Given Keppra 500 mg IVPB at that time. No additional seizure like activity witnessed/reported per nursing. At time of my interview/exam, patient is intubated, not on sedation. Does not follow commands which is reportedly his baseline. Eyes midline. Responsive to nox stim only. Further ROS is thus limited.     Neurology has been consulted by Dr. Dawit Schreiber to further evaluate the findings noted above.     Past medical history:   No past medical history on file.    Past surgical history:   No past surgical history on file.    Family history:   No family history on file.    Social history:   Social History     Social History   • Marital status: N/A     Spouse name: N/A   • Number of children: N/A   • Years of education: N/A     Occupational History   • Not on file.     Social History Main Topics   • Smoking status: Not on file   • Smokeless tobacco: Not on file   • Alcohol use Not on file   • Drug use: Unknown   • Sexual activity: Not on file     Other Topics Concern   • Not on file     Social History  Narrative   • No narrative on file       Current medications:   Current Facility-Administered Medications   Medication Dose   • MD Alert...Vancomycin per Pharmacy     • ceFEPIme (MAXIPIME) 1 g in  mL IVPB  1 g   • levETIRAcetam (KEPPRA) tablet 1,000 mg  1,000 mg   • insulin glargine (LANTUS) injection 10 Units  10 Units   • insulin regular (HUMULIN R) injection 3-14 Units  3-14 Units    And   • dextrose 50% (D50W) injection 25 mL  25 mL   • heparin injection 5,000 Units  5,000 Units   • sodium chloride (SALT) tablet 2 g  2 g   • docusate sodium 100mg/10mL (COLACE) solution 100 mg  100 mg   • Pharmacy Consult: Enteral tube insertion - review meds/change route/product selection  1 Each   • Respiratory Care per Protocol     • Pharmacy Consult Request ...Pain Management Review 1 Each  1 Each   • senna-docusate (PERICOLACE or SENOKOT S) 8.6-50 MG per tablet 1 Tab  1 Tab   • senna-docusate (PERICOLACE or SENOKOT S) 8.6-50 MG per tablet 1 Tab  1 Tab   • polyethylene glycol/lytes (MIRALAX) PACKET 1 Packet  1 Packet   • magnesium hydroxide (MILK OF MAGNESIA) suspension 30 mL  30 mL   • bisacodyl (DULCOLAX) suppository 10 mg  10 mg   • fleet enema 133 mL  1 Each   • morphine (pf) 4 mg/ml injection 2 mg  2 mg   • ondansetron (ZOFRAN) syringe/vial injection 4 mg  4 mg   • famotidine (PEPCID) tablet 20 mg  20 mg    Or   • famotidine (PEPCID) injection 20 mg  20 mg   • propofol (DIPRIVAN) injection  0-80 mcg/kg/min       Medication Allergy:  Not on File    Review of systems:   Unable to obtain given patient's altered mentation.     Physical examination:   Vitals:    04/16/19 0500 04/16/19 0600 04/16/19 0609 04/16/19 0954   BP:       Pulse: (!) 104 (!) 106     Resp: (!) 23 14     Temp:       TempSrc:  Jacob     SpO2: 98% 100% 100% 100%   Weight:       Height:         General: Patient in no acute distress  HEENT: Normocephalic, no signs of acute trauma.   Neck: supple, no meningeal signs or carotid bruits. There is normal  range of motion. No tenderness on exam.   Chest: clear to auscultation. No cough.   CV: RRR, no murmurs.   Skin: no signs of acute rashes or trauma.   Musculoskeletal: joints exhibit full range of motion, without any pain to palpation. There are no signs of joint or muscle swelling. There is no tenderness to deep palpation of muscles.   Psychiatric: No hallucinatory behavior.     NEUROLOGICAL EXAM:   Mental status, orientation: Appears awake, however does not acknowledge/reguard examiner  Speech and language: No verbal output, does not follow commands.   Cranial nerve exam: Pupils are 3-4 mm bilaterally and equally reactive to light and accommodation. Visual fields are intact by confrontation/visual threat. Corneal reflexes fully intact bilaterally. Positive cough/gag reflex.  Face appears symmetric. Tongue is midline and without any signs of tongue biting or fasciculations.  Motor/Sensory exam:  No spontaneous or voluntary motor activity witnessed by me; no abnormal movements. Withdrawal to nox stim x 4, does not localize.    Deep tendon reflexes:  2+ throughout. Plantar responses are flexor. There is no clonus.   Coordination: Deferred as patient is unable to participate.   Gait: Not assessed at this time.     ANCILLARY DATA REVIEWED:     Lab Data Review:  Recent Results (from the past 24 hour(s))   BLOOD CULTURE    Collection Time: 04/15/19 11:31 AM   Result Value Ref Range    Significant Indicator NEG     Source BLD     Site PERIPHERAL     Blood Culture       No Growth    Note: Blood cultures are incubated for 5 days and  are monitored continuously.Positive blood cultures  are called to the RN and reported as soon as  they are identified.     BLOOD CULTURE    Collection Time: 04/15/19 11:47 AM   Result Value Ref Range    Significant Indicator NEG     Source BLD     Site PERIPHERAL     Blood Culture       No Growth    Note: Blood cultures are incubated for 5 days and  are monitored continuously.Positive blood  cultures  are called to the RN and reported as soon as  they are identified.     ACCU-CHEK GLUCOSE    Collection Time: 04/15/19 12:21 PM   Result Value Ref Range    Glucose - Accu-Ck 141 (H) 65 - 99 mg/dL   MRSA BY PCR (AMP)    Collection Time: 04/15/19  3:50 PM   Result Value Ref Range    Significant Indicator NEG     Source RESP     Site NARES     MRSA PCR Negative for MRSA by PCR.    ACCU-CHEK GLUCOSE    Collection Time: 04/15/19  5:03 PM   Result Value Ref Range    Glucose - Accu-Ck 147 (H) 65 - 99 mg/dL   GRAM STAIN    Collection Time: 04/15/19  5:46 PM   Result Value Ref Range    Significant Indicator .     Source RESP     Site TRACHEAL ASPIRATE     Gram Stain Result       Many WBCs.  Few Gram negative rods.  Few Gram negative cocci.  Few Gram positive rods.  Moderate Gram positive cocci.     URINALYSIS    Collection Time: 04/15/19  5:55 PM   Result Value Ref Range    Color Yellow     Character Cloudy (A)     Specific Gravity 1.020 <1.035    Ph 7.0 5.0 - 8.0    Glucose Negative Negative mg/dL    Ketones Trace (A) Negative mg/dL    Protein 100 (A) Negative mg/dL    Bilirubin Small (A) Negative    Urobilinogen, Urine 4.0 Negative    Nitrite Negative Negative    Leukocyte Esterase Large (A) Negative    Occult Blood Large (A) Negative    Micro Urine Req Microscopic    URINE MICROSCOPIC (W/UA)    Collection Time: 04/15/19  5:55 PM   Result Value Ref Range    WBC 5-10 (A) /hpf    RBC 2-5 (A) /hpf    Bacteria Few (A) None /hpf    Epithelial Cells Few /hpf    Amorphous Crystal Present /hpf   ACCU-CHEK GLUCOSE    Collection Time: 04/16/19 12:18 AM   Result Value Ref Range    Glucose - Accu-Ck 196 (H) 65 - 99 mg/dL   CBC WITH DIFFERENTIAL    Collection Time: 04/16/19  4:00 AM   Result Value Ref Range    WBC 15.7 (H) 4.8 - 10.8 K/uL    RBC 3.16 (L) 4.70 - 6.10 M/uL    Hemoglobin 9.7 (L) 14.0 - 18.0 g/dL    Hematocrit 31.5 (L) 42.0 - 52.0 %    MCV 99.7 (H) 81.4 - 97.8 fL    MCH 30.7 27.0 - 33.0 pg    MCHC 30.8 (L) 33.7 -  35.3 g/dL    RDW 62.5 (H) 35.9 - 50.0 fL    Platelet Count 298 164 - 446 K/uL    MPV 9.5 9.0 - 12.9 fL    Neutrophils-Polys 82.40 (H) 44.00 - 72.00 %    Lymphocytes 10.00 (L) 22.00 - 41.00 %    Monocytes 4.30 0.00 - 13.40 %    Eosinophils 1.70 0.00 - 6.90 %    Basophils 0.60 0.00 - 1.80 %    Immature Granulocytes 1.00 (H) 0.00 - 0.90 %    Nucleated RBC 0.00 /100 WBC    Neutrophils (Absolute) 12.92 (H) 1.82 - 7.42 K/uL    Lymphs (Absolute) 1.56 1.00 - 4.80 K/uL    Monos (Absolute) 0.68 0.00 - 0.85 K/uL    Eos (Absolute) 0.26 0.00 - 0.51 K/uL    Baso (Absolute) 0.10 0.00 - 0.12 K/uL    Immature Granulocytes (abs) 0.15 (H) 0.00 - 0.11 K/uL    NRBC (Absolute) 0.00 K/uL   Basic Metabolic Panel    Collection Time: 04/16/19  4:00 AM   Result Value Ref Range    Sodium 146 (H) 135 - 145 mmol/L    Potassium 4.4 3.6 - 5.5 mmol/L    Chloride 112 96 - 112 mmol/L    Co2 27 20 - 33 mmol/L    Glucose 220 (H) 65 - 99 mg/dL    Bun 75 (HH) 8 - 22 mg/dL    Creatinine 2.95 (H) 0.50 - 1.40 mg/dL    Calcium 8.0 (L) 8.5 - 10.5 mg/dL    Anion Gap 7.0 0.0 - 11.9   ESTIMATED GFR    Collection Time: 04/16/19  4:00 AM   Result Value Ref Range    GFR If  26 (A) >60 mL/min/1.73 m 2    GFR If Non African American 22 (A) >60 mL/min/1.73 m 2       Labs reviewed by me.     Imaging reviewed by me:     DX-CHEST-PORTABLE (1 VIEW)   Final Result         1.  Pulmonary edema and/or infiltrates are identified, which are stable since the prior exam.   2.  Cardiomegaly   3.  Atherosclerosis      CT-HEAD W/O   Final Result         1.  Frontal hemorrhages, stable since prior.   2.  Bilateral subdural hematomas, appears stable since prior given difference of technique.   3.  Left frontal hemorrhagic contusion versus subarachnoid hemorrhage, stable.   4.  Right parafalcine subdural hematoma posteriorly, stable.   5.  Scattered right subarachnoid hemorrhages, similar to prior study.   6.  Atherosclerosis      DX-CHEST-PORTABLE (1 VIEW)   Final  Result         1.  Pulmonary edema and/or infiltrates are identified, which are stable since the prior exam.      DX-CHEST-PORTABLE (1 VIEW)   Final Result      Stable enlargement of the cardiomediastinal silhouette, mild diffuse interstitial edema and bilateral lower lobe atelectasis and/or consolidation.         DX-CHEST-PORTABLE (1 VIEW)   Final Result      Stable enlargement of the cardiomediastinal silhouette, mild diffuse interstitial edema and bilateral lower lobe atelectasis and/or consolidation.      CT-HEAD W/O   Final Result      1.  Stable bilateral frontal lobe intraparenchymal hemorrhage.   2.  Stable bilateral subdural hematomas, right greater than left.   3.  Grossly unchanged subarachnoid hemorrhage.   4.  Left calvarial fracture.      US-TRAUMA VEIN SCREEN LOWER BILAT EXTREMITY   Final Result      DX-CHEST-PORTABLE (1 VIEW)   Final Result      No significant change      DX-ABDOMEN FOR TUBE PLACEMENT   Final Result      Feeding tube tip at the mid stomach.      DX-CHEST-PORTABLE (1 VIEW)   Final Result      1.  Left-sided rib fractures.   2.  Stable lines and tubes.      CT-HEAD W/O   Final Result      1.  Stable bilateral frontal parenchymal hemorrhages consistent with hemorrhagic contusions.   2.  Stable subarachnoid hemorrhage.   3.  Stable bilateral subdural hemorrhages.   4.  Left frontal bone fracture.      CT-CSPINE WITHOUT PLUS RECONS   Final Result      1.  No acute fractures identified.      2.  Moderate to severe degenerative disc disease at C5-6 and C6-7.      3.  Small right pleural effusion      4.  Interlobular septal thickening is suggestive of pulmonary edema.      5.  Atherosclerosis      DX-CHEST-LIMITED (1 VIEW)   Final Result      1.  Endotracheal tube tip projects approximately 3.3 cm above the dustin.      2.  Exam is otherwise not significantly changed.      CT-HEAD W/O   Final Result      1.  There is an acute mildly displaced left frontal bone skull fracture.   2.  There  are 2 adjacent areas of intraparenchymal hemorrhage involving the right frontal lobe. There is some surrounding vasogenic edema surrounding the larger area suggesting this may be subacute but the other area appears acute.   3.  There is bilateral acute subdural hematoma, right greater than left with a large amount of blood in the right middle cranial fossa.   4.  There is bilateral acute subarachnoid hemorrhage.   5.  There is right-sided hemispheric edema with 5 mm of right-to-left midline shift.   6.  There is diffuse atrophy.      Findings were discussed with LEONILA FRIAS on 4/10/2019 at 2:11 PM.      DX-CHEST-PORTABLE (1 VIEW)   Final Result      1.  There is no acute cardiopulmonary process.      US-INTRACRANIAL ARTERY COMP    (Results Pending)       ASSESSMENT AND PLAN:  63-year old male with PMhx significant for ESRD (on chronic HD T/Th/Sat) who presented to [facility] on 4/10/19 as a transfer from the [facility], where patient was apparently found down and with altered mental status; found to have relatively large Right frontal IPH with associated bilateral SDH. Patient had been stabilized; remains intubated, off sedation. Not following commands. Nursing witnessed a 1-minute duration episode of rhythmic mouth and eye movements; no witnessed or reported episodes since that time. Received 500 mg Keppra IVPB. Event concerning for seizure, and given recent trauma/TBI/ICH, patient has and will remain at risk for associated seizures. Will recommend to continue Keppra 1g BID; plan for EEG monitoring and seizure precautions.     Recommendations/Plan:     -q4h and PRN neuro assessment. VS per nursing/unit protocol.   -Plan for cEEG monitoring x 24 hours once EEG equipment becomes available.   -Seizure precautions; Ativan 1 mg IV PRN for breakthrough/GTCS. Please notify neurology with additional seizures.   -For now, continue Keppra 1000 mg BID. Will determine if need for additional AEDs depending on  results of EEG.   -Patient receiving Cefepime empirically for leukocytosis; recommend discontinued use as this lowers seizure threshold, Pharm consultation to recommend other antibiotic coverage.   -All other medical management per primary team and other consultants (Nephrology).   -DVT Prophylaxis: SCDs.     The plan of care above has been discussed with Dr. Lopez.     Tyra Kong MSN, BSN, AGNP-C  [facility]

## 2019-04-16 NOTE — ASSESSMENT & PLAN NOTE
4/15 WBC 16.2, Blood cultures and sputum cultures sent, cefepime and vancomycin initiated  4/17 De-escalation to Ancef for MSSA in sputum  Course to complete 4/22

## 2019-04-17 NOTE — PROGRESS NOTES
2 RN skin check complete with Titus BAINS RN    Areas of concern:  Sacrum - Red / discolored, mepilex in place  EEG cables / head - Unable to assess under EEG cords and back of head as he is currently getting continuous EEG; will address with neurology when we can d/c.    BLE - existing pressure ulcers on right heel, abrasion to left big toe, generalized poor turger, bruising and abrasions   Back - spot on back of what may be a fat deposit, mepilex placed over back to reduce friction risk    Interventions in place such as:   · q2 hour turns  · Keeping skin clean and dry  · Use of products such as barrier wipes/cream  · Full body waffle cushion: IN USE  · Rotating ET tube q2h (in coordination with RTs)  · All tubes including suprapubic cathether protected from skin by drain sponge and frequent assessments  · Wound following patient and all interventions in place

## 2019-04-17 NOTE — DISCHARGE PLANNING
Care Transition Team Assessment  In the case of an emergency, pt's legal NOK is brothhosea Fernandez /[phone]    RNCM met with pt's brother, Jim at bedside and obtained the information used in this assessment along with chart review. According to brothhosea, pt very private and is closer to another brother Ezequiel who lives in [place], Ezequiel currently in Rehab in [facility] and unable to be reached at this time. Pt was completely independent in ADLS/IADLS. Pt goes to hemodialysis  Tu[facility]Th-Sa. Pt has a limited support system.  Unknonw if ppt has hx of substance abuse or MH    Information Source brother and chart  Orientation : Unable to Assess  Information Given By: Other (Comments)  Informant's Name: chart review  Who is responsible for making decisions for patient? : Patient         Elopement Risk  Legal Hold: No  Ambulatory or Self Mobile in Wheelchair: No-Not an Elopement Risk  Elopement Risk: Not at Risk for Elopement    Interdisciplinary Discharge Planning  Does Admitting Nurse Feel This Could be a Complex Discharge?: Yes  [facility]  Lives with - Patient's Self Care Capacity: Alone and Able to Care For Self  Patient or legal guardian wants to designate a caregiver (see row info): No  Support Systems: Family Member(s)  Housing / Facility: Unable To Determine At This Time  Do You Take your Prescribed Medications Regularly: Yes  Prior Services: Home With Outpatient Therapy    Discharge Preparedness  What is your plan after discharge?: Uncertain - pending medical team collaboration  What are your discharge supports?: Sibling  Prior Functional Level: Ambulatory, Independent with Activities of Daily Living, Independent with Medication Management  Difficulity with ADLs: None  Difficulity with IADLs: None    Functional Assesment  Prior Functional Level: Ambulatory, Independent with Activities of Daily Living, Independent with Medication Management    Finances  Prescription Coverage: Yes               Advance Directive  Advance Directive?: None, Clinically incapacitated / Inappropriate                   Anticipated Discharge Information  Anticipated discharge disposition: Discharge needs currently unknown

## 2019-04-17 NOTE — PROGRESS NOTES
2 RN skin check completed.  Unstageable pressure injury to R heel, heel float boot in place.  Sacrum red and purple and not blanching, mepilex in place.  Unable to assess DTI to back of head d/t EEG wrap, waffle cushion in place.

## 2019-04-17 NOTE — PROGRESS NOTES
"BRIEF NEUROLOGY CONSULT UPDATE NOTE:    Since the increase in Keppra, no further clinical twitches noted. No true subclinical seizures noted on EEGs either.     OK to discontinue EEG after 24 hrs.   Continue Keppra.   Neurology has no further rec.  TBI care per trauma.     Neurology signing off. Thank you for the consultation. Please feel free to call back w/ any further questions.      Rosa Lopez MD  Acute Neurology Consultant  Tiger Text ID \"Rolan Lopez\"    "

## 2019-04-17 NOTE — PROGRESS NOTES
[facility] Nephrology Daily Progress Note    Date of Service  4/17/2019    Chief Complaint  63-year-old male who has end-stage renal   disease and is on chronic hemodialysis on Tuesdays, Thursdays and Saturdays at   [facility].  The patient dialyzes for 4 hours.  [facility]  He was transferred to [facility] and had a CT of the brain that revealed mildly displaced left   frontal skull fracture and intraparenchymal hemorrhage with edema and   bilateral subdural hematomas.  The patient had worsening respiratory status   after the CT and required intubation and mechanical ventilation.      Interval Problem Update  04/11/19 - Consult done.no HD today.  04/12/19 - No new events.Ventilated. mL.  04/13/19 - Intubated.  In ICU.  No new events.  04/14/19 - Intubated.  In ICU.  No new events.  Dialysis today.  04/15/19 - No new overnight renal events. Right cerebellar hemispheric edema which has significantly improved.  04/16/19 - HD today. No changes overnight. Net UF removed 1L.  04/17/19 - No new events overnight from renal standpoint.     Review of Systems  Review of Systems   Unable to perform ROS: Intubated     Physical Exam  Pulse:  [] 105  Resp:  [13-23] 23  SpO2:  [98 %-100 %] 98 %    Physical Exam   Constitutional: He appears well-developed and well-nourished.   Ventilated   HENT:   Right Ear: External ear normal.   Left Ear: External ear normal.   Skull Fx  JOANIE  Nasal feeding tube   Eyes: Conjunctivae are normal. Right eye exhibits no discharge. Left eye exhibits no discharge.   Neck: Normal range of motion. Neck supple. No thyromegaly present.   Cardiovascular: Normal rate and regular rhythm.  Exam reveals no friction rub.    Murmur heard.  Pulmonary/Chest: Breath sounds normal. He has no wheezes. He has no rales.   Ventilated   Abdominal: Soft. Bowel sounds are normal. He exhibits no distension and no mass. There is no  tenderness. There is no guarding.   Genitourinary:   Genitourinary Comments: Suprapubic catheter   Musculoskeletal:   Trace edema LE's  LUE AVF  R heel ulcer   Lymphadenopathy:     He has cervical adenopathy.   Neurological:   No interaction   Skin: Skin is warm and dry. No rash noted. No erythema.   Nursing note and vitals reviewed.      Fluids    Intake/Output Summary (Last 24 hours) at 04/17/19 1101  Last data filed at 04/17/19 0800   Gross per 24 hour   Intake           2250.2 ml   Output              246 ml   Net           2004.2 ml       Laboratory  Recent Labs      04/15/19   0555  04/16/19   0400  04/17/19   0500   WBC  16.2*  15.7*  13.2*   RBC  3.42*  3.16*  2.99*   HEMOGLOBIN  10.7*  9.7*  9.3*   HEMATOCRIT  33.9*  31.5*  29.4*   MCV  99.1*  99.7*  98.3*   MCH  31.3  30.7  31.1   MCHC  31.6*  30.8*  31.6*   RDW  63.0*  62.5*  62.1*   PLATELETCT  295  298  305   MPV  9.5  9.5  9.4     Recent Labs      04/15/19   0555  04/16/19   0400  04/17/19   0500   SODIUM  142  146*  140   POTASSIUM  4.2  4.4  4.2   CHLORIDE  106  112  103   CO2  27  27  28   GLUCOSE  185*  220*  154*   BUN  49*  75*  71*   CREATININE  2.52*  2.95*  2.45*   CALCIUM  8.8  8.0*  8.0*             Recent Labs      04/15/19   0555   TRIGLYCERIDE  87       PMH/FH/SH reviewed    IMPRESSION:  #  End-stage renal disease, on chronic hemodialysis Tuesdays, Thursdays and   Saturdays.  #  Status post traumatic intracerebral bleed with bilateral subdural   hematomas as well.  He has mildly displaced left frontal skull fracture.  #  Ventilator-dependent respiratory failure.  #  Diabetes mellitus.Per primary team.  #  History of hypertension and also history of hypotension with orthostasis,   on midodrine.  #  Anemia of chronic kidney disease, iron repleted.Hb above target  #  Chronic kidney disease -- Metabolic bone disorder, most recent vitamin D   is normal.  PTH has been suppressed.  He was on calcitriol in the past.  PO4 normal.  #  History of  penile necrosis.  #  Status post suprapubic catheter.  #  History of atrial fibrillation, previously on amiodarone.    #  Functional left upper extremity AV fistula.  #  Hypoalbuminemia.  #  Elevated bilirubin.  #  R heel ulcer  #  Leukocytosis. ? Reactive    PLAN:  #  No plans for Dialysis today. Next treatment is tomorrow.         #  He should be on no dietary protein restrictions.  #  All of his medicines should be dosed for GFR less than 10.  #  On enteral feedings. No phosphorus restrictions.  #  Recheck serum phos and supplement.  #  On salt tablets for high intracranial pressure

## 2019-04-17 NOTE — PROCEDURES
VIDEO ELECTROENCEPHALOGRAM REPORT        Referring provider: Dr. Lopez.      DOS:  4/17/2019 (total recording of 6 hours and 16 minutes).      INDICATION:  Lito Coronado 63 y.o. male presenting with recent fall, head injury, intracranial hemorrhage, new onset of jaw movements and staring concerning for recurrent seizures.      CURRENT ANTIEPILEPTIC REGIMEN: levetiracetam increased to 1000 mg bid. Propofol infusion.      TECHNIQUE: 30 channel extended video electroencephalogram (EEG) was performed in accordance with the international 10-20 system. The study was reviewed in bipolar and referential montages. The recording examined an encephalopathic patient.      DESCRIPTION OF THE RECORD:  Asymmetric background with diffuse slowing on the left hemisphere and attenuated background on the right hemisphere.      ACTIVATION PROCEDURES:   Not performed.      EKG: sampling of the EKG recording demonstrated sinus rhythm.      EVENTS:  None.      INTERPRETATION:  This is an abnormal extended video EEG recording in an encephalopathic patient. An asymmetric background with slowing in the left hemisphere and attenuation of the cerebral electrical activity on the right hemisphere noted. The findings may reflect underlying structural abnormalities. No seizures or events captured during this prolonged study. Clinical and radiological correlation is recommended.     Updates provided to Dr. Lopez.      Dick Blankenship MD   Epilepsy and Neurodiagnostics.   Clinical  of Neurology [employer].   Diplomate in Neurology, Epilepsy, and Electrodiagnostic Medicine.   Office: [phone]  Fax: [phone]

## 2019-04-17 NOTE — CARE PLAN
Problem: Ventilation Defect:  Goal: Ability to achieve and maintain unassisted ventilation or tolerate decreased levels of ventilator support  Outcome: PROGRESSING AS EXPECTED  Adult Ventilation Update    Total Vent Days: 8    Patient Lines/Drains/Airways Status    Active Airway     Name: Placement date: Placement time: Site: Days:    Airway ETT 8.0 04/10/19   1409      7              Plateau Pressure (Q Shift): 16 (04/16/19 1352)  Static Compliance (ml / cm H2O): 53 (04/17/19 0222)    Sputum/Suction   Cough: Strong (04/17/19 0400)  Sputum Amount: Small (04/17/19 0400)  Sputum Color: White;Clear (04/17/19 0400)  Sputum Consistency: Thick;Thin (04/17/19 0400)    Events/Summary/Plan: Pt stable on vent over night as charted, no changes made.

## 2019-04-17 NOTE — CARE PLAN
Problem: Ventilation Defect:  Goal: Ability to achieve and maintain unassisted ventilation or tolerate decreased levels of ventilator support  Outcome: PROGRESSING SLOWER THAN EXPECTED  Vent day 8 no SBT.

## 2019-04-17 NOTE — CARE PLAN
Problem: Safety  Goal: Will remain free from injury  Seizure precautions in place.     Problem: Skin Integrity  Goal: Risk for impaired skin integrity will decrease  Alireza skin risk assessment and complete skin assessment completed at beginning of shift. Pt is turned and repositioned q2h and PRN. Appropriate wound protocols in place. Pt placed on waffle mattress.

## 2019-04-17 NOTE — PROGRESS NOTES
"Trauma / Surgical Daily Progress Note    Date of Service  4/16/2019    Chief Complaint  63 y.o. male admitted 4/10/2019 with Trauma    Interval Events  No further seizure  White count down slightly  Afebrile  Tolerating feeding    Review of Systems  Review of Systems     Vital Signs for last 24 hours  Pulse:  [] 100  Resp:  [12-23] 16  SpO2:  [97 %-100 %] 100 %    Hemodynamic parameters for last 24 hours    /81   Pulse 100   Temp 36.9 °C (98.4 °F) (Axillary)   Resp 16   Ht 1.803 m (5' 11\")   Wt 70.8 kg (156 lb 1.4 oz)   SpO2 100%   BMI 21.77 kg/m²   \  Respiratory Data     Respiration: 16, Pulse Oximetry: 100 %     Work Of Breathing / Effort: Vented  RUL Breath Sounds: Clear, RML Breath Sounds: Clear, RLL Breath Sounds: Diminished, ADRIEL Breath Sounds: Clear, LLL Breath Sounds: Diminished    Physical Exam  Physical Exam   Constitutional: He appears well-developed and well-nourished. He appears listless. He is uncooperative. No distress. He is intubated.   HENT:   Mouth/Throat: Oropharynx is clear and moist.   Eyes: Pupils are equal, round, and reactive to light. Conjunctivae are normal. No scleral icterus.   Neck: No tracheal deviation present.   Cardiovascular: Normal rate, regular rhythm and normal pulses.    Pulmonary/Chest: No stridor. He is intubated. He has decreased breath sounds in the right lower field and the left lower field. He has no wheezes. He has no rales.   Abdominal: Soft. He exhibits no distension. There is no tenderness.   Genitourinary:   Genitourinary Comments: Suprapubic catheter   Musculoskeletal: He exhibits no edema or deformity.   Neurological: He appears listless. GCS eye subscore is 4. GCS verbal subscore is 1. GCS motor subscore is 5.   Localizes x4  Possibly tracking     Skin: Skin is warm and dry. No pallor.   Nursing note and vitals reviewed.      Laboratory  Recent Results (from the past 24 hour(s))   ACCU-CHEK GLUCOSE    Collection Time: 04/16/19 12:18 AM   Result " Value Ref Range    Glucose - Accu-Ck 196 (H) 65 - 99 mg/dL   CBC WITH DIFFERENTIAL    Collection Time: 04/16/19  4:00 AM   Result Value Ref Range    WBC 15.7 (H) 4.8 - 10.8 K/uL    RBC 3.16 (L) 4.70 - 6.10 M/uL    Hemoglobin 9.7 (L) 14.0 - 18.0 g/dL    Hematocrit 31.5 (L) 42.0 - 52.0 %    MCV 99.7 (H) 81.4 - 97.8 fL    MCH 30.7 27.0 - 33.0 pg    MCHC 30.8 (L) 33.7 - 35.3 g/dL    RDW 62.5 (H) 35.9 - 50.0 fL    Platelet Count 298 164 - 446 K/uL    MPV 9.5 9.0 - 12.9 fL    Neutrophils-Polys 82.40 (H) 44.00 - 72.00 %    Lymphocytes 10.00 (L) 22.00 - 41.00 %    Monocytes 4.30 0.00 - 13.40 %    Eosinophils 1.70 0.00 - 6.90 %    Basophils 0.60 0.00 - 1.80 %    Immature Granulocytes 1.00 (H) 0.00 - 0.90 %    Nucleated RBC 0.00 /100 WBC    Neutrophils (Absolute) 12.92 (H) 1.82 - 7.42 K/uL    Lymphs (Absolute) 1.56 1.00 - 4.80 K/uL    Monos (Absolute) 0.68 0.00 - 0.85 K/uL    Eos (Absolute) 0.26 0.00 - 0.51 K/uL    Baso (Absolute) 0.10 0.00 - 0.12 K/uL    Immature Granulocytes (abs) 0.15 (H) 0.00 - 0.11 K/uL    NRBC (Absolute) 0.00 K/uL   Basic Metabolic Panel    Collection Time: 04/16/19  4:00 AM   Result Value Ref Range    Sodium 146 (H) 135 - 145 mmol/L    Potassium 4.4 3.6 - 5.5 mmol/L    Chloride 112 96 - 112 mmol/L    Co2 27 20 - 33 mmol/L    Glucose 220 (H) 65 - 99 mg/dL    Bun 75 (HH) 8 - 22 mg/dL    Creatinine 2.95 (H) 0.50 - 1.40 mg/dL    Calcium 8.0 (L) 8.5 - 10.5 mg/dL    Anion Gap 7.0 0.0 - 11.9   ESTIMATED GFR    Collection Time: 04/16/19  4:00 AM   Result Value Ref Range    GFR If  26 (A) >60 mL/min/1.73 m 2    GFR If Non African American 22 (A) >60 mL/min/1.73 m 2   VANCOMYCIN TIMED (RANDOM) LEVEL    Collection Time: 04/16/19 12:30 PM   Result Value Ref Range    Vancomycin Unknown Level 15.3 ug/mL   PHOSPHORUS    Collection Time: 04/16/19 12:30 PM   Result Value Ref Range    Phosphorus 1.4 (L) 2.5 - 4.5 mg/dL   ACCU-CHEK GLUCOSE    Collection Time: 04/16/19 12:33 PM   Result Value Ref Range     Glucose - Accu-Ck 162 (H) 65 - 99 mg/dL   ACCU-CHEK GLUCOSE    Collection Time: 04/16/19  5:28 PM   Result Value Ref Range    Glucose - Accu-Ck 160 (H) 65 - 99 mg/dL       Fluids    Intake/Output Summary (Last 24 hours) at 04/16/19 1831  Last data filed at 04/16/19 1800   Gross per 24 hour   Intake           2031.7 ml   Output             1284 ml   Net            747.7 ml       Core Measures & Quality Metrics  Labs reviewed, Medications reviewed and Radiology images reviewed  Jacob catheter: Urinary Tract Retention or Urinary Tract Obstruction      DVT Prophylaxis: Heparin  DVT prophylaxis - mechanical: SCDs  Ulcer prophylaxis: Yes    Assessed for rehab: Patient unable to tolerate rehabilitation therapeutic regimen    FLORECITA Score  ETOH Screening    Assessment/Plan  Seizures (HCC)   Assessment & Plan    New twitching noted  Sustained seizures on brief EEG  Keppra increased to 1 g every 12h  4/16 continuous EEG started  TCD without vasospasm  Rolan Lopez MD: Neurology     Encounter for evaluation of ability to make decisions regarding care   Assessment & Plan    Patient intubated with severe TBI  Family contacted, awaiting brother to discuss plan of care     Traumatic brain injury (HCC)- (present on admission)   Assessment & Plan    2 adjacent areas of intraparenchymal hemorrhage involving the right frontal lobe with some surrounding vasogenic edema  Bilateral acute subdural hematoma, right greater than left with a large amount of blood in the right middle cranial fossa.  Bilateral acute subarachnoid hemorrhage.  There is right-sided hemispheric edema with 5 mm of right-to-left midline shift.   Anti-seizure prophylaxis, Keppra, x 7 days  4/10 Exam improved to 11T.  Operative therapy and ICP deferred.  4/16 increased eye-opening and movement: Trend exam  Keppra increased: Seizures seem to have stopped  Trend sodium  Discussed with family: Brain injury in the setting of multiple comorbidities with suboptimal  prognosis  They requested we continue care for now  Non-operative management.   Joshua Dsouza MD. Neurosurgery.     Respiratory failure following trauma (HCC)- (present on admission)   Assessment & Plan    Intubated in trauma bay  4/16 ongoing ASV weaning  Discussed tracheostomy with brother: Would like to delay until additional family here  Continue SICU weaning protocol   Continue ventilator bundle      End stage renal disease (HCC)   Assessment & Plan    Chronic dialysis patient, [facility]  4/11 - nephrology consult, dialyzed 4/12 Tuesday Thursday Saturday dialysis  Dr. [place], nephrology     Contraindication to deep vein thrombosis (DVT) prophylaxis- (present on admission)   Assessment & Plan    Systemic anticoagulation contraindicated secondary to elevated bleeding risk.  4/12 Surveillance venous duplex negative  4/13 prophylactic heparin started     Leukocytosis   Assessment & Plan    4/15 white count up to 16.2: Trend  Blood cultures and sputum cultures sent  Cefepime and vancomycin initiated  Trend cultures     Closed fracture of skull (HCC)- (present on admission)   Assessment & Plan    Acute mildly displaced left frontal bone skull fracture.  Non-operative management.  Joshua Dsouza MD. Neurosurgery.     History of suprapubic catheter- (present on admission)   Assessment & Plan    Present prior to arrival      Trauma- (present on admission)   Assessment & Plan    [facility] suspect ground level fall, transported to [facility].  Initial non-trauma activation. Upgrade to trauma red after CT imaging demonstrated a traumatic brain injury and skull fracture  Dawit Schreiber MD. Trauma Surgery.       CRITICAL CARE DECISION MAKING:    Patiens examined with team at bedside, care discussed with team    Addressed pulmonary hygiene concerns as well as oxygenation/ventilation.   Patient has minimal neurologic improvement still progressing with ASV weaning.  Made adjustments to  oxygenation and pressure patterns.  Discussed tracheostomy placement with brother at bedside including indications to facilitate weaning protocol.    Patient's brother does not want to make healthcare decisions.  He defers to another brother who is being discharged from rehab in [place] and should be here at week.    Labs reviewed, electrolytes addressed, renal function assessed by nephrology  Reviewed nutrition strategies, recent indices  Addressed GI prophylaxis and bowel frequency  Assessed/discussed/titrated analgesics and need for sedatives  Addressed DVT prophylaxis  Addressed line days, mora catheter days, access needs  Addressed family and discharge concerns      Discussed patient condition with Family, RN, RT, Pharmacy, Dietary and .  CRITICAL CARE TIME EXCLUDING PROCEDURES: 37   minutes

## 2019-04-17 NOTE — NON-PROVIDER
Patient Summary:  Patient is a 62 y/o male on hospital day 5 with ESRD who is inpatient after [facility] CT scan was found to have intraparenchymal hemorrhages in 2 areas of the right frontal lobe, bilateral subdural hematomas, and bilateral acute subarachnoid hemorrhages. He was subsequently intubated.        24 Hour Events:     Remains intubated - vent day 7  Hemodialysis received 4/16 - 1L removed  Patient weaned SBT x 1 for 1 hour.   No seizures overnight. EEG abnormal, however no seizures captured.   Vancomycin dose changed to 1400mg  Increased white count, downtrending (15.7 -> 13.2)  Anemic at 9.3  Decreased dosage of Vancomycin due to dialysis        SUBJECTIVE/OBJECTIVE:  NEURO:  GCS  24hr: 9T   Current: 8T   Exam Unable to complete due to patient being unresponsive at time of visit.   Meds/Pain Keppra 1000mg BID  Continuous propofol 0-80mcg/kg/min;   IV morphine 2mg q1 hr PRN  Ativan 1mg PRN   Labs Diagnostic alcohol 0.00 on admission   Imaging Transcranial Doppler (4/16):   Normal bilateral transcranial doppler examination without evidence of    intracranial vasospasm.    Continuous EEG (4/16):   This is an abnormal 24 hrs video EEG recording in sedated and/or encephalopathic patient. An asymmetric background with slowing in the left hemisphere and attenuation of the cerebral electrical activity on the right hemisphere noted. The findings may reflect underlying structural abnormalities. No seizures or events captured during this prolonged study. Clinical and radiological correlation is recommended.    EEG: (4/16) This is an abnormal video EEG recording in the awake and drowsy state(s).  A mild encephalopathy is suggested. Several events of left eye deviation, staring, left jaw chewing movements were captured, and were in keeping with focal, recurrent right frontal seizures. Clinical and radiological correlation is recommended.     Repeat CT Head (4/15)  1.  Frontal hemorrhages, stable  since prior.  2.  Bilateral subdural hematomas, appears stable since prior given difference of technique.  3.  Left frontal hemorrhagic contusion versus subarachnoid hemorrhage, stable.  4.  Right parafalcine subdural hematoma posteriorly, stable.  5.  Scattered right subarachnoid hemorrhages, similar to prior study.  6.  Atherosclerosis      RESP:  RR, SpO2 RR: 20; SpO2: 99%   Vent Mode: Adaptive support ventilation; rate: 14; FiO2: 30%, PEEP: 8.0   Exam Equal breath sounds bilaterally   Meds None   Labs Arterial Blood Gas:    Ref. Range 4/15/2019 04:06   Ph Latest Ref Range: 7.400 - 7.500  7.499   Pco2 Latest Ref Range: 26.0 - 37.0 mmHg 36.7   Po2 Latest Ref Range: 64 - 87 mmHg 102 (H)   Hco3 Latest Ref Range: 17.0 - 25.0 mmol/L 28.5 (H)   BE Latest Ref Range: -4 - 3 mmol/L 5 (H)   Tco2 Latest Ref Range: 20 - 33 mmol/L 30   S02 Latest Ref Range: 93 - 99 % 98   Ph Temp Birdie Latest Ref Range: 7.400 - 7.500  7.504 (H)   Pco2 Temp Co Latest Ref Range: 26.0 - 37.0 mmHg 36.1   Po2 Temp Cor Latest Ref Range: 64 - 87 mmHg 100 (H)       Imaging Repeat CXR 4/17/19:  1.  Pulmonary edema and/or infiltrates are identified, which are stable since the prior exam.  2.  Cardiomegaly  3.  Atherosclerosis    CXR 4/16/19:  1.  Pulmonary edema and/or infiltrates are identified, which are stable since the prior exam.  2.  Cardiomegaly  3.  Atherosclerosis         CV:  HR/BP/MAP/  CVP HR: average 101; BP: /58-68; MAP: 77-83   Exam Normal S1, S2; Systolic murmur, no rubs or gallops   Meds None   Labs Troponin: .60; BNP: >5000 on admission   Imaging CXR 4/17:  1.  Pulmonary edema and/or infiltrates are identified, which are stable since the prior exam.  2.  Cardiomegaly  3.  Atherosclerosis    CXR:   4/16:   1.  Pulmonary edema and/or infiltrates are identified, which are stable since the prior exam.  2.  Cardiomegaly  3.  Atherosclerosis     4/15:Pulmonary edema and/or infiltrates are identified, which are stable since the prior  exam. Position of medical devices appears stable. The cardiac silhouette appears within normal limits.     4/11: Left-sided rib fractures.     ECG (4/10):   Sinus tachycardia   Borderline repolarization abnormality      GI:       Diet/Bowel Function Feeding tube - continuous diet 55ml/hr  Senna-Docusate 8.6-50 mg per tab 1 tab nightly  Miralax 1 packet 2 times daily  Milk of Magnesia - 30mL daily  Famotidine 20mg daily  Dulcolax suppository 10mg q24PRN  Fleet enema 133mL one PRN  Zofran 4mg q4h PRN    Exam Bowel sounds normal    Labs Results for ILDEFONSO SYED (MRN [id number]) as of 4/15/2019 11:39    Ref. Range 4/15/2019 05:55   Triglycerides Latest Ref Range: 0 - 149 mg/dL 87            Results for ILDEFONSO SYED (MRN [id number]) as of 4/15/2019 11:39    Ref. Range 4/14/2019 18:10   Hep B Surface Antibody Quant Latest Ref Range: 0.00 - 10.00 mIU/mL 60.15 (H)          Imaging None       F/E/N-:  I/O .    Intake/Output Summary (Last 24 hours) at 04/17/19 0745  Last data filed at 04/17/19 0600   Gross per 24 hour   Intake           2360.2 ml   Output             1263 ml   Net           1097.2 ml          Suprapubic Catheter in place  Hemodialysis 1L on 4/16/19  Hemodialysis 1000mL x3 hours on 4/14/19   Exam Catheter in place; urine color normal and nonbloody   Meds D50W injection 50mL every 15 minutes PRN IV   Labs Results for ILDEFONSO SYED (MRN [id number]) as of 4/17/2019 07:38   Ref. Range 4/17/2019 05:00   Sodium Latest Ref Range: 135 - 145 mmol/L 140   Potassium Latest Ref Range: 3.6 - 5.5 mmol/L 4.2   Chloride Latest Ref Range: 96 - 112 mmol/L 103   Co2 Latest Ref Range: 20 - 33 mmol/L 28   Anion Gap Latest Ref Range: 0.0 - 11.9  9.0   Glucose Latest Ref Range: 65 - 99 mg/dL 154 (H)   Bun Latest Ref Range: 8 - 22 mg/dL 71 (HH)   Creatinine Latest Ref Range: 0.50 - 1.40 mg/dL 2.45 (H)   GFR If  Latest Ref Range: >60 mL/min/1.73 m 2 32 (A)   GFR If Non  Latest Ref Range: >60  mL/min/1.73 m 2 27 (A)   Calcium Latest Ref Range: 8.5 - 10.5 mg/dL 8.0 (L)        Nutrition Enteral feeds;  Calculation/Equation MSJ x 1.2 = 1797 kcals  Calories/day: 1800 - 2000 kcals (26 - 29 kcals/kg)  Protein/day: 103 - 136 g (1.5 - 2.0 g/kg) 7103-2193 lou/day; goals met today      HEME:  Labs Results for ILDEFONSO SYED (MRN [id number]) as of 4/17/2019 07:38   Ref. Range 4/17/2019 05:00   WBC Latest Ref Range: 4.8 - 10.8 K/uL 13.2 (H)   RBC Latest Ref Range: 4.70 - 6.10 M/uL 2.99 (L)   Hemoglobin Latest Ref Range: 14.0 - 18.0 g/dL 9.3 (L)   Hematocrit Latest Ref Range: 42.0 - 52.0 % 29.4 (L)   MCV Latest Ref Range: 81.4 - 97.8 fL 98.3 (H)   MCH Latest Ref Range: 27.0 - 33.0 pg 31.1   MCHC Latest Ref Range: 33.7 - 35.3 g/dL 31.6 (L)   RDW Latest Ref Range: 35.9 - 50.0 fL 62.1 (H)   Platelet Count Latest Ref Range: 164 - 446 K/uL 305   MPV Latest Ref Range: 9.0 - 12.9 fL 9.4   Neutrophils-Polys Latest Ref Range: 44.00 - 72.00 % 79.30 (H)   Neutrophils (Absolute) Latest Ref Range: 1.82 - 7.42 K/uL 10.46 (H)   Lymphocytes Latest Ref Range: 22.00 - 41.00 % 12.30 (L)   Lymphs (Absolute) Latest Ref Range: 1.00 - 4.80 K/uL 1.63   Monocytes Latest Ref Range: 0.00 - 13.40 % 5.10   Monos (Absolute) Latest Ref Range: 0.00 - 0.85 K/uL 0.67   Eosinophils Latest Ref Range: 0.00 - 6.90 % 2.00   Eos (Absolute) Latest Ref Range: 0.00 - 0.51 K/uL 0.27   Basophils Latest Ref Range: 0.00 - 1.80 % 0.30   Baso (Absolute) Latest Ref Range: 0.00 - 0.12 K/uL 0.04   Immature Granulocytes Latest Ref Range: 0.00 - 0.90 % 1.00 (H)   Immature Granulocytes (abs) Latest Ref Range: 0.00 - 0.11 K/uL 0.13 (H)   Nucleated RBC Latest Units: /100 WBC 0.00   NRBC (Absolute) Latest Units: K/uL 0.00       Transfusions none   Imaging none      ID:  Temp  24hr: 97.0 - 99.9   Tmax: 99.9   Labs See above   Micro Blood culture ordered 4/15/19 - No Growth      Results for ILDEFONSO SYED (MRN [id number]) as of 4/15/2019 11:39    Ref. Range 4/10/2019 11:19  "4/10/2019 11:23   Significant Indicator Unknown NEG NEG   Site Unknown PERIPHERAL PERIPHERAL   Source Unknown BLD BLD       ABX Cefepime - discontinued due to lower seizure threshold  Vancomycin 1400 mg      ENDOCRINE:       Blood Sugar Results for ILDEFONSO SYED (MRN [id number]) as of 4/17/2019 07:38   Ref. Range 4/16/2019 12:33 4/16/2019 17:28 4/17/2019 00:06 4/17/2019 05:00   Glucose - Accu-Ck Latest Ref Range: 65 - 99 mg/dL 162 (H) 160 (H) 152 (H) 140 (H)      Meds Insulin regular (humulin R) injection 3-4 Units every 6 hours       MUSCULOSKELETAL:  Imaging None   WB Status Non-ambulatory      SKIN:  Exam Pressure ulcer, Scabs to bilateral toes, Sacrum red with mepilex in place  Heel boots in place  Fistula in left arm   Interventions Heel dressings; mepilex placed at sacrum      ASSESSMENT/PLAN:  NEURO:  Traumatic Brain Injury  Closed Fracture of Skull  Seizures     Patient's CT scan was found to have intraparenchymal hemorrhages in 2 areas of the right frontal lobe, bilateral subdural hematomas, and bilateral acute subarachnoid hemorrhages. Additionally, closed fracture of the skull. Repeat head CT stable with no changes. EEG shows right frontal seizures. Continuous EEG did not indicate seizure activity, however \"asymmetric background with slowing in the left hemisphere and attenuation of the cerebral electrical activity on the right hemisphere noted.\" Transcranial doppler was within normal limits without vasospasm.     - CTM, q4 neurochecks  - Neurosurgery signed off, may need to move towards trach/peg. Waiting on family decision.   - Keppra 1000 mg BID by neurology  - Per neurology Ativan 1mg IV PRN for breakthrough/GTCS\  - Neurology recommended discontinuing Cefepime due to lowered seizure threshold - discontinued.      RESP:  Respiratory Failure following trauma     Patient remains intubated. Weaned to SBT x1 hour. CXR significant for rib fracture. Repeat CXR shows pulmonary edema and/or infiltrates are " identified, which are stable since the prior exam.      - Continue ventilation  - Bronchoscopy and Tracheostomy considered. Patient's brother does not want to make healthcare decisions. Waiting for another brother that will be here next week.      CV:     Patient demonstrates no acute cardiopulmonary process. CXR 4/16 and 4/17 indicates cardiomegaly.  - Continue to monitor     GI:  Patient demonstrates no acute GI processes.  - Will continue to monitor  - Continue bowel regimen  - Plan for peg tube however waiting for family decision     FEN-:  ESRD on Dialysis  History of chronic suprapubic catheter     Patient has no acute  findings. 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      - Continue to monitor output and electrolytes  - Continue dialysis  - Recheck phos and supplement per Nephrology  - continue salt tablets for high intracranial pressure per Nephrology     HEME:  Patient anemic at Hb 9.6.   - Continue to monitor  - Transfusion if hemoglobin <7.0     ID:  Patient has leukocytosis trending down. Blood cultures from 4/15 did not show growth. Prophylactic antibiotics. Blood cultures show no growth    - Monitor for sepsis  - Vancomycin 1400 mg   - Cefepime 1g - discontinued due to lowered seizure threshold        ENDOCRINE:  PMH significant for Type II DM. Insulin Lispro 3-14 units q6 hours. Blood glucose 152 and 140.  - Continue to monitor blood glucose.         MUSCULOSKELETAL:  CXR indicated rib fracture.  - Pain control with morphine     SKIN:   Wound on right heel. Sacrum red.   - Continue to monitor  - Wound dressing change regularly     PROPHYLAXIS:  DVT Chemical DVT prophylaxis contraindicated; SCD in place   GI Bowel regimen noted above.   Restraints None      LINES/TUBES/CATHETERS:  Suprapubic catheter; day 7  Peripheral IV, antecubital; day 7  Peripheral IV, Forearm; day 7  Airway Intubation; day  7  Enteral tube; day 6

## 2019-04-17 NOTE — PROGRESS NOTES
Neuro hospitalist at bedside and okay to d/c continuous EEG after 24 hours. I called the EEG tech and inquired how long it has been /what time started and she was going to discuss with Dr Lopez.

## 2019-04-18 NOTE — DIETARY
Nutrition support weekly update:  Day 8 of admit.  64 yo male admitted following a GLF at dialysis center.  Tube feeding initiated on 4/11. Current TF Impact 1.5 @ 55 ml/hr providing 1980 kcals, 124 g protein, 1019 ml H20/day     Assessment:  Weight yesterday 75.7 kg is increased 7.3 kg since admit weight of 68.4 kg.  Pt is 9.4 liters fluid positive today.     Evaluation:   1. BUN/Cr increased - History of ESRD with HD 3 x per week.  HD here today.   2. Prognosis guarded, awaiting decision making by family  3. Hyperglycemia - history of DM. lantus and SSI for control. Pt is on a low CHO TF formula.  4. Pt seen by wound team and noted to have an unstageable pressure ulcer to heal and stage 1 to sacrum. Pt receiving adequate nutrition from tube feeding at goal for would healing.  5. Prealbumin 8.0 and CRP 14.17 - will follow for trending.  6. Current feeding meeting nutritional needs    Malnutrition risk: na    Recommendations/Plan: Continue same TF formula and rate

## 2019-04-18 NOTE — PROGRESS NOTES
[facility] Nephrology Daily Progress Note    Date of Service  4/18/2019    Chief Complaint  63-year-old male who has end-stage renal   disease and is on chronic hemodialysis on Tuesdays, Thursdays and Saturdays at   [facility].  The patient dialyzes for 4 hours.  [facility]  He was transferred to [facility] and had a CT of the brain that revealed mildly displaced left   frontal skull fracture and intraparenchymal hemorrhage with edema and   bilateral subdural hematomas.  The patient had worsening respiratory status   after the CT and required intubation and mechanical ventilation.      Interval Problem Update  04/11/19 - Consult done.no HD today.  04/12/19 - No new events.Ventilated. mL.  04/13/19 - Intubated.  In ICU.  No new events.  04/14/19 - Intubated.  In ICU.  No new events.  Dialysis today.  04/15/19 - No new overnight renal events. Right cerebellar hemispheric edema which has significantly improved.  04/16/19 - HD today. No changes overnight. Net UF removed 1L.  04/17/19 - No new events overnight from renal standpoint.   04/18/19 - No changes today. HD today.    Review of Systems  Review of Systems   Unable to perform ROS: Intubated     Physical Exam  Pulse:  [] 107  Resp:  [13-26] 13  SpO2:  [98 %-100 %] 100 %    Physical Exam   Constitutional: He appears well-developed and well-nourished.   Ventilated   HENT:   Right Ear: External ear normal.   Left Ear: External ear normal.   Skull Fx  JOANIE  Nasal feeding tube   Eyes: Conjunctivae are normal. Right eye exhibits no discharge. Left eye exhibits no discharge.   Neck: Normal range of motion. Neck supple. No thyromegaly present.   Cardiovascular: Normal rate and regular rhythm.  Exam reveals no friction rub.    Murmur heard.  Pulmonary/Chest: Breath sounds normal. He has no wheezes. He has no rales.   Ventilated   Abdominal: Soft. Bowel sounds are normal. He exhibits  no distension and no mass. There is no tenderness. There is no guarding.   Genitourinary:   Genitourinary Comments: Suprapubic catheter   Musculoskeletal:   Trace edema LE's  LUE AVF  R heel ulcer   Lymphadenopathy:     He has cervical adenopathy.   Neurological:   No interaction   Skin: Skin is warm and dry. No rash noted. No erythema.   Nursing note and vitals reviewed.      Fluids    Intake/Output Summary (Last 24 hours) at 04/18/19 0931  Last data filed at 04/18/19 0600   Gross per 24 hour   Intake             1520 ml   Output              310 ml   Net             1210 ml       Laboratory  Recent Labs      04/16/19   0400  04/17/19   0500  04/18/19   0437   WBC  15.7*  13.2*  13.2*   RBC  3.16*  2.99*  3.07*   HEMOGLOBIN  9.7*  9.3*  9.5*   HEMATOCRIT  31.5*  29.4*  30.3*   MCV  99.7*  98.3*  98.7*   MCH  30.7  31.1  30.9   MCHC  30.8*  31.6*  31.4*   RDW  62.5*  62.1*  62.7*   PLATELETCT  298  305  356   MPV  9.5  9.4  9.5     Recent Labs      04/16/19   0400  04/17/19   0500  04/18/19   0437   SODIUM  146*  140  140   POTASSIUM  4.4  4.2  4.1   CHLORIDE  112  103  105   CO2  27  28  27   GLUCOSE  220*  154*  198*   BUN  75*  71*  94*   CREATININE  2.95*  2.45*  2.85*   CALCIUM  8.0*  8.0*  8.4*             Recent Labs      04/18/19   0437   TRIGLYCERIDE  60       PMH/FH/SH reviewed    IMPRESSION:  #  End-stage renal disease, on chronic hemodialysis Tuesdays, Thursdays and   Saturdays.  #  Status post traumatic intracerebral bleed with bilateral subdural   hematomas as well.  He has mildly displaced left frontal skull fracture.  #  Ventilator-dependent respiratory failure.  #  Diabetes mellitus.Per primary team.  #  History of hypertension and also history of hypotension with orthostasis,   on midodrine.  #  Anemia of chronic kidney disease, iron repleted.Hb above target  #  Chronic kidney disease -- Metabolic bone disorder, most recent vitamin D   is normal.  PTH has been suppressed.  He was on calcitriol in  the past.  PO4 normal.  #  History of penile necrosis.  #  Status post suprapubic catheter.  #  History of atrial fibrillation, previously on amiodarone.    #  Functional left upper extremity AV fistula.  #  Hypoalbuminemia.  #  Elevated bilirubin.  #  R heel ulcer  #  Leukocytosis. ? Reactive    PLAN:  #  Dialysis today.      #  No dietary protein restrictions.  #  All of his medicines should be dosed for GFR less than 10.  #  On enteral feedings. No phosphorus restrictions.  #  Recheck serum phos and supplement.  #  On salt tablets for high intracranial pressure

## 2019-04-18 NOTE — PROGRESS NOTES
Trauma / Surgical Daily Progress Note    Date of Service  4/18/2019    Interval Events  Uncertain what the patient's wishes would be, [facility]  Holding on trach per family  Dialysis today  Prognosis guarded    Review of Systems  Review of Systems     Vital Signs for last 24 hours  Pulse:  [] 101  Resp:  [13-26] 14  SpO2:  [98 %-100 %] 99 %    Hemodynamic parameters for last 24 hours       Respiratory Data     Respiration: 14, Pulse Oximetry: 99 %     Work Of Breathing / Effort: Vented  RUL Breath Sounds: Clear After Suction, RML Breath Sounds: Diminished, RLL Breath Sounds: Diminished, ADRIEL Breath Sounds: Clear After Suction, LLL Breath Sounds: Diminished    Physical Exam  Physical Exam   Constitutional: He appears well-developed and well-nourished. He is sleeping and uncooperative. He is easily aroused. He has a sickly appearance. He is intubated and restrained.   HENT:   Wounds CDI   Eyes: Pupils are equal, round, and reactive to light. Conjunctivae are normal.   Neck: Neck supple. No tracheal deviation present.   Cardiovascular: Regular rhythm and intact distal pulses.   No extrasystoles are present. Tachycardia present.    Pulmonary/Chest: No stridor. He is intubated. He has decreased breath sounds in the right lower field and the left lower field. He has no wheezes. He has no rhonchi.   Abdominal: Soft. Bowel sounds are normal. He exhibits distension. There is no tenderness. There is no rebound.   Genitourinary:   Genitourinary Comments: Suprapubic catheter   Neurological: He is easily aroused. GCS eye subscore is 3. GCS verbal subscore is 1. GCS motor subscore is 5.   Skin: Skin is warm and dry. No pallor.   Nursing note and vitals reviewed.      Laboratory  Recent Results (from the past 24 hour(s))   ACCU-CHEK GLUCOSE    Collection Time: 04/17/19  5:45 PM   Result Value Ref Range    Glucose - Accu-Ck 201 (H) 65 - 99 mg/dL   ACCU-CHEK GLUCOSE    Collection Time: 04/17/19 11:17 PM   Result  Value Ref Range    Glucose - Accu-Ck 182 (H) 65 - 99 mg/dL   CBC WITH DIFFERENTIAL    Collection Time: 04/18/19  4:37 AM   Result Value Ref Range    WBC 13.2 (H) 4.8 - 10.8 K/uL    RBC 3.07 (L) 4.70 - 6.10 M/uL    Hemoglobin 9.5 (L) 14.0 - 18.0 g/dL    Hematocrit 30.3 (L) 42.0 - 52.0 %    MCV 98.7 (H) 81.4 - 97.8 fL    MCH 30.9 27.0 - 33.0 pg    MCHC 31.4 (L) 33.7 - 35.3 g/dL    RDW 62.7 (H) 35.9 - 50.0 fL    Platelet Count 356 164 - 446 K/uL    MPV 9.5 9.0 - 12.9 fL    Neutrophils-Polys 79.30 (H) 44.00 - 72.00 %    Lymphocytes 11.40 (L) 22.00 - 41.00 %    Monocytes 6.20 0.00 - 13.40 %    Eosinophils 1.30 0.00 - 6.90 %    Basophils 0.70 0.00 - 1.80 %    Immature Granulocytes 1.10 (H) 0.00 - 0.90 %    Nucleated RBC 0.00 /100 WBC    Neutrophils (Absolute) 10.47 (H) 1.82 - 7.42 K/uL    Lymphs (Absolute) 1.51 1.00 - 4.80 K/uL    Monos (Absolute) 0.82 0.00 - 0.85 K/uL    Eos (Absolute) 0.17 0.00 - 0.51 K/uL    Baso (Absolute) 0.09 0.00 - 0.12 K/uL    Immature Granulocytes (abs) 0.15 (H) 0.00 - 0.11 K/uL    NRBC (Absolute) 0.00 K/uL   Basic Metabolic Panel    Collection Time: 04/18/19  4:37 AM   Result Value Ref Range    Sodium 140 135 - 145 mmol/L    Potassium 4.1 3.6 - 5.5 mmol/L    Chloride 105 96 - 112 mmol/L    Co2 27 20 - 33 mmol/L    Glucose 198 (H) 65 - 99 mg/dL    Bun 94 (HH) 8 - 22 mg/dL    Creatinine 2.85 (H) 0.50 - 1.40 mg/dL    Calcium 8.4 (L) 8.5 - 10.5 mg/dL    Anion Gap 8.0 0.0 - 11.9   MAGNESIUM    Collection Time: 04/18/19  4:37 AM   Result Value Ref Range    Magnesium 2.2 1.5 - 2.5 mg/dL   PHOSPHORUS    Collection Time: 04/18/19  4:37 AM   Result Value Ref Range    Phosphorus 2.0 (L) 2.5 - 4.5 mg/dL   Triglyceride    Collection Time: 04/18/19  4:37 AM   Result Value Ref Range    Triglycerides 60 0 - 149 mg/dL   ESTIMATED GFR    Collection Time: 04/18/19  4:37 AM   Result Value Ref Range    GFR If  27 (A) >60 mL/min/1.73 m 2    GFR If Non African American 22 (A) >60 mL/min/1.73 m 2    ACCU-CHEK GLUCOSE    Collection Time: 04/18/19  5:20 AM   Result Value Ref Range    Glucose - Accu-Ck 192 (H) 65 - 99 mg/dL   ACCU-CHEK GLUCOSE    Collection Time: 04/18/19 12:14 PM   Result Value Ref Range    Glucose - Accu-Ck 148 (H) 65 - 99 mg/dL       Fluids    Intake/Output Summary (Last 24 hours) at 04/18/19 1533  Last data filed at 04/18/19 1407   Gross per 24 hour   Intake             2160 ml   Output             1970 ml   Net              190 ml       Core Measures & Quality Metrics  Labs reviewed, Medications reviewed and Radiology images reviewed  Jacob catheter: No Jacob      DVT Prophylaxis: Heparin  DVT prophylaxis - mechanical: SCDs  Ulcer prophylaxis: Yes  Antibiotics: Treating active infection/contamination beyond 24 hours perioperative coverage      FLORECITA Score  ETOH Screening    Assessment/Plan  Seizures (HCC)   Assessment & Plan    New twitching noted  Sustained seizures on brief EEG  Keppra increased to 1 g every 12h  4/17 24hr EEG without seizure activity  TCD without vasospasm  Rolan Lopez MD: Neurology     Leukocytosis   Assessment & Plan    4/15 WBC 16.2, Blood cultures and sputum cultures sent, cefepime and vancomycin initiated  4/18 Antibiotic day 4/7: De-escalation to Ancef for MSSA in sputum     Traumatic brain injury (HCC)- (present on admission)   Assessment & Plan    2 adjacent areas of intraparenchymal hemorrhage involving the right frontal lobe with some surrounding vasogenic edema  Bilateral acute subdural hematoma, right greater than left with a large amount of blood in the right middle cranial fossa.  Bilateral acute subarachnoid hemorrhage.  There is right-sided hemispheric edema with 5 mm of right-to-left midline shift.   4/10 Exam improved to 11T.  Surgery deferred.  4/15 Seizure on spot EEG: Keppra increased  4/17 24-hour EEG without sign of seizures: Continue current Keppra regimen  Discussed with family: Brain injury in the setting of multiple comorbidities with suboptimal  prognosis  They requested we continue care for now  Target normal serum sodium  Non-operative management.   Joshua Dsouza MD. Neurosurgery.     Respiratory failure following trauma (HCC)- (present on admission)   Assessment & Plan    Intubated in trauma bay  Tracheostomy remains on hold until family can meet  Continue SICU weaning protocol   Continue ventilator bundle      End stage renal disease (HCC)   Assessment & Plan    Chronic dialysis patient with mature extremity fistula  3X/week dialysis  Dr. Birmingham, nephrology     Contraindication to deep vein thrombosis (DVT) prophylaxis- (present on admission)   Assessment & Plan    Systemic anticoagulation contraindicated secondary to elevated bleeding risk.  4/12 Surveillance venous duplex negative  4/13 prophylactic heparin started due to renal impairment     Encounter for evaluation of ability to make decisions regarding care- (present on admission)   Assessment & Plan    Brother and sister-in-law from [place] did not wish to be decision makers  Awaiting older brother from [place] the week of 4/22     Closed fracture of skull (HCC)- (present on admission)   Assessment & Plan    Acute mildly displaced left frontal bone skull fracture.  Non-operative management.  Joshua Dsouza MD. Neurosurgery     History of suprapubic catheter- (present on admission)   Assessment & Plan    Difficult to determine necessity of this  Present prior to arrival      Trauma- (present on admission)   Assessment & Plan    [facility], transported to [facility].  Initial non-trauma activation. Upgrade to trauma red after CT imaging demonstrated a traumatic brain injury and skull fracture  Dawit Schreiber MD. Trauma Surgery.       I independently reviewed pertinent clinical lab tests from the last 48 hours and ordered additional follow up clinical lab tests.  I independently reviewed pertinent radiographic images and the radiologist's reports from the last 48  hours and ordered additional follow up radiographic studies.  I reviewed the details of the available patient records and documentation by consulting physicians in [facility] up to today, summated the information, and utilized the information as warranted in today's medical decision making for this patient.  I personally evaluated the patient condition at bedside and discussed the daily plan(s) with available nursing staff, dieticians, social workers, pharmacists on rounds.    Persistent respiratory failure requiring mechanical ventilation involving high complexity decision making initiated in an urgent manner by assessing, manipulating, and supporting pulmonary function given the high probability of further deterioration in the patient's condition and threat to life.    I am actively managing this patient's mechanical ventilation and directly involved in the adjustment of multiple settings (FiO2, PEEP, tidal volume, and minute ventilation) based on my personal bedside evaluation of this patient's radiographic, and laboratory findings and clinical changes throughout the day.    Aggregated critical care time spent evaluating, reviewing documentation, providing care, and managing this patient exclusive of procedures: 40 minutes    Yimi Cannon MD

## 2019-04-18 NOTE — PROGRESS NOTES
"Trauma / Surgical Daily Progress Note    Date of Service  4/17/2019    Chief Complaint  63 y.o. male admitted 4/10/2019 with Trauma    Interval Events  Tolerating feeding  BM x2  Afebrile      Review of Systems  Review of Systems   Unable to perform ROS: Intubated        Vital Signs for last 24 hours  Pulse:  [] 106  Resp:  [13-23] 18  SpO2:  [98 %-100 %] 100 %    Hemodynamic parameters for last 24 hours    /81   Pulse (!) 106   Temp 36.9 °C (98.4 °F) (Axillary)   Resp 18   Ht 1.803 m (5' 11\")   Wt 75.7 kg (166 lb 14.2 oz)   SpO2 100%   BMI 23.28 kg/m²     Respiratory Data     Respiration: 18, Pulse Oximetry: 100 %     Work Of Breathing / Effort: Vented  RUL Breath Sounds: Clear, RML Breath Sounds: Clear, RLL Breath Sounds: Clear, ADRIEL Breath Sounds: Clear, LLL Breath Sounds: Clear    Physical Exam  Physical Exam   Constitutional: He appears well-developed and well-nourished. He appears listless. He is sleeping and uncooperative. He is easily aroused. He has a sickly appearance. He is sedated, intubated and restrained.   HENT:   Wounds CDI   Eyes: Pupils are equal, round, and reactive to light. Conjunctivae are normal.   Neck: Neck supple. No tracheal deviation present.   Cardiovascular: Regular rhythm and intact distal pulses.   No extrasystoles are present. Tachycardia present.    Pulmonary/Chest: No stridor. He is intubated. He has decreased breath sounds in the right lower field and the left lower field. He has no wheezes. He has no rhonchi.   Abdominal: Soft. Bowel sounds are normal. He exhibits distension. There is no tenderness. There is no rebound.   Genitourinary:   Genitourinary Comments: Suprapubic catheter   Neurological: He is easily aroused. He appears listless. GCS eye subscore is 3. GCS verbal subscore is 1. GCS motor subscore is 5.   Skin: Skin is warm and dry. No pallor.       Laboratory  Recent Results (from the past 24 hour(s))   ACCU-CHEK GLUCOSE    Collection Time: 04/17/19 " 12:06 AM   Result Value Ref Range    Glucose - Accu-Ck 152 (H) 65 - 99 mg/dL   CBC WITH DIFFERENTIAL    Collection Time: 04/17/19  5:00 AM   Result Value Ref Range    WBC 13.2 (H) 4.8 - 10.8 K/uL    RBC 2.99 (L) 4.70 - 6.10 M/uL    Hemoglobin 9.3 (L) 14.0 - 18.0 g/dL    Hematocrit 29.4 (L) 42.0 - 52.0 %    MCV 98.3 (H) 81.4 - 97.8 fL    MCH 31.1 27.0 - 33.0 pg    MCHC 31.6 (L) 33.7 - 35.3 g/dL    RDW 62.1 (H) 35.9 - 50.0 fL    Platelet Count 305 164 - 446 K/uL    MPV 9.4 9.0 - 12.9 fL    Neutrophils-Polys 79.30 (H) 44.00 - 72.00 %    Lymphocytes 12.30 (L) 22.00 - 41.00 %    Monocytes 5.10 0.00 - 13.40 %    Eosinophils 2.00 0.00 - 6.90 %    Basophils 0.30 0.00 - 1.80 %    Immature Granulocytes 1.00 (H) 0.00 - 0.90 %    Nucleated RBC 0.00 /100 WBC    Neutrophils (Absolute) 10.46 (H) 1.82 - 7.42 K/uL    Lymphs (Absolute) 1.63 1.00 - 4.80 K/uL    Monos (Absolute) 0.67 0.00 - 0.85 K/uL    Eos (Absolute) 0.27 0.00 - 0.51 K/uL    Baso (Absolute) 0.04 0.00 - 0.12 K/uL    Immature Granulocytes (abs) 0.13 (H) 0.00 - 0.11 K/uL    NRBC (Absolute) 0.00 K/uL   Basic Metabolic Panel    Collection Time: 04/17/19  5:00 AM   Result Value Ref Range    Sodium 140 135 - 145 mmol/L    Potassium 4.2 3.6 - 5.5 mmol/L    Chloride 103 96 - 112 mmol/L    Co2 28 20 - 33 mmol/L    Glucose 154 (H) 65 - 99 mg/dL    Bun 71 (HH) 8 - 22 mg/dL    Creatinine 2.45 (H) 0.50 - 1.40 mg/dL    Calcium 8.0 (L) 8.5 - 10.5 mg/dL    Anion Gap 9.0 0.0 - 11.9   VANCOMYCIN TIMED (RANDOM) LEVEL    Collection Time: 04/17/19  5:00 AM   Result Value Ref Range    Vancomycin Unknown Level 39.8 ug/mL   ACCU-CHEK GLUCOSE    Collection Time: 04/17/19  5:00 AM   Result Value Ref Range    Glucose - Accu-Ck 140 (H) 65 - 99 mg/dL   ESTIMATED GFR    Collection Time: 04/17/19  5:00 AM   Result Value Ref Range    GFR If  32 (A) >60 mL/min/1.73 m 2    GFR If Non  27 (A) >60 mL/min/1.73 m 2   VANCOMYCIN TIMED (RANDOM) LEVEL    Collection Time:  04/17/19 12:15 PM   Result Value Ref Range    Vancomycin Unknown Level 36.3 ug/mL   ACCU-CHEK GLUCOSE    Collection Time: 04/17/19 12:15 PM   Result Value Ref Range    Glucose - Accu-Ck 183 (H) 65 - 99 mg/dL   ACCU-CHEK GLUCOSE    Collection Time: 04/17/19  5:45 PM   Result Value Ref Range    Glucose - Accu-Ck 201 (H) 65 - 99 mg/dL       Fluids    Intake/Output Summary (Last 24 hours) at 04/17/19 1924  Last data filed at 04/17/19 1800   Gross per 24 hour   Intake           2090.2 ml   Output              250 ml   Net           1840.2 ml       Core Measures & Quality Metrics  Labs reviewed, Medications reviewed and Radiology images reviewed  Jacob catheter: Urinary Tract Retention or Urinary Tract Obstruction      DVT Prophylaxis: Heparin  DVT prophylaxis - mechanical: SCDs  Ulcer prophylaxis: Yes    Assessed for rehab: Patient unable to tolerate rehabilitation therapeutic regimen    FLORECITA Score  ETOH Screening    Assessment/Plan  Seizures (HCC)   Assessment & Plan    New twitching noted  Sustained seizures on brief EEG  Keppra increased to 1 g every 12h  4/16 continuous EEG started  TCD without vasospasm  Rolan Lopez MD: Neurology     Leukocytosis   Assessment & Plan    4/15 white count up to 16.2: Trend  Blood cultures and sputum cultures sent  cefepime and vancomycin initiated  4/17 white count 13.2: Trend  Antibiotic day 3: De-escalation to Ancef for MSSA in sputum  Trend cultures     Traumatic brain injury (HCC)- (present on admission)   Assessment & Plan    2 adjacent areas of intraparenchymal hemorrhage involving the right frontal lobe with some surrounding vasogenic edema  Bilateral acute subdural hematoma, right greater than left with a large amount of blood in the right middle cranial fossa.  Bilateral acute subarachnoid hemorrhage.  There is right-sided hemispheric edema with 5 mm of right-to-left midline shift.   Anti-seizure prophylaxis, Keppra, x 7 days  4/10 Exam improved to 11T.  Operative therapy and  ICP deferred.  4/15 seizure on EEG: Keppra increased  4/17 continued eye-opening and movement: Trend exam  24-hour EEG without sign of seizures: Continue current Keppra regimen  Trend sodium  Discussed with family: Brain injury in the setting of multiple comorbidities with suboptimal prognosis  They requested we continue care for now  Non-operative management.   Joshua Dsouza MD. Neurosurgery.     Respiratory failure following trauma (HCC)- (present on admission)   Assessment & Plan    Intubated in trauma bay  4/16 ongoing ASV weaning  4/17 ongoing ASV/FiO2 weaning  Tracheostomy on hold until family can meet  Continue SICU weaning protocol   Continue ventilator bundle      End stage renal disease (HCC)   Assessment & Plan    Chronic dialysis patient, [facility]  4/11 - nephrology consult, dialyzed 4/12, 4/14, 4/16 Tuesday Thursday Saturday dialysis  Dr. Birmingham, nephrology     Contraindication to deep vein thrombosis (DVT) prophylaxis- (present on admission)   Assessment & Plan    Systemic anticoagulation contraindicated secondary to elevated bleeding risk.  4/12 Surveillance venous duplex negative  4/13 prophylactic heparin started     Encounter for evaluation of ability to make decisions regarding care   Assessment & Plan    Brother and sister-in-law from [place] did not wish to be decision makers  Awaiting older brother from [place]: Arrival early next week     Closed fracture of skull (HCC)- (present on admission)   Assessment & Plan    Acute mildly displaced left frontal bone skull fracture.  Non-operative management.  Joshua Dsouza MD. Neurosurgery.     History of suprapubic catheter- (present on admission)   Assessment & Plan    Present prior to arrival      Trauma- (present on admission)   Assessment & Plan    [facility], transported to [facility].  Initial non-trauma activation. Upgrade to trauma red after CT imaging demonstrated a traumatic  brain injury and skull fracture  Dawit Schreiber MD. Trauma Surgery.       Ongoing ventilator management.  Optimizing PCO2 and oxygenation with blood gas data.  Pressures adjusted for weaning    Ongoing serial neurologic examinations.  No sign of further seizure activity.    Addressed electrolytes: Patient to be dialyzed tomorrow    CRITICAL CARE DECISION MAKING:    Patiens examined with team at bedside, care discussed with team    Addressed pulmonary hygiene concerns as well as oxygenation/ventilation.   Labs reviewed, electrolytes addressed, renal function assessed  Reviewed nutrition strategies, recent indices  Addressed GI prophylaxis and bowel frequency  Assessed/discussed/titrated analgesics and need for sedatives  Addressed DVT prophylaxis  Addressed line days, mora catheter days, access needs  Addressed family and discharge concerns      Discussed patient condition with Family, RN, RT, Pharmacy, Dietary and .  CRITICAL CARE TIME EXCLUDING PROCEDURES: 36 minutes

## 2019-04-18 NOTE — PROGRESS NOTES
Hd treatment ordered by Dr Hagen started at 1035 and ended at 1338 with net UF of 1000 ml as ordered.  Stable bp entire treatment with no significant change in condition post treatment.

## 2019-04-18 NOTE — PROGRESS NOTES
Dr. Rosa Ramsay called and gave telephone order of 1000 mg of Keppra PO BID for until ordered otherwise.

## 2019-04-18 NOTE — CARE PLAN
Problem: Bowel/Gastric:  Goal: Normal bowel function is maintained or improved  Outcome: PROGRESSING AS EXPECTED  RN to evaluate need for bowel medications 2 BM's in 24hr. RN to assess bowel sounds and abdomen with assessments. RN to check for incontinence with rounding to ensure pt's skin in free from moisture and mora is clean to reduce risk of infection. RN to clean with soap and water to reduce infection risk and apply barrier cream following to reduce risk of skin breakdown.       Problem: Pain Management  Goal: Pain level will decrease to patient's comfort goal  Outcome: PROGRESSING AS EXPECTED  RN to assess pt's pain level through vitals such as HR and blood pressure in addition to restlessness, facial expression, and toleration of ventilation. RN to assess interventions used and use a multimodal approach.

## 2019-04-18 NOTE — CARE PLAN
Problem: Ventilation Defect:  Goal: Ability to achieve and maintain unassisted ventilation or tolerate decreased levels of ventilator support    Intervention: Support and monitor invasive and noninvasive mechanical ventilation  Adult Ventilation Update    Total Vent Days: 9    Patient Lines/Drains/Airways Status    Active Airway     Name: Placement date: Placement time: Site: Days:    Airway ETT 8.0 04/10/19   1409      9              #FVC / Vital Capacity (liters) : 0 (04/18/19 0755)  NIF (cm H2O) : 0 (04/18/19 0755)  Rapid Shallow Breathing Index (RR/VT): 55 (04/18/19 0755)  Plateau Pressure (Q Shift): 17 (04/17/19 1831)  Static Compliance (ml / cm H2O): 99 (04/18/19 1406)      Sputum/Suction   Cough: Productive (04/18/19 1406)  Sputum Amount: Moderate (04/18/19 1406)  Sputum Color: Clear;Yellow (04/18/19 1406)  Sputum Consistency: Thick (04/18/19 1406)      Events/Summary/Plan: back to cmv after 1 hour on spont, did not follow (04/18/19 0755)

## 2019-04-18 NOTE — CARE PLAN
Problem: Skin Integrity  Goal: Risk for impaired skin integrity will decrease  Outcome: PROGRESSING AS EXPECTED  2 RN skin assessment performed, zaria skin scale assessed, waffle cushion and pillow in place, heel float boots on patient, pillows in use for q2 turns and support, all appropriate skin preventative measures in place.     Problem: Safety - Medical Restraint  Goal: Remains free of injury from restraints (Restraint for Interference with Medical Device)  INTERVENTIONS:  1. Determine that other, less restrictive measures have been tried or would not be effective before applying the restraint  2. Evaluate the patient's condition at the time of restraint application  3. Inform patient/family regarding the reason for restraint  4. Q2H: Monitor safety, psychosocial status, comfort, nutrition and hydration   Outcome: PROGRESSING AS EXPECTED  Assessed under restraints q2 hours (restraints removed and range of motion performed), patient NPO, receiving enteral feedings via cortrak, and suprapubic catheter in place. Restraint necessity reviewed due to patient attempting to pull at and to ensure safety of endotracheal tube. Brother of patient aware and understands, patient displays no evidence of learning.

## 2019-04-18 NOTE — CARE PLAN
Problem: Ventilation Defect:  Goal: Ability to achieve and maintain unassisted ventilation or tolerate decreased levels of ventilator support  Outcome: PROGRESSING AS EXPECTED    Intervention: Support and monitor invasive and noninvasive mechanical ventilation  Adult Ventilation Update    Total Vent Days: 9    Patient Lines/Drains/Airways Status    Active Airway     Name: Placement date: Placement time: Site: Days:    Airway ETT 8.0 04/10/19   1409      7                Plateau Pressure (Q Shift): 17 (04/17/19 1831)  Static Compliance (ml / cm H2O): 52 (04/18/19 0300)    PT remains on vent with no changes made overnight

## 2019-04-19 NOTE — PROGRESS NOTES
2 Rn skin check with YURY Qureshi. Skin noted as follows:     Deep tissue injury to posterior of head. Waffle pillow in place.   Tongue assessed. L side of tongue red, excoriated. Will continue to monitor and reposition ETT q2 with RT.   Small skin tear under blood pressure cuff and in R axillary region. 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 Blood pressure cuff removed and repositioned.   Heeling scabs/abrasions on bilateral lower extremities.   Sacrum red and purple with both blanchable and non blanchable areas. Moisture fissure noted. Mepilex reinforced. Pt on waffle mattress, pillows in use for q2 turns.   Unstageable pressure injury to R heel, black, flaky, heel float boots in place.   Finger assessed under oxygen probe. Fingers rotated, probe replaced.  All lines and tubing positioned off of patients skin and all appropriate preventative measures in place.

## 2019-04-19 NOTE — CARE PLAN
Problem: Pain Management  Goal: Pain level will decrease to patient's comfort goal  Outcome: PROGRESSING AS EXPECTED  RN to assess pt's pain level through vitals such as HR and blood pressure in addition to restlessness, facial expression, and toleration of ventilation. RN to assess interventions used and use a multimodal approach.       Problem: Skin Integrity  Goal: Risk for impaired skin integrity will decrease  Outcome: PROGRESSING AS EXPECTED  Skin assessed at beginning of shift and every shift. Nutrition evaluated. Moisturizer assessed . Sensory assessed. Activity assessed. Mobility addressed with patient and family. And friction and shear addressed. Alireza score completed and documented.  Will continue to assess and monitor

## 2019-04-19 NOTE — PROGRESS NOTES
[facility] Daily Progress Note    Date of Service  4/19/2019    Chief Complaint  63-year-old male who has end-stage renal   disease and is on chronic hemodialysis on Tuesdays, Thursdays and Saturdays at   [facility].  The patient dialyzes for 4 hours.  [facility]  He was transferred to [facility] and had a CT of the brain that revealed mildly displaced left   frontal skull fracture and intraparenchymal hemorrhage with edema and   bilateral subdural hematomas.  The patient had worsening respiratory status   after the CT and required intubation and mechanical ventilation.      Interval Problem Update  04/11/19 - Consult done.no HD today.  04/12/19 - No new events.Ventilated. mL.  04/13/19 - Intubated.  In ICU.  No new events.  04/14/19 - Intubated.  In ICU.  No new events.  Dialysis today.  04/15/19 - No new overnight renal events. Right cerebellar hemispheric edema which has significantly improved.  04/16/19 - HD today. No changes overnight. Net UF removed 1L.  04/17/19 - No new events overnight from renal standpoint.   04/18/19 - No changes today. HD today.  04/19/19 - No new overnight renal events. S/p HD yesterday with net UF of 1 L. Phos 1.6.    Review of Systems  Review of Systems   Unable to perform ROS: Intubated     Physical Exam  Pulse:  [] 110  Resp:  [14-21] 15  SpO2:  [98 %-100 %] 98 %    Physical Exam   Constitutional: He appears well-developed and well-nourished.   Ventilated   HENT:   Right Ear: External ear normal.   Left Ear: External ear normal.   Skull Fx  JOANIE  Nasal feeding tube   Eyes: Conjunctivae are normal. Right eye exhibits no discharge. Left eye exhibits no discharge.   Neck: Normal range of motion. Neck supple. No thyromegaly present.   Cardiovascular: Normal rate and regular rhythm.  Exam reveals no friction rub.    Murmur heard.  Pulmonary/Chest: Breath sounds normal. He has no  wheezes. He has no rales.   Ventilated   Abdominal: Soft. Bowel sounds are normal. He exhibits no distension and no mass. There is no tenderness. There is no guarding.   Genitourinary:   Genitourinary Comments: Suprapubic catheter   Musculoskeletal:   Trace edema LE's  LUE AVF  R heel ulcer   Lymphadenopathy:     He has cervical adenopathy.   Neurological:   No interaction   Skin: Skin is warm and dry. No rash noted. No erythema.   Nursing note and vitals reviewed.      Fluids    Intake/Output Summary (Last 24 hours) at 04/19/19 0954  Last data filed at 04/19/19 0600   Gross per 24 hour   Intake             2090 ml   Output             1905 ml   Net              185 ml       Laboratory  Recent Labs      04/17/19   0500  04/18/19   0437  04/19/19   0436   WBC  13.2*  13.2*  13.1*   RBC  2.99*  3.07*  3.29*   HEMOGLOBIN  9.3*  9.5*  10.2*   HEMATOCRIT  29.4*  30.3*  32.9*   MCV  98.3*  98.7*  100.0*   MCH  31.1  30.9  31.0   MCHC  31.6*  31.4*  31.0*   RDW  62.1*  62.7*  63.7*   PLATELETCT  305  356  364   MPV  9.4  9.5  9.5     Recent Labs      04/17/19   0500  04/18/19   0437  04/19/19   0436   SODIUM  140  140  145   POTASSIUM  4.2  4.1  4.9   CHLORIDE  103  105  107   CO2  28  27  31   GLUCOSE  154*  198*  211*   BUN  71*  94*  78*   CREATININE  2.45*  2.85*  2.51*   CALCIUM  8.0*  8.4*  8.5             Recent Labs      04/18/19   0437   TRIGLYCERIDE  60       PMH//SH reviewed    IMPRESSION:  #  End-stage renal disease, on chronic hemodialysis Tuesdays, Thursdays and   Saturdays.  #  Status post traumatic intracerebral bleed with bilateral subdural   hematomas as well.  He has mildly displaced left frontal skull fracture.  #  Ventilator-dependent respiratory failure.  #  Diabetes mellitus.Per primary team.  #  History of hypertension and also history of hypotension with orthostasis,   on midodrine.  #  Anemia of chronic kidney disease, iron repleted.Hb above target  #  Chronic kidney disease -- Metabolic bone  disorder, most recent vitamin D   is normal.  PTH has been suppressed.  He was on calcitriol in the past.  PO4 normal.  #  History of penile necrosis.  #  Status post suprapubic catheter.  #  History of atrial fibrillation, previously on amiodarone.    #  Functional left upper extremity AV fistula.  #  Hypoalbuminemia.  #  Elevated bilirubin.  #  R heel ulcer  #  Leukocytosis. ? Reactive    PLAN:  #  No plans for Dialysis today.      #  No dietary protein restrictions.  #  All of his medicines should be dosed for GFR less than 10.  #  On enteral feedings. No phosphorus restrictions.  #  Recheck serum phos and supplement prn  #  On salt tablets for high intracranial pressure

## 2019-04-19 NOTE — CARE PLAN
Problem: Skin Integrity  Goal: Risk for impaired skin integrity will decrease  Outcome: PROGRESSING AS EXPECTED  2 RN skin assessment complete, zaria skin scale assessed, all appropriate skin interventions in place. See skin note for further detail.     Problem: Mobility  Goal: Risk for activity intolerance will decrease  Outcome: PROGRESSING AS EXPECTED  Pt mobilized with X3 people using sheets as an assist, pt mobilized per shift to increase activity intolerance. X3 bed rails up, bed alarm on, call light within reach, all patient needs met at this time.

## 2019-04-19 NOTE — PROGRESS NOTES
2 RN skin check complete. Areas of note are the following:    - Deep tissue injury to posterior of head  - blanching redness noted underneath maria elena on posterior nek   - Heeling scabs/abrasions on bilateral lower extremities.   - Sacrum red with both blanching and non blanching areas, moisture fissure noted as well. Mepliex changed PRN when soiled or q72 hrs.   - Unstageable pressure injury to R heel, wound consult placed and wound orders in EMR, heels floated in heel float boots and betadine applied daily per active order.       No other areas of note at this time. RN to continue to monitor and implement necessary interventions as needed to maintain skin integrity.

## 2019-04-19 NOTE — PROGRESS NOTES
Trauma / Surgical Daily Progress Note    Date of Service  4/19/2019    Interval Events  Uncertain what the patient's wishes would be moving forward, [facility]  Holding on trach per family, hopefully obtain family conference the week of 4/22  Waxing and waning mental status through out the day  Nephrology documentation reviewed  Decreased NaCl therapy  Prognosis guarded    Review of Systems  Review of Systems     Vital Signs for last 24 hours  Pulse:  [104-168] 107  Resp:  [14-22] 21  SpO2:  [76 %-100 %] 76 %    Hemodynamic parameters for last 24 hours       Respiratory Data     Respiration: (!) 21, Pulse Oximetry: (!) 76 %     Work Of Breathing / Effort: Vented  RUL Breath Sounds: Clear, RML Breath Sounds: Clear, RLL Breath Sounds: Diminished, ADRIEL Breath Sounds: Clear, LLL Breath Sounds: Diminished    Physical Exam  Physical Exam   Constitutional: He appears well-developed and well-nourished. He is sleeping and uncooperative. He is easily aroused. He has a sickly appearance. He is intubated and restrained.   HENT:   Wounds CDI   Eyes: Pupils are equal, round, and reactive to light. Conjunctivae are normal.   Neck: Neck supple. No tracheal deviation present.   Cardiovascular: Regular rhythm and intact distal pulses.   No extrasystoles are present. Tachycardia present.    Pulmonary/Chest: No stridor. He is intubated. He has decreased breath sounds in the right lower field and the left lower field. He has no wheezes. He has no rhonchi.   Abdominal: Soft. Bowel sounds are normal. He exhibits distension. There is no tenderness. There is no rebound.   Genitourinary:   Genitourinary Comments: Suprapubic catheter   Neurological: He is easily aroused. GCS eye subscore is 3. GCS verbal subscore is 1. GCS motor subscore is 5.   Skin: Skin is warm and dry. No pallor.   Nursing note and vitals reviewed.      Laboratory  Recent Results (from the past 24 hour(s))   ACCU-CHEK GLUCOSE    Collection Time: 04/18/19  5:28  PM   Result Value Ref Range    Glucose - Accu-Ck 214 (H) 65 - 99 mg/dL   ACCU-CHEK GLUCOSE    Collection Time: 04/18/19 11:39 PM   Result Value Ref Range    Glucose - Accu-Ck 209 (H) 65 - 99 mg/dL   CBC WITH DIFFERENTIAL    Collection Time: 04/19/19  4:36 AM   Result Value Ref Range    WBC 13.1 (H) 4.8 - 10.8 K/uL    RBC 3.29 (L) 4.70 - 6.10 M/uL    Hemoglobin 10.2 (L) 14.0 - 18.0 g/dL    Hematocrit 32.9 (L) 42.0 - 52.0 %    .0 (H) 81.4 - 97.8 fL    MCH 31.0 27.0 - 33.0 pg    MCHC 31.0 (L) 33.7 - 35.3 g/dL    RDW 63.7 (H) 35.9 - 50.0 fL    Platelet Count 364 164 - 446 K/uL    MPV 9.5 9.0 - 12.9 fL    Neutrophils-Polys 78.10 (H) 44.00 - 72.00 %    Lymphocytes 12.40 (L) 22.00 - 41.00 %    Monocytes 6.70 0.00 - 13.40 %    Eosinophils 1.10 0.00 - 6.90 %    Basophils 0.60 0.00 - 1.80 %    Immature Granulocytes 1.10 (H) 0.00 - 0.90 %    Nucleated RBC 0.00 /100 WBC    Neutrophils (Absolute) 10.21 (H) 1.82 - 7.42 K/uL    Lymphs (Absolute) 1.62 1.00 - 4.80 K/uL    Monos (Absolute) 0.87 (H) 0.00 - 0.85 K/uL    Eos (Absolute) 0.15 0.00 - 0.51 K/uL    Baso (Absolute) 0.08 0.00 - 0.12 K/uL    Immature Granulocytes (abs) 0.14 (H) 0.00 - 0.11 K/uL    NRBC (Absolute) 0.00 K/uL   Basic Metabolic Panel    Collection Time: 04/19/19  4:36 AM   Result Value Ref Range    Sodium 145 135 - 145 mmol/L    Potassium 4.9 3.6 - 5.5 mmol/L    Chloride 107 96 - 112 mmol/L    Co2 31 20 - 33 mmol/L    Glucose 211 (H) 65 - 99 mg/dL    Bun 78 (HH) 8 - 22 mg/dL    Creatinine 2.51 (H) 0.50 - 1.40 mg/dL    Calcium 8.5 8.5 - 10.5 mg/dL    Anion Gap 7.0 0.0 - 11.9   PHOSPHORUS    Collection Time: 04/19/19  4:36 AM   Result Value Ref Range    Phosphorus 1.6 (L) 2.5 - 4.5 mg/dL   ESTIMATED GFR    Collection Time: 04/19/19  4:36 AM   Result Value Ref Range    GFR If  31 (A) >60 mL/min/1.73 m 2    GFR If Non African American 26 (A) >60 mL/min/1.73 m 2   ACCU-CHEK GLUCOSE    Collection Time: 04/19/19  5:17 AM   Result Value Ref Range     Glucose - Accu-Ck 189 (H) 65 - 99 mg/dL   ACCU-CHEK GLUCOSE    Collection Time: 04/19/19 11:55 AM   Result Value Ref Range    Glucose - Accu-Ck 210 (H) 65 - 99 mg/dL       Fluids    Intake/Output Summary (Last 24 hours) at 04/19/19 1544  Last data filed at 04/19/19 1400   Gross per 24 hour   Intake             1730 ml   Output              520 ml   Net             1210 ml       Core Measures & Quality Metrics  Labs reviewed, Medications reviewed and Radiology images reviewed  Jacob catheter: No Jacob      DVT Prophylaxis: Heparin  DVT prophylaxis - mechanical: SCDs  Ulcer prophylaxis: Yes  Antibiotics: Treating active infection/contamination beyond 24 hours perioperative coverage      FLORECITA Score  ETOH Screening    Assessment/Plan  Seizures (HCC)   Assessment & Plan    New twitching noted  Sustained seizures on brief EEG  Keppra increased to 1 g every 12h  4/17 24hr EEG without seizure activity  TCD without vasospasm  Rolan Lopez MD: Neurology     Leukocytosis   Assessment & Plan    4/15 WBC 16.2, Blood cultures and sputum cultures sent, cefepime and vancomycin initiated  4/17 De-escalation to Ancef for MSSA in sputum  Course to complete 4/22     Traumatic brain injury (HCC)- (present on admission)   Assessment & Plan    2 adjacent areas of intraparenchymal hemorrhage involving the right frontal lobe with some surrounding vasogenic edema  Bilateral acute subdural hematoma, right greater than left with a large amount of blood in the right middle cranial fossa.  Bilateral acute subarachnoid hemorrhage.  There is right-sided hemispheric edema with 5 mm of right-to-left midline shift.   4/10 Exam improved to 11T.  Surgery deferred.  4/15 Seizure on spot EEG: Keppra increased  4/17 24-hour EEG without sign of seizures: Continue current Keppra regimen  Discussed with family: Brain injury in the setting of multiple comorbidities with suboptimal prognosis  They requested we continue care for now  Target normal serum  sodium  Non-operative management.   Joshua Dsouza MD. Neurosurgery.     Respiratory failure following trauma (HCC)- (present on admission)   Assessment & Plan    Intubated in trauma bay  Tracheostomy remains on hold until family can meet  Continue SICU weaning protocol   Continue ventilator bundle      End stage renal disease (HCC)- (present on admission)   Assessment & Plan    Chronic dialysis patient with mature extremity fistula  3X/week dialysis  Dr. [place], nephrology     Contraindication to deep vein thrombosis (DVT) prophylaxis- (present on admission)   Assessment & Plan    Systemic anticoagulation contraindicated secondary to elevated bleeding risk.  4/12 Surveillance venous duplex negative  4/13 prophylactic heparin started due to renal impairment     Encounter for evaluation of ability to make decisions regarding care- (present on admission)   Assessment & Plan    Brother and sister-in-law from [place] did not wish to be decision makers  Awaiting older brother from [place] the week of 4/22     Closed fracture of skull (HCC)- (present on admission)   Assessment & Plan    Acute mildly displaced left frontal bone skull fracture.  Non-operative management.  Joshua Dsouza MD. Neurosurgery     History of suprapubic catheter- (present on admission)   Assessment & Plan    Difficult to determine necessity of this  Present prior to arrival      Trauma- (present on admission)   Assessment & Plan    [facility], transported to [facility].  Initial non-trauma activation. Upgrade to trauma red after CT imaging demonstrated a traumatic brain injury and skull fracture  Dawit Schreiber MD. Trauma Surgery.       I independently reviewed pertinent clinical lab tests from the last 48 hours and ordered additional follow up clinical lab tests.  I independently reviewed pertinent radiographic images and the radiologist's reports from the last 48 hours and ordered additional follow up  radiographic studies.  I reviewed the details of the available patient records and documentation by consulting physicians in [facility] up to today, summated the information, and utilized the information as warranted in today's medical decision making for this patient.  I personally evaluated the patient condition at bedside and discussed the daily plan(s) with available nursing staff, dieticians, social workers, pharmacists on rounds.    Persistent respiratory failure requiring mechanical ventilation involving high complexity decision making initiated in an urgent manner by assessing, manipulating, and supporting pulmonary function given the high probability of further deterioration in the patient's condition and threat to life.    I am actively managing this patient's mechanical ventilation and directly involved in the adjustment of multiple settings (FiO2, PEEP, tidal volume, and minute ventilation) based on my personal bedside evaluation of this patient's radiographic, and laboratory findings and clinical changes throughout the day.    Aggregated critical care time spent evaluating, reviewing documentation, providing care, and managing this patient exclusive of procedures: 40 minutes    Yimi Cannon MD

## 2019-04-19 NOTE — CARE PLAN
Problem: Ventilation Defect:  Goal: Ability to achieve and maintain unassisted ventilation or tolerate decreased levels of ventilator support    Intervention: Support and monitor invasive and noninvasive mechanical ventilation  Adult Ventilation Update    Total Vent Days: 10    Patient Lines/Drains/Airways Status    Active Airway     Name: Placement date: Placement time: Site: Days:    Airway ETT 8.0 04/10/19   1409      10              #FVC / Vital Capacity (liters) : 0 (04/19/19 0735)  NIF (cm H2O) : 0 (04/19/19 0735)  Rapid Shallow Breathing Index (RR/VT): 49 (04/19/19 0735)  Plateau Pressure (Q Shift): 16 (04/19/19 0224)  Static Compliance (ml / cm H2O): 61 (04/19/19 1412)      (ASV) % MIN VOL: 110 (04/19/19 1412)  ASV Inspiratory Pressures- 8  % Spontaneous usually 0%    Sputum/Suction   Cough: Non Productive (04/19/19 1200)  Sputum Amount: Small (04/19/19 1200)  Sputum Color: Tan;White (04/19/19 1200)  Sputum Consistency: Thin;Thick (04/19/19 1200)      Events/Summary/Plan: back to asv after 1 hour on spont, did not follow (04/19/19 0735)

## 2019-04-19 NOTE — CARE PLAN
Problem: Ventilation Defect:  Goal: Ability to achieve and maintain unassisted ventilation or tolerate decreased levels of ventilator support    Intervention: Support and monitor invasive and noninvasive mechanical ventilation  Adult Ventilation Update    Total Vent Days: 10    Patient Lines/Drains/Airways Status    Active Airway     Name: Placement date: Placement time: Site: Days:    Airway ETT 8.0 @ 23  04/10/19   1409      10              VD#10  APVCMV  14/450/+8/30%

## 2019-04-19 NOTE — PROGRESS NOTES
2 Rn skin check with YURY Rice. Skin noted as follows:     Deep tissue injury to posterior of head. Waffle pillow in place.   Tongue assessed. L side of tongue red, excoriated. Will continue to monitor and reposition ETT q2 with RT.   Nose assessed around cortrak tubing. Blanchable redness noted, tubing repositioned.   Small skin tear under blood pressure cuff and in R axillary region. 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 Blood pressure cuff removed and repositioned.   Heeling scabs/abrasions on bilateral lower extremities.   Sacrum red and purple with both blanchable and non blanchable areas. Mepilex reinforced. Pt on waffle mattress, pillows in use for q2 turns.   Unstageable pressure injury to R heel, black, flaky, heel float boots in place.   Finger assessed under oxygen probe. Fingers rotated.   All lines and tubing positioned off of patients skin and all appropriate preventative measures in place.

## 2019-04-20 NOTE — CARE PLAN
Problem: Ventilation Defect:  Goal: Ability to achieve and maintain unassisted ventilation or tolerate decreased levels of ventilator support    Intervention: Support and monitor invasive and noninvasive mechanical ventilation  Adult Ventilation Update    Total Vent Days: 11    Patient Lines/Drains/Airways Status    Active Airway     Name: Placement date: Placement time: Site: Days:    Airway ETT 8.0 04/10/19   1409      11            #FVC / Vital Capacity (liters) : 0 (04/20/19 0833)  NIF (cm H2O) : 0 (04/20/19 0833)  Rapid Shallow Breathing Index (RR/VT): 15 (04/20/19 0833)  Plateau Pressure (Q Shift): 18 (04/20/19 0236)  Static Compliance (ml / cm H2O): 73 (04/20/19 1416)      (ASV) % MIN VOL: 110 (04/20/19 1416)  ASV Inspiratory Pressures-8  % Spontaneous -varies    Sputum/Suction   Cough: Productive (04/20/19 1416)  Sputum Amount: Small (04/20/19 1416)  Sputum Color: Yellow (04/20/19 1416)  Sputum Consistency: Thick (04/20/19 1416)    Mobility  Activity Performed: Edge of bed (04/20/19 1200)  Time Activity Tolerated: 5 min (04/20/19 1200)    Events/Summary/Plan: back to asv after 1 hour on spont, did not follow (04/20/19 0859)

## 2019-04-20 NOTE — CARE PLAN
Problem: Ventilation Defect:  Goal: Ability to achieve and maintain unassisted ventilation or tolerate decreased levels of ventilator support    Intervention: Support and monitor invasive and noninvasive mechanical ventilation  VD#11  8.0@23  ASV  110%  +8  30%

## 2019-04-20 NOTE — CARE PLAN
Problem: Safety  Goal: Will remain free from injury  Outcome: PROGRESSING AS EXPECTED  Bed is locked and in low position. Bed alarm is on. Patient is close to nursing station. Call light is within reach and educated on how to use it.     Problem: Skin Integrity  Goal: Risk for impaired skin integrity will decrease  Outcome: PROGRESSING AS EXPECTED  Patient is turned every 2 hours. Waffle cushion in place. Pillows in use to float heels. Pillows under elbows. Mepilex on sacrum.

## 2019-04-20 NOTE — PROGRESS NOTES
2 Rn skin check with YURY Qureshi. Skin noted as follows:     Deep tissue injury to posterior of head. Waffle pillow in place.   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 Blood pressure cuff removed and repositioned.   Heeling scabs/abrasions on bilateral lower extremities.   Sacrum red and purple with both blanchable and non blanchable areas. Moisture fissure noted. Mepilex reinforced. Pt on waffle mattress, pillows in use for q2 turns.   Unstageable pressure injury to R heel, black, flaky, heel float boots in place.   Finger assessed under oxygen probe. Probe rotated between fingers.  All lines and tubing positioned off of patients skin and all appropriate preventative measures in place.

## 2019-04-20 NOTE — PROGRESS NOTES
2 RN skin check complete.   - Deep tissue injury to posterior head, waffle cushion in place  - L side of tongue red  - Scattered abrasion on bilateral legs.   - Moisture fissure noted on sacrum. Mepilex changed. Pt on waffle cushion and pt turned every 2 hours.   - Unstagable pressure injury to right heel, black, flaky, heel float boots on.

## 2019-04-20 NOTE — PROGRESS NOTES
Trauma / Surgical Daily Progress Note    Date of Service  4/20/2019    Interval Events  Uncertain what the patient's wishes would be moving forward, [facility]  Holding on trach per family, hopefully obtain family conference the week of 4/22  Waxing and waning mental status through out the day  Nephrology documentation reviewed  Ceased NaCl PO therapy  Prognosis guarded    Review of Systems  Review of Systems     Vital Signs for last 24 hours  Pulse:  [] 91  Resp:  [12-25] 13  SpO2:  [95 %-100 %] 100 %    Hemodynamic parameters for last 24 hours       Respiratory Data     Respiration: 13, Pulse Oximetry: 100 %     Work Of Breathing / Effort: Vented  RUL Breath Sounds: Clear After Suction, RML Breath Sounds: Diminished, RLL Breath Sounds: Diminished, ADRIEL Breath Sounds: Clear After Suction, LLL Breath Sounds: Diminished    Physical Exam  Physical Exam   Constitutional: He appears well-developed and well-nourished. He is sleeping and uncooperative. He is easily aroused. He has a sickly appearance. He is intubated and restrained.   HENT:   Wounds CDI   Eyes: Pupils are equal, round, and reactive to light. Conjunctivae are normal.   Neck: Neck supple. No tracheal deviation present.   Cardiovascular: Regular rhythm and intact distal pulses.   No extrasystoles are present. Tachycardia present.    Pulmonary/Chest: No stridor. He is intubated. He has decreased breath sounds in the right lower field and the left lower field. He has no wheezes. He has no rhonchi.   Abdominal: Soft. Bowel sounds are normal. He exhibits distension. There is no tenderness. There is no rebound.   Genitourinary:   Genitourinary Comments: Suprapubic catheter   Neurological: He is easily aroused. GCS eye subscore is 3. GCS verbal subscore is 1. GCS motor subscore is 5.   Skin: Skin is warm and dry. No pallor.   Nursing note and vitals reviewed.      Laboratory  Recent Results (from the past 24 hour(s))   ACCU-CHEK GLUCOSE     Collection Time: 04/19/19  5:30 PM   Result Value Ref Range    Glucose - Accu-Ck 194 (H) 65 - 99 mg/dL   ACCU-CHEK GLUCOSE    Collection Time: 04/20/19 12:26 AM   Result Value Ref Range    Glucose - Accu-Ck 176 (H) 65 - 99 mg/dL   CBC WITH DIFFERENTIAL    Collection Time: 04/20/19  3:24 AM   Result Value Ref Range    WBC 12.4 (H) 4.8 - 10.8 K/uL    RBC 3.16 (L) 4.70 - 6.10 M/uL    Hemoglobin 9.6 (L) 14.0 - 18.0 g/dL    Hematocrit 31.4 (L) 42.0 - 52.0 %    MCV 99.4 (H) 81.4 - 97.8 fL    MCH 30.4 27.0 - 33.0 pg    MCHC 30.6 (L) 33.7 - 35.3 g/dL    RDW 62.5 (H) 35.9 - 50.0 fL    Platelet Count 380 164 - 446 K/uL    MPV 9.5 9.0 - 12.9 fL    Neutrophils-Polys 75.90 (H) 44.00 - 72.00 %    Lymphocytes 14.50 (L) 22.00 - 41.00 %    Monocytes 6.20 0.00 - 13.40 %    Eosinophils 1.70 0.00 - 6.90 %    Basophils 0.70 0.00 - 1.80 %    Immature Granulocytes 1.00 (H) 0.00 - 0.90 %    Nucleated RBC 0.00 /100 WBC    Neutrophils (Absolute) 9.38 (H) 1.82 - 7.42 K/uL    Lymphs (Absolute) 1.79 1.00 - 4.80 K/uL    Monos (Absolute) 0.76 0.00 - 0.85 K/uL    Eos (Absolute) 0.21 0.00 - 0.51 K/uL    Baso (Absolute) 0.09 0.00 - 0.12 K/uL    Immature Granulocytes (abs) 0.12 (H) 0.00 - 0.11 K/uL    NRBC (Absolute) 0.00 K/uL   Basic Metabolic Panel    Collection Time: 04/20/19  3:24 AM   Result Value Ref Range    Sodium 145 135 - 145 mmol/L    Potassium 4.6 3.6 - 5.5 mmol/L    Chloride 107 96 - 112 mmol/L    Co2 28 20 - 33 mmol/L    Glucose 194 (H) 65 - 99 mg/dL    Bun 100 (HH) 8 - 22 mg/dL    Creatinine 2.83 (H) 0.50 - 1.40 mg/dL    Calcium 9.3 8.5 - 10.5 mg/dL    Anion Gap 10.0 0.0 - 11.9   ESTIMATED GFR    Collection Time: 04/20/19  3:24 AM   Result Value Ref Range    GFR If  27 (A) >60 mL/min/1.73 m 2    GFR If Non African American 23 (A) >60 mL/min/1.73 m 2   ACCU-CHEK GLUCOSE    Collection Time: 04/20/19  5:13 AM   Result Value Ref Range    Glucose - Accu-Ck 190 (H) 65 - 99 mg/dL   PHOSPHORUS    Collection Time: 04/20/19 10:55  AM   Result Value Ref Range    Phosphorus 1.7 (L) 2.5 - 4.5 mg/dL   MAGNESIUM    Collection Time: 04/20/19 10:55 AM   Result Value Ref Range    Magnesium 2.0 1.5 - 2.5 mg/dL   ACCU-CHEK GLUCOSE    Collection Time: 04/20/19 12:55 PM   Result Value Ref Range    Glucose - Accu-Ck 196 (H) 65 - 99 mg/dL       Fluids    Intake/Output Summary (Last 24 hours) at 04/20/19 1438  Last data filed at 04/20/19 1400   Gross per 24 hour   Intake           2080.2 ml   Output             2095 ml   Net            -14.8 ml       Core Measures & Quality Metrics  Labs reviewed, Medications reviewed and Radiology images reviewed  Jacob catheter: No Jacob      DVT Prophylaxis: Heparin  DVT prophylaxis - mechanical: SCDs  Ulcer prophylaxis: Yes  Antibiotics: Treating active infection/contamination beyond 24 hours perioperative coverage      FLORECITA Score  ETOH Screening    Assessment/Plan  Seizures (HCC)   Assessment & Plan    New twitching noted  Sustained seizures on brief EEG  Keppra increased to 1 g every 12h  4/17 24hr EEG without seizure activity  TCD without vasospasm  Rolan Lopez MD: Neurology     Leukocytosis   Assessment & Plan    4/15 WBC 16.2, Blood cultures and sputum cultures sent, cefepime and vancomycin initiated  4/17 De-escalation to Ancef for MSSA in sputum  Course to complete 4/22     Traumatic brain injury (HCC)- (present on admission)   Assessment & Plan    2 adjacent areas of intraparenchymal hemorrhage involving the right frontal lobe with some surrounding vasogenic edema  Bilateral acute subdural hematoma, right greater than left with a large amount of blood in the right middle cranial fossa.  Bilateral acute subarachnoid hemorrhage.  There is right-sided hemispheric edema with 5 mm of right-to-left midline shift.   4/10 Exam improved to 11T.  Surgery deferred.  4/15 Seizure on spot EEG: Keppra increased  4/17 24-hour EEG without sign of seizures: Continue current Keppra regimen  Discussed with family: Brain injury in  the setting of multiple comorbidities with suboptimal prognosis  They requested we continue care for now  Target normal serum sodium  Non-operative management.   Joshua Dsouza MD. Neurosurgery.     Respiratory failure following trauma (HCC)- (present on admission)   Assessment & Plan    Intubated in trauma bay  Tracheostomy remains on hold until family can meet  Continue SICU weaning protocol   Continue ventilator bundle      End stage renal disease (HCC)- (present on admission)   Assessment & Plan    Chronic dialysis patient with mature extremity fistula  3X/week dialysis  Dr. [place], nephrology     Contraindication to deep vein thrombosis (DVT) prophylaxis- (present on admission)   Assessment & Plan    Systemic anticoagulation contraindicated secondary to elevated bleeding risk.  4/12 Surveillance venous duplex negative  4/13 prophylactic heparin started due to renal impairment     Encounter for evaluation of ability to make decisions regarding care- (present on admission)   Assessment & Plan    Brother and sister-in-law from [place] did not wish to be decision makers  Awaiting older brother from [place] the week of 4/22     Closed fracture of skull (HCC)- (present on admission)   Assessment & Plan    Acute mildly displaced left frontal bone skull fracture.  Non-operative management.  Joshua Dsouza MD. Neurosurgery     History of suprapubic catheter- (present on admission)   Assessment & Plan    Difficult to determine necessity of this  Present prior to arrival      Trauma- (present on admission)   Assessment & Plan    [facility], transported to [facility].  Initial non-trauma activation. Upgrade to trauma red after CT imaging demonstrated a traumatic brain injury and skull fracture  Dawit Schreiber MD. Trauma Surgery.       I independently reviewed pertinent clinical lab tests from the last 48 hours and ordered additional follow up clinical lab tests.  I independently  reviewed pertinent radiographic images and the radiologist's reports from the last 48 hours and ordered additional follow up radiographic studies.  I reviewed the details of the available patient records and documentation by consulting physicians in [facility] up to today, summated the information, and utilized the information as warranted in today's medical decision making for this patient.  I personally evaluated the patient condition at bedside and discussed the daily plan(s) with available nursing staff, dieticians, social workers, pharmacists on rounds.    Persistent respiratory failure requiring mechanical ventilation involving high complexity decision making initiated in an urgent manner by assessing, manipulating, and supporting pulmonary function given the high probability of further deterioration in the patient's condition and threat to life.    I am actively managing this patient's mechanical ventilation and directly involved in the adjustment of multiple settings (FiO2, PEEP, tidal volume, and minute ventilation) based on my personal bedside evaluation of this patient's radiographic, and laboratory findings and clinical changes throughout the day.    Aggregated critical care time spent evaluating, reviewing documentation, providing care, and managing this patient exclusive of procedures: 40 minutes    Yimi Cannon MD

## 2019-04-20 NOTE — PROGRESS NOTES
[facility] Nephrology Daily Progress Note    Date of Service  4/20/2019    Chief Complaint  63-year-old male who has end-stage renal   disease and is on chronic hemodialysis on Tuesdays, Thursdays and Saturdays at   [facility].  The patient dialyzes for 4 hours.  [facility]  He was transferred to [facility] and had a CT of the brain that revealed mildly displaced left   frontal skull fracture and intraparenchymal hemorrhage with edema and   bilateral subdural hematomas.  The patient had worsening respiratory status   after the CT and required intubation and mechanical ventilation.      Interval Problem Update  04/11/19 - Consult done.no HD today.  04/12/19 - No new events.Ventilated. mL.  04/13/19 - Intubated.  In ICU.  No new events.  04/14/19 - Intubated.  In ICU.  No new events.  Dialysis today.  04/15/19 - No new overnight renal events. Right cerebellar hemispheric edema which has significantly improved.  04/16/19 - HD today. No changes overnight. Net UF removed 1L.  04/17/19 - No new events overnight from renal standpoint.   04/18/19 - No changes today. HD today.  04/19/19 - No new overnight renal events. S/p HD yesterday with net UF of 1 L. Phos 1.6.  04/20/19 - tolerated HD today, phos supplement provided yesterday    Review of Systems  Review of Systems   Unable to perform ROS: Intubated     Physical Exam  Pulse:  [] 98  Resp:  [12-25] 16  SpO2:  [76 %-100 %] 100 %    Physical Exam   Constitutional: He appears well-developed and well-nourished.   Ventilated   HENT:   Right Ear: External ear normal.   Left Ear: External ear normal.   Skull Fx  ETT  Nasal feeding tube   Eyes: Conjunctivae are normal. Right eye exhibits no discharge. Left eye exhibits no discharge.   Neck: Normal range of motion. Neck supple. No thyromegaly present.   Cardiovascular: Normal rate and regular rhythm.  Exam reveals no friction rub.     Murmur heard.  Pulmonary/Chest: Breath sounds normal. He has no wheezes. He has no rales.   Ventilated   Abdominal: Soft. Bowel sounds are normal. He exhibits no distension and no mass. There is no tenderness. There is no guarding.   Genitourinary:   Genitourinary Comments: Suprapubic catheter   Musculoskeletal:   Trace edema LE's  LUE AVF  R heel ulcer   Neurological:   No interaction   Skin: Skin is warm and dry. No rash noted. No erythema.   Nursing note and vitals reviewed.      Fluids    Intake/Output Summary (Last 24 hours) at 04/20/19 0912  Last data filed at 04/20/19 0800   Gross per 24 hour   Intake           2080.2 ml   Output              625 ml   Net           1455.2 ml       Laboratory  Recent Labs      04/18/19 0437 04/19/19 0436  04/20/19   0324   WBC  13.2*  13.1*  12.4*   RBC  3.07*  3.29*  3.16*   HEMOGLOBIN  9.5*  10.2*  9.6*   HEMATOCRIT  30.3*  32.9*  31.4*   MCV  98.7*  100.0*  99.4*   MCH  30.9  31.0  30.4   MCHC  31.4*  31.0*  30.6*   RDW  62.7*  63.7*  62.5*   PLATELETCT  356  364  380   MPV  9.5  9.5  9.5     Recent Labs      04/18/19   0437  04/19/19   0436  04/20/19   0324   SODIUM  140  145  145   POTASSIUM  4.1  4.9  4.6   CHLORIDE  105  107  107   CO2  27  31  28   GLUCOSE  198*  211*  194*   BUN  94*  78*  100*   CREATININE  2.85*  2.51*  2.83*   CALCIUM  8.4*  8.5  9.3             Recent Labs      04/18/19   0437   TRIGLYCERIDE  60       PMH/FH/SH reviewed    IMPRESSION:  #  End-stage renal disease, on chronic hemodialysis Tuesdays, Thursdays and   Saturdays.  #  Status post traumatic intracerebral bleed with bilateral subdural   hematomas as well.  He has mildly displaced left frontal skull fracture.  #  Ventilator-dependent respiratory failure. Vent per pulm/cc  #  Diabetes mellitus.Per primary team.  #  History of hypertension and also history of hypotension with orthostasis,   on midodrine.  #  Anemia of chronic kidney disease, iron repleted.Hb above target  #  Chronic  kidney disease -- Metabolic bone disorder, most recent vitamin D   is normal.  PTH has been suppressed.  He was on calcitriol in the past.    #  History of penile necrosis.  #  Status post suprapubic catheter.  #  History of atrial fibrillation, previously on amiodarone.    #  Functional left upper extremity AV fistula.  #  Hypoalbuminemia.  #  Elevated bilirubin.  #  R heel ulcer  #  Leukocytosis. abx per primary service  # Hypophos      PLAN:  #  HD TTS      #  No dietary protein restrictions.  #  All of his medicines should be dosed for GFR less than 10.  #  On enteral feedings. No phosphorus restrictions.  #  Recheck serum phos and supplement prn  # recheck magnesium given use of mag  #  On salt tablets for high intracranial pressure

## 2019-04-20 NOTE — CARE PLAN
Problem: Safety  Goal: Will remain free from injury  Outcome: PROGRESSING AS EXPECTED  Pt provided assistance with mobility, X3 RN assist, assisted to edge of bed with sheets/waffle mattress, non skid socks in place, X3 bedrails up, bed alarm on, call light within reach. Pt reoriented to hospital stay and event, no evidence of learning. All safety interventions in place.     Problem: Skin Integrity  Goal: Risk for impaired skin integrity will decrease  Outcome: PROGRESSING AS EXPECTED  2 RN skin check complete, Alireza skin scale assessed, all appropriate skin interventions in place. See skin note for further detail.

## 2019-04-21 NOTE — PROGRESS NOTES
2 RN skin check complete.    Deep tissue injury to posterior head. Waffle cushion in place.  Generalized scabs and abrasions to bilateral lower extremities.  Sacrum pressure injury red/purple and not blanching. Mepilex over sacrum.  Pressure injury to right heel, black and flaky. Heel float boots on patient.   Fistula to left upper extremity.

## 2019-04-21 NOTE — PROGRESS NOTES
Bedside report received from YURY Qureshi. Care assumed.    Family care meeting held with Dr. Cannon at 1400.    Claus Chowdhury RN, BSN, [facility], CCRN

## 2019-04-21 NOTE — CARE PLAN
Problem: Ventilation Defect:  Goal: Ability to achieve and maintain unassisted ventilation or tolerate decreased levels of ventilator support    Intervention: Support and monitor invasive and noninvasive mechanical ventilation  Adult Ventilation Update    Total Vent Days: 12    Patient Lines/Drains/Airways Status    Active Airway     Name: Placement date: Placement time: Site: Days:    Airway ETT 8.0 04/10/19   1409      12              #FVC / Vital Capacity (liters) : 0 (04/21/19 0934)  NIF (cm H2O) : 0 (04/21/19 0934)  Rapid Shallow Breathing Index (RR/VT): 15 (04/20/19 0833)  Plateau Pressure (Q Shift): 18 (04/21/19 0248)  Static Compliance (ml / cm H2O): 59 (04/21/19 1355)    Barriers to SBT Weaning Trial Stopped due to:: Apnea > 30 seconds X 4 (04/21/19 0934)    (ASV) % MIN VOL: 110 (04/21/19 1355)  ASV Inspiratory Pressures-10  % Spontaneous varies    Sputum/Suction   Cough: Productive (04/21/19 1355)  Sputum Amount: Small (04/21/19 1355)  Sputum Color: Yellow (04/21/19 1355)  Sputum Consistency: Thick (04/21/19 1355)    Mobility  Activity Performed: Edge of bed (04/21/19 1200)  Time Activity Tolerated: 5 min (04/21/19 1200)    Events/Summary/Plan: back to asv, apnea and increased wob (04/21/19 0934)

## 2019-04-21 NOTE — CARE PLAN
Problem: Ventilation Defect:  Goal: Ability to achieve and maintain unassisted ventilation or tolerate decreased levels of ventilator support    Intervention: Support and monitor invasive and noninvasive mechanical ventilation  Adult Ventilation Update    Total Vent Days: 12    Patient Lines/Drains/Airways Status    Active Airway     Name: Placement date: Placement time: Site: Days:    Airway ETT 8.0 04/10/19   1409      12              VD#11  8.0 @ 23  ASV  110  +8  30%

## 2019-04-21 NOTE — PROGRESS NOTES
Left voice mail for Brother Ezequiel that Dr Cannon is back on the unit and would like meet before 1500 today. Waiting on callback to confirm.

## 2019-04-21 NOTE — PROGRESS NOTES
[facility] Nephrology Daily Progress Note    Date of Service  4/21/2019    Chief Complaint  63-year-old male who has end-stage renal   disease and is on chronic hemodialysis on Tuesdays, Thursdays and Saturdays at   [facility].  The patient dialyzes for 4 hours.  [facility]  He was transferred to [facility] and had a CT of the brain that revealed mildly displaced left   frontal skull fracture and intraparenchymal hemorrhage with edema and   bilateral subdural hematomas.  The patient had worsening respiratory status   after the CT and required intubation and mechanical ventilation.      Interval Problem Update  04/11/19 - Consult done.no HD today.  04/12/19 - No new events.Ventilated. mL.  04/13/19 - Intubated.  In ICU.  No new events.  04/14/19 - Intubated.  In ICU.  No new events.  Dialysis today.  04/15/19 - No new overnight renal events. Right cerebellar hemispheric edema which has significantly improved.  04/16/19 - HD today. No changes overnight. Net UF removed 1L.  04/17/19 - No new events overnight from renal standpoint.   04/18/19 - No changes today. HD today.  04/19/19 - No new overnight renal events. S/p HD yesterday with net UF of 1 L. Phos 1.6.  04/20/19 - tolerated HD today, phos supplement provided yesterday  04/21/19 - phos supplemented yesterday after HD, tolerated HD    Review of Systems  Review of Systems   Unable to perform ROS: Intubated     Physical Exam  Pulse:  [] 110  Resp:  [10-22] 17  SpO2:  [90 %-100 %] 100 %    Physical Exam   Constitutional: He appears well-developed and well-nourished.   Ventilated   HENT:   Right Ear: External ear normal.   Left Ear: External ear normal.   Skull Fx  ETT  Nasal feeding tube   Eyes: Conjunctivae are normal. Right eye exhibits no discharge. Left eye exhibits no discharge.   Neck: Normal range of motion. Neck supple. No thyromegaly present.   Cardiovascular:  Normal rate and regular rhythm.  Exam reveals no friction rub.    Murmur heard.  MELISSA AVF +thrill, +bruit   Pulmonary/Chest: Breath sounds normal. He has no wheezes. He has no rales.   Ventilated   Abdominal: Soft. Bowel sounds are normal. He exhibits no distension and no mass. There is no tenderness. There is no guarding.   Genitourinary:   Genitourinary Comments: Suprapubic catheter   Musculoskeletal:       R heel ulcer   Neurological:   No interaction   Skin: Skin is warm and dry. No rash noted. No erythema.   Nursing note and vitals reviewed.      Fluids    Intake/Output Summary (Last 24 hours) at 04/21/19 0948  Last data filed at 04/21/19 0800   Gross per 24 hour   Intake           2269.8 ml   Output             1865 ml   Net            404.8 ml       Laboratory  Recent Labs      04/19/19   0436  04/20/19   0324  04/21/19   0545   WBC  13.1*  12.4*  11.8*   RBC  3.29*  3.16*  2.78*   HEMOGLOBIN  10.2*  9.6*  8.8*   HEMATOCRIT  32.9*  31.4*  26.9*   MCV  100.0*  99.4*  96.8   MCH  31.0  30.4  31.7   MCHC  31.0*  30.6*  32.7*   RDW  63.7*  62.5*  60.1*   PLATELETCT  364  380  338   MPV  9.5  9.5  9.7     Recent Labs      04/19/19   0436  04/20/19   0324  04/21/19   0545   SODIUM  145  145  143   POTASSIUM  4.9  4.6  4.2   CHLORIDE  107  107  105   CO2  31  28  29   GLUCOSE  211*  194*  197*   BUN  78*  100*  85*   CREATININE  2.51*  2.83*  2.52*   CALCIUM  8.5  9.3  8.6                 Imaging   reviewed      IMPRESSION:  #  End-stage renal disease, on chronic hemodialysis Tuesdays, Thursdays and   Saturdays.  #  Status post traumatic intracerebral bleed with bilateral subdural   hematomas as well.  He has mildly displaced left frontal skull fracture.  #  Ventilator-dependent respiratory failure. Vent per pulm/cc  #  Diabetes mellitus.Per primary team.  #  History of hypertension and also history of hypotension with orthostasis,   on midodrine.  #  Anemia of chronic kidney disease, iron repleted.Hb above target  #   Chronic kidney disease -- Metabolic bone disorder, most recent vitamin D   is normal.  PTH has been suppressed.  He was on calcitriol in the past.    #  History of penile necrosis.  #  Status post suprapubic catheter.  #  History of atrial fibrillation, previously on amiodarone.    #  Functional left upper extremity AV fistula.  #  Hypoalbuminemia.  #  Elevated bilirubin.  #  R heel ulcer  #  Leukocytosis. abx per primary service  # Hypophos      PLAN:  #  HD TTS      #  No dietary protein restrictions.  #  All of his medicines should be dosed for GFR less than 10.  #  On enteral feedings. No phosphorus restrictions.  #  Recheck serum phos in AM and supplement prn  #  On salt tablets for high intracranial pressure

## 2019-04-21 NOTE — CARE PLAN
Problem: Skin Integrity  Goal: Risk for impaired skin integrity will decrease  Outcome: PROGRESSING AS EXPECTED  2 RN Skin check to be completed. Turning patient q2 hours using pillows, assessing for moisture and changing linens prn.

## 2019-04-22 NOTE — CARE PLAN
Problem: Ventilation Defect:  Goal: Ability to achieve and maintain unassisted ventilation or tolerate decreased levels of ventilator support    Intervention: Support and monitor invasive and noninvasive mechanical ventilation  Adult Ventilation Update    Total Vent Days: 13    Patient Lines/Drains/Airways Status    Active Airway     Name: Placement date: Placement time: Site: Days:    Airway ETT 8.0 @23 04/10/19   1409      13              ASV  110%  +8  30%

## 2019-04-22 NOTE — PROGRESS NOTES
Trauma / Surgical Daily Progress Note    Date of Service  4/21/2019    Interval Events  Waxing and waning mental status through out the day  Nephrology documentation reviewed  Prognosis guarded  Family conference at bedside today, provided updates and thoughts on outcome.  Further input from family forthcoming.    Review of Systems  Review of Systems     Vital Signs for last 24 hours  Pulse:  [] 106  Resp:  [10-21] 13  SpO2:  [90 %-100 %] 100 %    Hemodynamic parameters for last 24 hours       Respiratory Data     Respiration: 13, Pulse Oximetry: 100 %     Work Of Breathing / Effort: Vented  RUL Breath Sounds: Clear After Suction, RML Breath Sounds: Diminished, RLL Breath Sounds: Diminished, ADRIEL Breath Sounds: Clear After Suction, LLL Breath Sounds: Diminished    Physical Exam  Physical Exam   Constitutional: He appears well-developed and well-nourished. He is sleeping and uncooperative. He is easily aroused. He has a sickly appearance. He is intubated and restrained.   HENT:   Wounds CDI   Eyes: Pupils are equal, round, and reactive to light. Conjunctivae are normal.   Neck: Neck supple. No tracheal deviation present.   Cardiovascular: Regular rhythm and intact distal pulses.   No extrasystoles are present. Tachycardia present.    Pulmonary/Chest: No stridor. He is intubated. He has decreased breath sounds in the right lower field and the left lower field. He has no wheezes. He has no rhonchi.   Abdominal: Soft. Bowel sounds are normal. He exhibits distension. There is no tenderness. There is no rebound.   Genitourinary:   Genitourinary Comments: Suprapubic catheter   Neurological: He is easily aroused. GCS eye subscore is 3. GCS verbal subscore is 1. GCS motor subscore is 5.   Skin: Skin is warm and dry. No pallor.   Nursing note and vitals reviewed.      Laboratory  Recent Results (from the past 24 hour(s))   ACCU-CHEK GLUCOSE    Collection Time: 04/20/19  5:18 PM   Result Value Ref Range    Glucose -  Accu-Ck 207 (H) 65 - 99 mg/dL   ACCU-CHEK GLUCOSE    Collection Time: 04/20/19 11:50 PM   Result Value Ref Range    Glucose - Accu-Ck 168 (H) 65 - 99 mg/dL   CBC WITH DIFFERENTIAL    Collection Time: 04/21/19  5:45 AM   Result Value Ref Range    WBC 11.8 (H) 4.8 - 10.8 K/uL    RBC 2.78 (L) 4.70 - 6.10 M/uL    Hemoglobin 8.8 (L) 14.0 - 18.0 g/dL    Hematocrit 26.9 (L) 42.0 - 52.0 %    MCV 96.8 81.4 - 97.8 fL    MCH 31.7 27.0 - 33.0 pg    MCHC 32.7 (L) 33.7 - 35.3 g/dL    RDW 60.1 (H) 35.9 - 50.0 fL    Platelet Count 338 164 - 446 K/uL    MPV 9.7 9.0 - 12.9 fL    Neutrophils-Polys 75.20 (H) 44.00 - 72.00 %    Lymphocytes 17.10 (L) 22.00 - 41.00 %    Monocytes 4.80 0.00 - 13.40 %    Eosinophils 1.80 0.00 - 6.90 %    Basophils 0.30 0.00 - 1.80 %    Immature Granulocytes 0.80 0.00 - 0.90 %    Nucleated RBC 0.00 /100 WBC    Neutrophils (Absolute) 8.85 (H) 1.82 - 7.42 K/uL    Lymphs (Absolute) 2.01 1.00 - 4.80 K/uL    Monos (Absolute) 0.57 0.00 - 0.85 K/uL    Eos (Absolute) 0.21 0.00 - 0.51 K/uL    Baso (Absolute) 0.04 0.00 - 0.12 K/uL    Immature Granulocytes (abs) 0.10 0.00 - 0.11 K/uL    NRBC (Absolute) 0.00 K/uL   Basic Metabolic Panel    Collection Time: 04/21/19  5:45 AM   Result Value Ref Range    Sodium 143 135 - 145 mmol/L    Potassium 4.2 3.6 - 5.5 mmol/L    Chloride 105 96 - 112 mmol/L    Co2 29 20 - 33 mmol/L    Glucose 197 (H) 65 - 99 mg/dL    Bun 85 (HH) 8 - 22 mg/dL    Creatinine 2.52 (H) 0.50 - 1.40 mg/dL    Calcium 8.6 8.5 - 10.5 mg/dL    Anion Gap 9.0 0.0 - 11.9   ESTIMATED GFR    Collection Time: 04/21/19  5:45 AM   Result Value Ref Range    GFR If  31 (A) >60 mL/min/1.73 m 2    GFR If Non African American 26 (A) >60 mL/min/1.73 m 2   ACCU-CHEK GLUCOSE    Collection Time: 04/21/19  5:48 AM   Result Value Ref Range    Glucose - Accu-Ck 177 (H) 65 - 99 mg/dL   ACCU-CHEK GLUCOSE    Collection Time: 04/21/19 12:18 PM   Result Value Ref Range    Glucose - Accu-Ck 179 (H) 65 - 99 mg/dL        Fluids    Intake/Output Summary (Last 24 hours) at 04/21/19 1716  Last data filed at 04/21/19 1200   Gross per 24 hour   Intake           1989.8 ml   Output              370 ml   Net           1619.8 ml       Core Measures & Quality Metrics  Labs reviewed, Medications reviewed and Radiology images reviewed  Jacob catheter: No Jacob      DVT Prophylaxis: Heparin  DVT prophylaxis - mechanical: SCDs  Ulcer prophylaxis: Yes  Antibiotics: Treating active infection/contamination beyond 24 hours perioperative coverage      FLORECITA Score  ETOH Screening    Assessment/Plan  Seizures (HCC)   Assessment & Plan    New twitching noted  Sustained seizures on brief EEG  Keppra increased to 1 g every 12h  4/17 24hr EEG without seizure activity  TCD without vasospasm  Rolan Lopez MD: Neurology     Leukocytosis   Assessment & Plan    4/15 WBC 16.2, Blood cultures and sputum cultures sent, cefepime and vancomycin initiated  4/17 De-escalation to Ancef for MSSA in sputum  Course to complete 4/22     Traumatic brain injury (HCC)- (present on admission)   Assessment & Plan    2 adjacent areas of intraparenchymal hemorrhage involving the right frontal lobe with some surrounding vasogenic edema  Bilateral acute subdural hematoma, right greater than left with a large amount of blood in the right middle cranial fossa.  Bilateral acute subarachnoid hemorrhage.  There is right-sided hemispheric edema with 5 mm of right-to-left midline shift.   4/10 Exam improved to 11T.  Surgery deferred.  4/15 Seizure on spot EEG: Keppra increased  4/17 24-hour EEG without sign of seizures: Continue current Keppra regimen  Discussed with family: Brain injury in the setting of multiple comorbidities with suboptimal prognosis  They requested we continue care for now  Target normal serum sodium  Non-operative management.   Joshua Dsouza MD. Neurosurgery.     Respiratory failure following trauma (HCC)- (present on admission)   Assessment & Plan    Intubated in  trauma bay  Tracheostomy remains on hold until family can meet  Continue SICU weaning protocol   Continue ventilator bundle      End stage renal disease (HCC)- (present on admission)   Assessment & Plan    Chronic dialysis patient with mature extremity fistula  3X/week dialysis  Dr. Birmingham, nephrology     Contraindication to deep vein thrombosis (DVT) prophylaxis- (present on admission)   Assessment & Plan    Systemic anticoagulation contraindicated secondary to elevated bleeding risk.  4/12 Surveillance venous duplex negative  4/13 prophylactic heparin started due to renal impairment     Encounter for evaluation of ability to make decisions regarding care- (present on admission)   Assessment & Plan    Brother and sister-in-law from [place] did not wish to be decision makers  Awaiting older brother from [place] the week of 4/22     Closed fracture of skull (HCC)- (present on admission)   Assessment & Plan    Acute mildly displaced left frontal bone skull fracture.  Non-operative management.  Joshua Dsouza MD. Neurosurgery     History of suprapubic catheter- (present on admission)   Assessment & Plan    Difficult to determine necessity of this  Present prior to arrival      Trauma- (present on admission)   Assessment & Plan    [facility], [facility].  Initial non-trauma activation. Upgrade to trauma red after CT imaging demonstrated a traumatic brain injury and skull fracture  Dawit Schreiber MD. Trauma Surgery.       I independently reviewed pertinent clinical lab tests from the last 48 hours and ordered additional follow up clinical lab tests.  I independently reviewed pertinent radiographic images and the radiologist's reports from the last 48 hours and ordered additional follow up radiographic studies.  I reviewed the details of the available patient records and documentation by consulting physicians in EPIC up to today, summated the information, and utilized  the information as warranted in today's medical decision making for this patient.  I personally evaluated the patient condition at bedside and discussed the daily plan(s) with available nursing staff, dieticians, social workers, pharmacists on rounds.    Persistent respiratory failure requiring mechanical ventilation involving high complexity decision making initiated in an urgent manner by assessing, manipulating, and supporting pulmonary function given the high probability of further deterioration in the patient's condition and threat to life.    I am actively managing this patient's mechanical ventilation and directly involved in the adjustment of multiple settings (FiO2, PEEP, tidal volume, and minute ventilation) based on my personal bedside evaluation of this patient's radiographic, and laboratory findings and clinical changes throughout the day.    Aggregated critical care time spent evaluating, reviewing documentation, providing care, and managing this patient exclusive of procedures: 40 minutes    Yimi Cannon MD

## 2019-04-22 NOTE — PROGRESS NOTES
Late entry:    1355: Patient transitioned to comfort care.  Patient extubated.  Family at bedside.  Comfort provided.

## 2019-04-22 NOTE — PROGRESS NOTES
Family care conference occurred today with MD Cannon. Brother is considering comfort care but will decide tomorrow (4/22/19).  services required by the family for AM    Suction canisters changed; TF set up and bag changed. Float boots on bilateral lower extremities. FSBG = 139 and did not require insulin coverage. VSS. Abdomen distended, round and increasingly firm.    Claus Chowdhury RN, BSN, [facility], CCRN

## 2019-04-22 NOTE — PROGRESS NOTES
Trauma / Surgical Daily Progress Note    Date of Service  4/22/2019    Chief Complaint  63 y.o. male admitted 4/10/2019 with Trauma    Interval Events    No significant neurologic improvement.  Family awaking devan before go to comfort care  No questions or other issues    Review of Systems  Review of Systems   Unable to perform ROS: Intubated        Vital Signs for last 24 hours  Pulse:  [102-114] 102  Resp:  [12-26] 12  SpO2:  [96 %-100 %] 100 %    Hemodynamic parameters for last 24 hours       Respiratory Data     Respiration: 12, Pulse Oximetry: 100 %     Work Of Breathing / Effort: Vented  RUL Breath Sounds: Clear, RML Breath Sounds: Clear, RLL Breath Sounds: Diminished, ADRIEL Breath Sounds: Clear, LLL Breath Sounds: Diminished    Physical Exam  Physical Exam   Constitutional: He appears well-developed and well-nourished. He is sleeping and uncooperative. He is easily aroused. He has a sickly appearance. He is intubated and restrained.   HENT:   Wounds CDI   Eyes: Pupils are equal, round, and reactive to light. Conjunctivae are normal.   Neck: Neck supple. No tracheal deviation present.   Cardiovascular: Regular rhythm and intact distal pulses.   No extrasystoles are present. Tachycardia present.    Pulmonary/Chest: No stridor. He is intubated. He has decreased breath sounds in the right lower field and the left lower field. He has no wheezes. He has no rhonchi.   Abdominal: Soft. Bowel sounds are normal. He exhibits distension. There is no tenderness. There is no rebound.   Genitourinary:   Genitourinary Comments: Suprapubic catheter   Neurological: He is easily aroused. GCS eye subscore is 3. GCS verbal subscore is 1. GCS motor subscore is 5.   Skin: Skin is warm and dry. No pallor.   Nursing note and vitals reviewed.      Laboratory  Recent Results (from the past 24 hour(s))   ACCU-CHEK GLUCOSE    Collection Time: 04/21/19  6:20 PM   Result Value Ref Range    Glucose - Accu-Ck 129 (H) 65 - 99 mg/dL    ACCU-CHEK GLUCOSE    Collection Time: 04/22/19 12:30 AM   Result Value Ref Range    Glucose - Accu-Ck 188 (H) 65 - 99 mg/dL   CBC WITH DIFFERENTIAL    Collection Time: 04/22/19  5:40 AM   Result Value Ref Range    WBC 12.3 (H) 4.8 - 10.8 K/uL    RBC 2.96 (L) 4.70 - 6.10 M/uL    Hemoglobin 9.3 (L) 14.0 - 18.0 g/dL    Hematocrit 29.1 (L) 42.0 - 52.0 %    MCV 98.3 (H) 81.4 - 97.8 fL    MCH 31.4 27.0 - 33.0 pg    MCHC 32.0 (L) 33.7 - 35.3 g/dL    RDW 61.8 (H) 35.9 - 50.0 fL    Platelet Count 354 164 - 446 K/uL    MPV 9.8 9.0 - 12.9 fL    Neutrophils-Polys 78.40 (H) 44.00 - 72.00 %    Lymphocytes 14.20 (L) 22.00 - 41.00 %    Monocytes 4.10 0.00 - 13.40 %    Eosinophils 1.70 0.00 - 6.90 %    Basophils 0.60 0.00 - 1.80 %    Immature Granulocytes 1.00 (H) 0.00 - 0.90 %    Nucleated RBC 0.00 /100 WBC    Neutrophils (Absolute) 9.67 (H) 1.82 - 7.42 K/uL    Lymphs (Absolute) 1.75 1.00 - 4.80 K/uL    Monos (Absolute) 0.50 0.00 - 0.85 K/uL    Eos (Absolute) 0.21 0.00 - 0.51 K/uL    Baso (Absolute) 0.07 0.00 - 0.12 K/uL    Immature Granulocytes (abs) 0.12 (H) 0.00 - 0.11 K/uL    NRBC (Absolute) 0.00 K/uL   Basic Metabolic Panel    Collection Time: 04/22/19  5:40 AM   Result Value Ref Range    Sodium 140 135 - 145 mmol/L    Potassium 4.7 3.6 - 5.5 mmol/L    Chloride 103 96 - 112 mmol/L    Co2 28 20 - 33 mmol/L    Glucose 189 (H) 65 - 99 mg/dL    Bun 112 (HH) 8 - 22 mg/dL    Creatinine 3.17 (H) 0.50 - 1.40 mg/dL    Calcium 9.1 8.5 - 10.5 mg/dL    Anion Gap 9.0 0.0 - 11.9   MAGNESIUM    Collection Time: 04/22/19  5:40 AM   Result Value Ref Range    Magnesium 2.2 1.5 - 2.5 mg/dL   PHOSPHORUS    Collection Time: 04/22/19  5:40 AM   Result Value Ref Range    Phosphorus 3.4 2.5 - 4.5 mg/dL   ACCU-CHEK GLUCOSE    Collection Time: 04/22/19  5:40 AM   Result Value Ref Range    Glucose - Accu-Ck 171 (H) 65 - 99 mg/dL   ESTIMATED GFR    Collection Time: 04/22/19  5:40 AM   Result Value Ref Range    GFR If  24 (A) >60  mL/min/1.73 m 2    GFR If Non African American 20 (A) >60 mL/min/1.73 m 2       Fluids    Intake/Output Summary (Last 24 hours) at 04/22/19 1247  Last data filed at 04/22/19 0600   Gross per 24 hour   Intake             1370 ml   Output              325 ml   Net             1045 ml       Core Measures & Quality Metrics  Labs reviewed, Medications reviewed and Radiology images reviewed  Jacob catheter: No Jacob      DVT Prophylaxis: Heparin  DVT prophylaxis - mechanical: SCDs  Ulcer prophylaxis: Yes  Antibiotics: Treating active infection/contamination beyond 24 hours perioperative coverage      FLORECITA Score  ETOH Screening    Assessment/Plan  Seizures (HCC)   Assessment & Plan    New twitching noted  Sustained seizures on brief EEG  Keppra increased to 1 g every 12h  4/17 24hr EEG without seizure activity  TCD without vasospasm  Rolan Lopez MD: Neurology     Leukocytosis   Assessment & Plan    4/15 WBC 16.2, Blood cultures and sputum cultures sent, cefepime and vancomycin initiated  4/17 De-escalation to Ancef for MSSA in sputum  Course to complete 4/22     Traumatic brain injury (HCC)- (present on admission)   Assessment & Plan    2 adjacent areas of intraparenchymal hemorrhage involving the right frontal lobe with some surrounding vasogenic edema  Bilateral acute subdural hematoma, right greater than left with a large amount of blood in the right middle cranial fossa.  Bilateral acute subarachnoid hemorrhage.  There is right-sided hemispheric edema with 5 mm of right-to-left midline shift.   4/10 Exam improved to 11T.  Surgery deferred.  4/15 Seizure on spot EEG: Keppra increased  4/17 24-hour EEG without sign of seizures: Continue current Keppra regimen  Discussed with family: Brain injury in the setting of multiple comorbidities with suboptimal prognosis  They requested we continue care for now  Non-operative management.   Joshua Dsouza MD. Neurosurgery.     Respiratory failure following trauma (HCC)- (present on  admission)   Assessment & Plan    Intubated in trauma bay  Tracheostomy remains on hold until family can meet  Continue SICU weaning protocol   Continue ventilator bundle      End stage renal disease (HCC)- (present on admission)   Assessment & Plan    Chronic dialysis patient with mature extremity fistula  3X/week dialysis  Dr. Birmingham, nephrology     Contraindication to deep vein thrombosis (DVT) prophylaxis- (present on admission)   Assessment & Plan    Systemic anticoagulation contraindicated secondary to elevated bleeding risk.  4/12 Surveillance venous duplex negative  4/13 prophylactic heparin started due to renal impairment     Encounter for evaluation of ability to make decisions regarding care- (present on admission)   Assessment & Plan    Brother and sister-in-law from [place] did not wish to be decision makers  Awaiting older brother from [place] the week of 4/22     Closed fracture of skull (HCC)- (present on admission)   Assessment & Plan    Acute mildly displaced left frontal bone skull fracture.  Non-operative management.  Joshua Dsouza MD. Neurosurgery     History of suprapubic catheter- (present on admission)   Assessment & Plan    Difficult to determine necessity of this  Present prior to arrival      Trauma- (present on admission)   Assessment & Plan    [facility], [facility].  Initial non-trauma activation. Upgrade to trauma red after CT imaging demonstrated a traumatic brain injury and skull fracture  Dawit Schreiber MD. Trauma Surgery.         Discussed patient condition with Family, RN, RT, Pharmacy and trauma surgery.  CRITICAL CARE TIME EXCLUDING PROCEDURES: 40 minutes

## 2019-04-22 NOTE — PROGRESS NOTES
Spiritual Care Note    Patient Information     Patient's Name: Lito Coronado   MRN: [id number]    YOB: 1955   Age and Gender: 63 y.o. male   Unit: [facility]   Room (and Bed): S108   Ethnicity or Nationality:    Primary Language: English   Gnosticist/Spiritual Preference: no Church on file   Place of Residence: [place], [place]   Family Present: 2 brothers   Medical Diagnoses: seizures  leukocytosis  traumatic brain injury  respiratory failure following trauma  end stage renal disease   closed fracture of skull    Code Status: Full Code    Date of Admission: 4/10/2019   Length of Stay: 12 days      Spiritual Screen Results  Is your spiritual health or inner well-being important to you as you cope with your medical condition?     Unable to determine  Would you like to receive a visit from our Spiritual Care team or your own [employer]ondo[employer] or spiritual leader?     Unable to determine  Was spiritual care education provided to the patient?     Declined     Request Information  Nature of the Visit:     initial, on shift  Continue Visiting:      upon request  Crisis Visit:      trauma, critical care, patient actively dying/EOL  Referral from/Origin of Request:   verbal staff, phone  Gnosticist Needs:     prayer  Ritual Needs:      anointing, EOL ritual    Encounter Information  Visited with:      family (patient's 2 brothers)  Observations/Symptoms:    Family anxious.  Patient intubated, unresponsive to voice.  Interacton/Conversation:    Brothers requested prayer and anointing before extubatiion and comfort care.  Assessment:      distress  Distress:      grief/loss/bereavement  Interventions:     compassionate presence, emotional support, prayer, anointing  Outcomes:      emotional release, progress with grief, spiritual momfort  Total Time:      10 minutes    Spiritual Care Provider Information  Title of Spiritual Care Provider:    Name of Spiritual Care Provider:   DIVYA Chun  Phone  Number:     [phone]   E-Mail:       [email or web address]

## 2019-04-22 NOTE — PROGRESS NOTES
[facility] Nephrology Daily Progress Note    Date of Service  4/22/2019    Chief Complaint  63-year-old male who has end-stage renal   disease and is on chronic hemodialysis on Tuesdays, Thursdays and Saturdays at   [facility].  The patient dialyzes for 4 hours.  [facility].  He was transferred to [facility] and had a CT of the brain that revealed mildly displaced left   frontal skull fracture and intraparenchymal hemorrhage with edema and   bilateral subdural hematomas.  The patient had worsening respiratory status   after the CT and required intubation and mechanical ventilation.      Interval Problem Update  04/11/19 - Consult done.no HD today.  04/12/19 - No new events.Ventilated. mL.  04/13/19 - Intubated.  In ICU.  No new events.  04/14/19 - Intubated.  In ICU.  No new events.  Dialysis today.  04/15/19 - No new overnight renal events. Right cerebellar hemispheric edema which has significantly improved.  04/16/19 - HD today. No changes overnight. Net UF removed 1L.  04/17/19 - No new events overnight from renal standpoint.   04/18/19 - No changes today. HD today.  04/19/19 - No new overnight renal events. S/p HD yesterday with net UF of 1 L. Phos 1.6.  04/20/19 - tolerated HD today, phos supplement provided yesterday  04/21/19 - phos supplemented yesterday after HD, tolerated HD  04/22/19 - Phos improved from 1.7 to 3.4.  No significant neurological improvement. Family making patient comfort care after talking with .    Review of Systems  Review of Systems   Unable to perform ROS: Intubated     Physical Exam  Pulse:  [102-114] 102  Resp:  [12-26] 12  SpO2:  [96 %-100 %] 100 %    Physical Exam   Constitutional: He appears well-developed and well-nourished. No distress.   Intubated   HENT:   Right Ear: External ear normal.   Left Ear: External ear normal.   Nose: Nose normal.   Mouth/Throat: Oropharynx is clear and  moist.   Healing abrasions  ETT  Nasal feeding tube   Eyes: Pupils are equal, round, and reactive to light. Conjunctivae are normal. Right eye exhibits no discharge. Left eye exhibits no discharge. No scleral icterus.   Neck: Normal range of motion. Neck supple. JVD present. No tracheal deviation present.   Cardiovascular: Normal rate and regular rhythm.  Exam reveals no friction rub.    Murmur heard.  Left upper extremity AV fistula with thrill and bruit   Pulmonary/Chest: Breath sounds normal. No stridor. No respiratory distress. He has no wheezes. He has no rales.   Abdominal: Soft. Bowel sounds are normal. He exhibits no distension and no mass. There is no tenderness. There is no guarding.   Genitourinary:   Genitourinary Comments: Suprapubic catheter   Musculoskeletal:       R heel ulcer   Neurological:   Opens eyes spontaneously.  No commands.   Skin: Skin is warm and dry. No rash noted. He is not diaphoretic. No erythema.   Nursing note and vitals reviewed.      Fluids    Intake/Output Summary (Last 24 hours) at 04/22/19 1341  Last data filed at 04/22/19 1200   Gross per 24 hour   Intake             1760 ml   Output              345 ml   Net             1415 ml       Laboratory  Recent Labs      04/20/19   0324  04/21/19   0545  04/22/19   0540   WBC  12.4*  11.8*  12.3*   RBC  3.16*  2.78*  2.96*   HEMOGLOBIN  9.6*  8.8*  9.3*   HEMATOCRIT  31.4*  26.9*  29.1*   MCV  99.4*  96.8  98.3*   MCH  30.4  31.7  31.4   MCHC  30.6*  32.7*  32.0*   RDW  62.5*  60.1*  61.8*   PLATELETCT  380  338  354   MPV  9.5  9.7  9.8     Recent Labs      04/20/19   0324  04/21/19   0545  04/22/19   0540   SODIUM  145  143  140   POTASSIUM  4.6  4.2  4.7   CHLORIDE  107  105  103   CO2  28  29  28   GLUCOSE  194*  197*  189*   BUN  100*  85*  112*   CREATININE  2.83*  2.52*  3.17*   CALCIUM  9.3  8.6  9.1                 Imaging   Reviewed.      IMPRESSION:  #  End-stage renal disease, on chronic hemodialysis Tuesdays, Thursdays  and   Saturdays.  #  Status post traumatic intracerebral bleed with bilateral subdural   hematomas as well.  He has mildly displaced left frontal skull fracture.  #  Ventilator-dependent respiratory failure. Vent per pulm/cc  #  Diabetes mellitus.Per primary team.  #  History of hypertension and also history of hypotension with orthostasis,   on midodrine.  #  Anemia of chronic kidney disease, iron repleted.  Hb above target  #  Chronic kidney disease -- Metabolic bone disorder, most recent vitamin D   is normal.  PTH has been suppressed.  He was on calcitriol in the past.    #  History of penile necrosis.  #  Status post suprapubic catheter.  #  History of atrial fibrillation, previously on amiodarone.    #  Functional left upper extremity AV fistula.  #  Hypoalbuminemia.  #  Elevated bilirubin.  #  R heel ulcer  #  Leukocytosis. abx per primary service  #  Hypophosphatemia-resolved following replacement.    PLAN:  -Comfort care measures as per family request.  -No indication for dialysis while on comfort care measures.    Patient seen and discussed with Dr. oSria.      Will sign off.  Please call if questions.

## 2019-04-22 NOTE — CARE PLAN
Problem: Skin Integrity  Goal: Risk for impaired skin integrity will decrease  Outcome: PROGRESSING SLOWER THAN EXPECTED  Skin and wounds assessed. Heel float boots in place. Waffle overlay on bed. Mepilex on upper back and sacrum. Patient turned q2 hours. Lines/tubes repositioned q2 hours with turns.     Problem: Respiratory:  Goal: Respiratory status will improve  Collaborate with RT. Oral care and suctioning provided q2 hours. Pulse oximetry monitored. Ambu bag at bedside.

## 2019-04-23 NOTE — DISCHARGE SUMMARY
DATE OF ADMISSION:  04/10/2019    DATE OF DISCHARGE:  04/22/2019    DISPOSITION:  Death, discharged to [facility].    ADMITTING DIAGNOSES:  1.  Traumatic brain injury, intraparenchymal hemorrhage, possible subdural   hematomas.  2.  Skull fracture.  3.  Respiratory failure following trauma.  4.  Ground level fall.  5.  Renal failure with dialysis.  6.  History of suprapubic catheter.    DISCHARGE DIAGNOSES:  1.  Traumatic brain injury.  2.  Respiratory failure following trauma.  3.  Seizures, focal right frontal.  4.  Leukocytosis.  5.  End-stage renal disease with renal failure and dialysis.  6.  Skull fracture.  7.  History of suprapubic catheter.    CONSULTS:  1.  Neurosurgical consult was obtained with Dr. Dsouza on admission on   04/10/2019.  2.  Nephrology consult obtained with Dr. Birmingham on 04/11/2019.   3.  Neurology consult was obtained with Dr. Amezcua on 04/15/2019 for possible   seizure activity.    PROCEDURE PERFORMED:  On 04/15/2019, EEG was done for 22 minutes, showed   possible focal seizure activity right frontal.  EEG done was 04/16/2019 and   EEG on 04/17/2019, no further seizure activity noted.    HOSPITAL COURSE:  [facility]   [facility], unknown as to situation, syncope versus a ground level fall.  Patient   was brought in as a trauma.  On evaluation, he had significant findings   including skull fractures, multiple intraparenchymal as well as subdural   hematomas, all findings consistent with trauma and was activated as trauma at   that point, was admitted by Dr. Schreiber, transferred directly to the ICU.  He   was seen by Dr. Dsouza given his extensive medical history and the   underlying disease and this extensive bleed, was a poor candidate for any sort   of intervention.  He underwent supportive care; on subsequent days, he had   some focal seizure activity as noted on EEG, treated with increased Keppra, he   was seen by Dr. Amezcua from  neurology.  He underwent dialysis 3 times a   week.  He did develop fevers, sputum cultures were sent and started on appropriate   antibiotics.  The course of antibiotics was completed, was placed on   supportive care.  He had no significant improvement.  We waited until after   [place] when family was available.  They stated he would no longer want to live   like this as he was not functional and they chose to make him comfort care.    He was seen by .  He was placed on comfort care, was extubated and was   pronounced at 1656 hours.  's office was notified as he was a trauma.    Family was at the bedside.  All questions were answered.       ____________________________________     MD PARESH CASTELLON / DYLAN    DD:  04/22/2019 18:41:22  DT:  04/22/2019 19:21:03    D#:  [id number]  Job#:  [id number]

## 2019-04-23 NOTE — PROGRESS NOTES
Late entry:    : Patient .  Family at bedside.  Comfort provided.  Dr. Ortiz made aware.  2 RN pronounce.

## 2019-04-23 NOTE — DOCUMENTATION QUERY
"                                                                         [facility]                                                                       Query Response Note      PATIENT:               ILDEFONSO SYED  ACCT #:                  [id number]  MRN:                     [id number]  :                      1955  ADMIT DATE:       4/10/2019 9:41 AM  DISCH DATE:        2019 4:56 PM  RESPONDING  PROVIDER #:        [id number]           QUERY TEXT:    Altered mental status is documented in the Medical Record.  Can a more specific diagnosis be provided?    NOTE:  If the appropriate response is not listed below, please respond with a new note.    The patient's Clinical Indicators include:  \"63 year old male admitted to the ED with confusion.  CT was performed and showed significant intraparenchymal hemorrhage, edema and bilateral subdural hematoma\" is documented in the H&P. CT-Head on 4/10/19 also noted a subdural hemorrhage, bilateral subarachnoid hemorrhage, a displaced L-frontal bone skull fracture, and a 5mm right-to-left midline shift d/t R-hemispheric edema.    Treatments include: Neurosurgery consult, Neurology consult, CT-Head, Intubation w/Mechanical Ventilation, Narcan, Keppra, Vancomycin, and Cefepime,.    Risk factors include: dx Traumatic SDH/IPH/SAH, dx Cerebral Edema, dx Respiratory Failure, dx Seizures, and hx HTN + ESRD.    Query created by: Clint Yusuf on 2019 10:47 AM    RESPONSE TEXT:    This patient suffered a traumatic brain injury as outlined in the the problem list.          Electronically signed by:  CAMMIE CORDOVA MD 2019 12:25 PM              "

## 2019-11-15 NOTE — CARE PLAN
Problem: Safety  Goal: Will remain free from falls  Outcome: PROGRESSING AS EXPECTED  Patient currently restrained for safety of lines.  Bed alarm in use and bed in lowest position.  Patient unable to use call light.    Problem: Skin Integrity  Goal: Risk for impaired skin integrity will decrease  Outcome: PROGRESSING AS EXPECTED  Patient turned every 2 hours.  Waffle overlay on bed and waffle pillow in use behind head.  Patient has documented wound and pressure ulcers.  Skin fragile.  Mepilex, barrier creams and heel float boots in use.       no

## 2020-05-08 NOTE — PROGRESS NOTES
12 hour chart check.   Spinal Block      Patient location during procedure: OR  Indication:procedure for pain  Performed By  Anesthesiologist: Baldo Landeros MD  Preanesthetic Checklist  Completed: patient identified, site marked, surgical consent, pre-op evaluation, timeout performed, IV checked, risks and benefits discussed and monitors and equipment checked  Spinal Block Prep:  Patient Position:sitting  Sterile Tech:cap, gloves and mask  Prep:Chloraprep  Patient Monitoring:blood pressure monitoring, continuous pulse oximetry and EKG  Spinal Block Procedure  Approach:right paramedian  Guidance:landmark technique  Location:L3-L4  Needle Type:Quincke  Needle Gauge:25 G  Placement of Spinal needle event:cerebrospinal fluid aspirated  Paresthesia: no  Fluid Appearance:clear  Medications: bupivacaine PF (MARCAINE) 0.75 % injection, 2 mL  Med Administered at 5/8/2020 8:19 AM   Post Assessment  Patient Tolerance:patient tolerated the procedure well with no apparent complications  Complications no

## 2021-10-09 NOTE — PROGRESS NOTES
"Pharmacy Kinetics      63 y.o. male on vancomycin day # 2  2019     Vancomycin Pulse Dose 1800 mg iv x 1 @ 1300 on 4/15     Indication for Treatment: PNA     Pertinent history per medical record: Admitted on 4/10/2019 for bilateral SDH and intraparenchymal hemorrhage w/ edema d/t presumed fall 2/2 syncope/AMS. Pt has a hx of ESRD on HD TTS with a fistula placed on the LUE. Pt has had persistent leukocytosis and low-grade fever since admission and CXR show infiltrates, therefore broad spectrum abx were initiated for concern of PNA.     Other antibiotics: Cefepime 1 g q24h (renally dosed)     Allergies: Patient has no allergy information on record.      List concerns for renal function:  - ESRD on HD     Pertinent cultures to date:   4/10/19 - BCx x2 No growth  4/15/19 - BCx x2 NGTD  4/15/19 - Tracheal aspirate in process     MRSA nares swab if pneumonia is a concern (ordered/positive/negative/n-a): Negative           Recent Labs      19   0335  19   0520  04/15/19   0555   WBC  12.0*  15.2*  16.2*   NEUTSPOLYS  82.70*  85.30*  84.60*            Recent Labs      19   0335  19   0520  04/15/19   0555   BUN  30*  53*  49*   CREATININE  2.76*  3.15*  2.52*   ALBUMIN  2.5*  2.8*  3.0*      Intake/Output Summary (Last 24 hours) at 04/15/19 1409  Last data filed at 04/15/19 1200    Gross per 24 hour   Intake          1503.63 ml   Output              575 ml   Net           928.63 ml      /81   Pulse (!) 106   Temp 36.3 °C (97.4 °F) (Temporal)   Resp 16   Ht 1.803 m (5' 11\")   Wt 70.8 kg (156 lb 1.4 oz)   SpO2 98%  Temp (24hrs), Av.8 °C (98.2 °F), Min:36.3 °C (97.4 °F), Max:37.2 °C (99 °F)     A/P        1. Vancomycin dose change: Pulse dosing 1400 mg x 1  2. Next vancomycin level: Random 14 hr and 21 hr levels tomorrow @ 0500 and 1200 (ordered)  3. Goal trough: 16-20 mcg/mL  4. Comments: Random 23-hr level today was 15.4 mcg/mL after receiving 1800 mg x 1 yesterday demonstrating " some clearance. Pt was dialyzed today, therefore will dose 20 mg/kg (1400 mg) x 1 today and check random levels tomorrow to assess clearance.     Marilin Mccann, PharmD     18